# Patient Record
Sex: FEMALE | Race: WHITE | NOT HISPANIC OR LATINO | Employment: OTHER | RURAL
[De-identification: names, ages, dates, MRNs, and addresses within clinical notes are randomized per-mention and may not be internally consistent; named-entity substitution may affect disease eponyms.]

---

## 2020-12-03 ENCOUNTER — HISTORICAL (OUTPATIENT)
Dept: ADMINISTRATIVE | Facility: HOSPITAL | Age: 50
End: 2020-12-03

## 2020-12-03 LAB
CHOLEST SERPL-MCNC: 143 MG/DL (ref 140–200)
CHOLEST/HDLC SERPL: 3 {RATIO}
HDLC SERPL-MCNC: 55 MG/DL
LDLC SERPL CALC-MCNC: 55 MG/DL
TRIGL SERPL-MCNC: 167 MG/DL (ref 35–150)

## 2021-06-02 ENCOUNTER — OFFICE VISIT (OUTPATIENT)
Dept: FAMILY MEDICINE | Facility: CLINIC | Age: 51
End: 2021-06-02
Payer: COMMERCIAL

## 2021-06-02 VITALS — SYSTOLIC BLOOD PRESSURE: 100 MMHG | DIASTOLIC BLOOD PRESSURE: 68 MMHG | TEMPERATURE: 98 F | HEART RATE: 75 BPM

## 2021-06-02 DIAGNOSIS — G47.30 SLEEP APNEA, UNSPECIFIED TYPE: ICD-10-CM

## 2021-06-02 DIAGNOSIS — Z12.31 ENCOUNTER FOR SCREENING MAMMOGRAM FOR BREAST CANCER: ICD-10-CM

## 2021-06-02 DIAGNOSIS — I10 ESSENTIAL HYPERTENSION: Primary | ICD-10-CM

## 2021-06-02 LAB
ALBUMIN SERPL BCP-MCNC: 3.7 G/DL (ref 3.5–5)
ALBUMIN/GLOB SERPL: 1.1 {RATIO}
ALP SERPL-CCNC: 94 U/L (ref 41–108)
ALT SERPL W P-5'-P-CCNC: 25 U/L (ref 13–56)
ANION GAP SERPL CALCULATED.3IONS-SCNC: 12 MMOL/L (ref 7–16)
AST SERPL W P-5'-P-CCNC: 17 U/L (ref 15–37)
BASOPHILS # BLD AUTO: 0.06 K/UL (ref 0–0.2)
BASOPHILS NFR BLD AUTO: 0.8 % (ref 0–1)
BILIRUB SERPL-MCNC: 0.3 MG/DL (ref 0–1.2)
BUN SERPL-MCNC: 17 MG/DL (ref 7–18)
BUN/CREAT SERPL: 9 (ref 6–20)
CALCIUM SERPL-MCNC: 9.2 MG/DL (ref 8.5–10.1)
CHLORIDE SERPL-SCNC: 109 MMOL/L (ref 98–107)
CHOLEST SERPL-MCNC: 184 MG/DL (ref 0–200)
CHOLEST/HDLC SERPL: 3.1 {RATIO}
CO2 SERPL-SCNC: 25 MMOL/L (ref 21–32)
CREAT SERPL-MCNC: 1.97 MG/DL (ref 0.55–1.02)
DIFFERENTIAL METHOD BLD: ABNORMAL
EOSINOPHIL # BLD AUTO: 0.13 K/UL (ref 0–0.5)
EOSINOPHIL NFR BLD AUTO: 1.7 % (ref 1–4)
ERYTHROCYTE [DISTWIDTH] IN BLOOD BY AUTOMATED COUNT: 14.2 % (ref 11.5–14.5)
GLOBULIN SER-MCNC: 3.5 G/DL (ref 2–4)
GLUCOSE SERPL-MCNC: 100 MG/DL (ref 74–106)
HCT VFR BLD AUTO: 44 % (ref 38–47)
HDLC SERPL-MCNC: 60 MG/DL (ref 40–60)
HGB BLD-MCNC: 14.2 G/DL (ref 12–16)
IMM GRANULOCYTES # BLD AUTO: 0.02 K/UL (ref 0–0.04)
IMM GRANULOCYTES NFR BLD: 0.3 % (ref 0–0.4)
LDLC SERPL CALC-MCNC: 105 MG/DL
LDLC/HDLC SERPL: 1.8 {RATIO}
LYMPHOCYTES # BLD AUTO: 2.3 K/UL (ref 1–4.8)
LYMPHOCYTES NFR BLD AUTO: 30.5 % (ref 27–41)
MCH RBC QN AUTO: 31.1 PG (ref 27–31)
MCHC RBC AUTO-ENTMCNC: 32.3 G/DL (ref 32–36)
MCV RBC AUTO: 96.5 FL (ref 80–96)
MONOCYTES # BLD AUTO: 0.42 K/UL (ref 0–0.8)
MONOCYTES NFR BLD AUTO: 5.6 % (ref 2–6)
MPC BLD CALC-MCNC: 11.2 FL (ref 9.4–12.4)
NEUTROPHILS # BLD AUTO: 4.61 K/UL (ref 1.8–7.7)
NEUTROPHILS NFR BLD AUTO: 61.1 % (ref 53–65)
NONHDLC SERPL-MCNC: 124 MG/DL
NRBC # BLD AUTO: 0 X10E3/UL
NRBC, AUTO (.00): 0 %
PLATELET # BLD AUTO: 338 K/UL (ref 150–400)
POTASSIUM SERPL-SCNC: 5.1 MMOL/L (ref 3.5–5.1)
PROT SERPL-MCNC: 7.2 G/DL (ref 6.4–8.2)
RBC # BLD AUTO: 4.56 M/UL (ref 4.2–5.4)
SODIUM SERPL-SCNC: 141 MMOL/L (ref 136–145)
TRIGL SERPL-MCNC: 97 MG/DL (ref 35–150)
VLDLC SERPL-MCNC: 19 MG/DL
WBC # BLD AUTO: 7.54 K/UL (ref 4.5–11)

## 2021-06-02 PROCEDURE — 99214 OFFICE O/P EST MOD 30 MIN: CPT | Mod: ,,, | Performed by: NURSE PRACTITIONER

## 2021-06-02 PROCEDURE — 1126F PR PAIN SEVERITY QUANTIFIED, NO PAIN PRESENT: ICD-10-PCS | Mod: ,,, | Performed by: NURSE PRACTITIONER

## 2021-06-02 PROCEDURE — 1126F AMNT PAIN NOTED NONE PRSNT: CPT | Mod: ,,, | Performed by: NURSE PRACTITIONER

## 2021-06-02 PROCEDURE — 85025 COMPLETE CBC W/AUTO DIFF WBC: CPT | Mod: ,,, | Performed by: CLINICAL MEDICAL LABORATORY

## 2021-06-02 PROCEDURE — 99214 PR OFFICE/OUTPT VISIT, EST, LEVL IV, 30-39 MIN: ICD-10-PCS | Mod: ,,, | Performed by: NURSE PRACTITIONER

## 2021-06-02 PROCEDURE — 80053 COMPREHEN METABOLIC PANEL: CPT | Mod: ,,, | Performed by: CLINICAL MEDICAL LABORATORY

## 2021-06-02 PROCEDURE — 80061 LIPID PANEL: CPT | Mod: ,,, | Performed by: CLINICAL MEDICAL LABORATORY

## 2021-06-02 PROCEDURE — 85025 CBC WITH DIFFERENTIAL: ICD-10-PCS | Mod: ,,, | Performed by: CLINICAL MEDICAL LABORATORY

## 2021-06-02 PROCEDURE — 80061 LIPID PANEL: ICD-10-PCS | Mod: ,,, | Performed by: CLINICAL MEDICAL LABORATORY

## 2021-06-02 PROCEDURE — 80053 COMPREHENSIVE METABOLIC PANEL: ICD-10-PCS | Mod: ,,, | Performed by: CLINICAL MEDICAL LABORATORY

## 2021-06-02 RX ORDER — CETIRIZINE HYDROCHLORIDE 10 MG/1
10 TABLET ORAL DAILY
COMMUNITY
End: 2021-09-08 | Stop reason: SDUPTHER

## 2021-06-02 RX ORDER — VIT C/E/ZN/COPPR/LUTEIN/ZEAXAN 250MG-90MG
1000 CAPSULE ORAL DAILY
COMMUNITY
End: 2021-11-04

## 2021-06-02 RX ORDER — ASPIRIN 81 MG/1
81 TABLET ORAL DAILY
COMMUNITY

## 2021-06-02 RX ORDER — BUDESONIDE AND FORMOTEROL FUMARATE DIHYDRATE 160; 4.5 UG/1; UG/1
2 AEROSOL RESPIRATORY (INHALATION) EVERY 12 HOURS
COMMUNITY
End: 2021-09-08 | Stop reason: SDUPTHER

## 2021-06-02 RX ORDER — AMLODIPINE BESYLATE 10 MG/1
10 TABLET ORAL DAILY
COMMUNITY
End: 2021-09-08 | Stop reason: SDUPTHER

## 2021-06-02 RX ORDER — EZETIMIBE 10 MG/1
10 TABLET ORAL DAILY
COMMUNITY
End: 2021-09-08 | Stop reason: SDUPTHER

## 2021-06-02 RX ORDER — EVOLOCUMAB 140 MG/ML
140 INJECTION, SOLUTION SUBCUTANEOUS
COMMUNITY
End: 2021-12-08 | Stop reason: ALTCHOICE

## 2021-06-02 RX ORDER — CYCLOBENZAPRINE HCL 10 MG
10 TABLET ORAL 3 TIMES DAILY PRN
COMMUNITY
End: 2021-09-08 | Stop reason: SDUPTHER

## 2021-06-02 RX ORDER — LOSARTAN POTASSIUM 50 MG/1
50 TABLET ORAL DAILY
COMMUNITY
End: 2021-09-08 | Stop reason: SDUPTHER

## 2021-06-02 RX ORDER — LABETALOL 200 MG/1
200 TABLET, FILM COATED ORAL 2 TIMES DAILY
COMMUNITY
End: 2021-07-08 | Stop reason: SDUPTHER

## 2021-06-02 RX ORDER — GABAPENTIN 600 MG/1
600 TABLET ORAL 3 TIMES DAILY
COMMUNITY
End: 2021-09-08 | Stop reason: SDUPTHER

## 2021-06-02 RX ORDER — CLOPIDOGREL BISULFATE 75 MG/1
75 TABLET ORAL DAILY
COMMUNITY
End: 2021-09-08 | Stop reason: SDUPTHER

## 2021-06-02 RX ORDER — PANTOPRAZOLE SODIUM 40 MG/1
40 TABLET, DELAYED RELEASE ORAL 2 TIMES DAILY
COMMUNITY
End: 2021-09-08 | Stop reason: SDUPTHER

## 2021-06-17 PROBLEM — G47.33 OSA ON CPAP: Status: ACTIVE | Noted: 2021-06-17

## 2021-07-08 DIAGNOSIS — I10 ESSENTIAL HYPERTENSION: Primary | ICD-10-CM

## 2021-07-08 RX ORDER — LABETALOL 200 MG/1
200 TABLET, FILM COATED ORAL 2 TIMES DAILY
Qty: 180 TABLET | Refills: 1 | Status: SHIPPED | OUTPATIENT
Start: 2021-07-08 | End: 2021-09-08 | Stop reason: SDUPTHER

## 2021-07-23 ENCOUNTER — HOSPITAL ENCOUNTER (OUTPATIENT)
Dept: RADIOLOGY | Facility: HOSPITAL | Age: 51
Discharge: HOME | End: 2021-07-23
Attending: NURSE PRACTITIONER
Payer: COMMERCIAL

## 2021-07-23 VITALS — WEIGHT: 223 LBS | BODY MASS INDEX: 35.84 KG/M2 | HEIGHT: 66 IN

## 2021-07-23 DIAGNOSIS — Z12.31 ENCOUNTER FOR SCREENING MAMMOGRAM FOR BREAST CANCER: ICD-10-CM

## 2021-07-23 PROCEDURE — 77067 SCR MAMMO BI INCL CAD: CPT | Mod: 26,,, | Performed by: RADIOLOGY

## 2021-07-23 PROCEDURE — 77067 SCR MAMMO BI INCL CAD: CPT | Mod: TC

## 2021-07-23 PROCEDURE — 77067 MAMMO DIGITAL SCREENING BILAT: ICD-10-PCS | Mod: 26,,, | Performed by: RADIOLOGY

## 2021-09-08 ENCOUNTER — OFFICE VISIT (OUTPATIENT)
Dept: FAMILY MEDICINE | Facility: CLINIC | Age: 51
End: 2021-09-08
Payer: COMMERCIAL

## 2021-09-08 VITALS
RESPIRATION RATE: 18 BRPM | OXYGEN SATURATION: 99 % | HEART RATE: 86 BPM | DIASTOLIC BLOOD PRESSURE: 68 MMHG | TEMPERATURE: 98 F | SYSTOLIC BLOOD PRESSURE: 104 MMHG

## 2021-09-08 DIAGNOSIS — I10 ESSENTIAL HYPERTENSION: ICD-10-CM

## 2021-09-08 DIAGNOSIS — J44.9 CHRONIC OBSTRUCTIVE PULMONARY DISEASE, UNSPECIFIED COPD TYPE: Primary | ICD-10-CM

## 2021-09-08 PROCEDURE — 1159F PR MEDICATION LIST DOCUMENTED IN MEDICAL RECORD: ICD-10-PCS | Mod: ,,, | Performed by: NURSE PRACTITIONER

## 2021-09-08 PROCEDURE — 3078F DIAST BP <80 MM HG: CPT | Mod: ,,, | Performed by: NURSE PRACTITIONER

## 2021-09-08 PROCEDURE — 4010F ACE/ARB THERAPY RXD/TAKEN: CPT | Mod: ,,, | Performed by: NURSE PRACTITIONER

## 2021-09-08 PROCEDURE — 99214 OFFICE O/P EST MOD 30 MIN: CPT | Mod: ,,, | Performed by: NURSE PRACTITIONER

## 2021-09-08 PROCEDURE — 3078F PR MOST RECENT DIASTOLIC BLOOD PRESSURE < 80 MM HG: ICD-10-PCS | Mod: ,,, | Performed by: NURSE PRACTITIONER

## 2021-09-08 PROCEDURE — 3074F SYST BP LT 130 MM HG: CPT | Mod: ,,, | Performed by: NURSE PRACTITIONER

## 2021-09-08 PROCEDURE — 1159F MED LIST DOCD IN RCRD: CPT | Mod: ,,, | Performed by: NURSE PRACTITIONER

## 2021-09-08 PROCEDURE — 4010F PR ACE/ARB THEARPY RXD/TAKEN: ICD-10-PCS | Mod: ,,, | Performed by: NURSE PRACTITIONER

## 2021-09-08 PROCEDURE — 3074F PR MOST RECENT SYSTOLIC BLOOD PRESSURE < 130 MM HG: ICD-10-PCS | Mod: ,,, | Performed by: NURSE PRACTITIONER

## 2021-09-08 PROCEDURE — 99214 PR OFFICE/OUTPT VISIT, EST, LEVL IV, 30-39 MIN: ICD-10-PCS | Mod: ,,, | Performed by: NURSE PRACTITIONER

## 2021-09-08 RX ORDER — ALBUTEROL SULFATE 90 UG/1
2 AEROSOL, METERED RESPIRATORY (INHALATION) 2 TIMES DAILY PRN
Qty: 18 G | Refills: 0 | Status: SHIPPED | OUTPATIENT
Start: 2021-09-08 | End: 2022-04-28 | Stop reason: SDUPTHER

## 2021-09-08 RX ORDER — CYCLOBENZAPRINE HCL 10 MG
10 TABLET ORAL 3 TIMES DAILY PRN
Qty: 60 TABLET | Refills: 0 | Status: SHIPPED | OUTPATIENT
Start: 2021-09-08 | End: 2022-08-04

## 2021-09-08 RX ORDER — AMLODIPINE BESYLATE 10 MG/1
10 TABLET ORAL DAILY
Qty: 30 TABLET | Refills: 3 | Status: SHIPPED | OUTPATIENT
Start: 2021-09-08 | End: 2023-12-12 | Stop reason: SDUPTHER

## 2021-09-08 RX ORDER — BUDESONIDE AND FORMOTEROL FUMARATE DIHYDRATE 160; 4.5 UG/1; UG/1
2 AEROSOL RESPIRATORY (INHALATION) EVERY 12 HOURS
Qty: 10.2 G | Refills: 3 | Status: SHIPPED | OUTPATIENT
Start: 2021-09-08 | End: 2022-11-28

## 2021-09-08 RX ORDER — LOSARTAN POTASSIUM 50 MG/1
50 TABLET ORAL DAILY
Qty: 30 TABLET | Refills: 3 | Status: SHIPPED | OUTPATIENT
Start: 2021-09-08 | End: 2023-12-12 | Stop reason: SDUPTHER

## 2021-09-08 RX ORDER — CLOPIDOGREL BISULFATE 75 MG/1
75 TABLET ORAL DAILY
Qty: 30 TABLET | Refills: 3 | Status: SHIPPED | OUTPATIENT
Start: 2021-09-08 | End: 2023-12-12 | Stop reason: SDUPTHER

## 2021-09-08 RX ORDER — PANTOPRAZOLE SODIUM 40 MG/1
40 TABLET, DELAYED RELEASE ORAL 2 TIMES DAILY
Qty: 60 TABLET | Refills: 3 | Status: SHIPPED | OUTPATIENT
Start: 2021-09-08 | End: 2022-01-21

## 2021-09-08 RX ORDER — LABETALOL 200 MG/1
200 TABLET, FILM COATED ORAL 2 TIMES DAILY
Qty: 60 TABLET | Refills: 3 | Status: SHIPPED | OUTPATIENT
Start: 2021-09-08 | End: 2023-12-12 | Stop reason: SDUPTHER

## 2021-09-08 RX ORDER — EZETIMIBE 10 MG/1
10 TABLET ORAL DAILY
Qty: 30 TABLET | Refills: 3 | Status: SHIPPED | OUTPATIENT
Start: 2021-09-08 | End: 2022-05-05

## 2021-09-08 RX ORDER — GABAPENTIN 600 MG/1
600 TABLET ORAL 3 TIMES DAILY
Qty: 90 TABLET | Refills: 3 | Status: SHIPPED | OUTPATIENT
Start: 2021-09-08 | End: 2022-06-13

## 2021-09-08 RX ORDER — CETIRIZINE HYDROCHLORIDE 10 MG/1
10 TABLET ORAL DAILY
Qty: 30 TABLET | Refills: 3 | Status: SHIPPED | OUTPATIENT
Start: 2021-09-08 | End: 2022-11-28 | Stop reason: SDUPTHER

## 2021-10-11 DIAGNOSIS — Z11.59 SCREENING FOR VIRAL DISEASE: Primary | ICD-10-CM

## 2021-10-13 ENCOUNTER — ANESTHESIA (OUTPATIENT)
Dept: PAIN MEDICINE | Facility: HOSPITAL | Age: 51
End: 2021-10-13
Payer: COMMERCIAL

## 2021-10-13 ENCOUNTER — ANESTHESIA EVENT (OUTPATIENT)
Dept: PAIN MEDICINE | Facility: HOSPITAL | Age: 51
End: 2021-10-13
Payer: COMMERCIAL

## 2021-10-13 ENCOUNTER — HOSPITAL ENCOUNTER (OUTPATIENT)
Facility: HOSPITAL | Age: 51
Discharge: HOME | End: 2021-10-13
Attending: ANESTHESIOLOGY | Admitting: ANESTHESIOLOGY
Payer: COMMERCIAL

## 2021-10-13 VITALS
DIASTOLIC BLOOD PRESSURE: 111 MMHG | WEIGHT: 242 LBS | SYSTOLIC BLOOD PRESSURE: 159 MMHG | RESPIRATION RATE: 17 BRPM | HEART RATE: 68 BPM | TEMPERATURE: 97 F | HEIGHT: 65 IN | BODY MASS INDEX: 40.32 KG/M2 | OXYGEN SATURATION: 100 %

## 2021-10-13 DIAGNOSIS — M47.817 LUMBOSACRAL SPONDYLOSIS WITHOUT MYELOPATHY: ICD-10-CM

## 2021-10-13 PROCEDURE — 64625 RF ABLTJ NRV NRVTG SI JT: CPT | Performed by: ANESTHESIOLOGY

## 2021-10-13 PROCEDURE — 63600175 PHARM REV CODE 636 W HCPCS: Performed by: ANESTHESIOLOGY

## 2021-10-13 PROCEDURE — 37000008 HC ANESTHESIA 1ST 15 MINUTES: Performed by: ANESTHESIOLOGY

## 2021-10-13 PROCEDURE — 25000003 PHARM REV CODE 250: Performed by: ANESTHESIOLOGY

## 2021-10-13 PROCEDURE — 25000003 PHARM REV CODE 250: Performed by: NURSE ANESTHETIST, CERTIFIED REGISTERED

## 2021-10-13 PROCEDURE — 37000009 HC ANESTHESIA EA ADD 15 MINS: Performed by: ANESTHESIOLOGY

## 2021-10-13 PROCEDURE — 63600175 PHARM REV CODE 636 W HCPCS: Performed by: NURSE ANESTHETIST, CERTIFIED REGISTERED

## 2021-10-13 PROCEDURE — D9220A PRA ANESTHESIA: Mod: ,,, | Performed by: NURSE ANESTHETIST, CERTIFIED REGISTERED

## 2021-10-13 PROCEDURE — D9220A PRA ANESTHESIA: ICD-10-PCS | Mod: ,,, | Performed by: NURSE ANESTHETIST, CERTIFIED REGISTERED

## 2021-10-13 PROCEDURE — 27201423 OPTIME MED/SURG SUP & DEVICES STERILE SUPPLY: Performed by: ANESTHESIOLOGY

## 2021-10-13 RX ORDER — LIDOCAINE HYDROCHLORIDE 20 MG/ML
INJECTION, SOLUTION EPIDURAL; INFILTRATION; INTRACAUDAL; PERINEURAL
Status: DISCONTINUED | OUTPATIENT
Start: 2021-10-13 | End: 2021-10-13

## 2021-10-13 RX ORDER — FENTANYL CITRATE 50 UG/ML
INJECTION, SOLUTION INTRAMUSCULAR; INTRAVENOUS
Status: DISCONTINUED | OUTPATIENT
Start: 2021-10-13 | End: 2021-10-13

## 2021-10-13 RX ORDER — BUPIVACAINE HYDROCHLORIDE 2.5 MG/ML
INJECTION, SOLUTION INFILTRATION; PERINEURAL
Status: DISCONTINUED | OUTPATIENT
Start: 2021-10-13 | End: 2021-10-13 | Stop reason: HOSPADM

## 2021-10-13 RX ORDER — PROPOFOL 10 MG/ML
VIAL (ML) INTRAVENOUS
Status: DISCONTINUED | OUTPATIENT
Start: 2021-10-13 | End: 2021-10-13

## 2021-10-13 RX ORDER — TRIAMCINOLONE ACETONIDE 40 MG/ML
INJECTION, SUSPENSION INTRA-ARTICULAR; INTRAMUSCULAR
Status: DISCONTINUED | OUTPATIENT
Start: 2021-10-13 | End: 2021-10-13 | Stop reason: HOSPADM

## 2021-10-13 RX ORDER — SODIUM CHLORIDE 9 MG/ML
INJECTION, SOLUTION INTRAVENOUS CONTINUOUS
Status: DISCONTINUED | OUTPATIENT
Start: 2021-10-13 | End: 2021-10-13 | Stop reason: HOSPADM

## 2021-10-13 RX ORDER — ORPHENADRINE CITRATE 30 MG/ML
INJECTION INTRAMUSCULAR; INTRAVENOUS
Status: DISCONTINUED | OUTPATIENT
Start: 2021-10-13 | End: 2021-10-13

## 2021-10-13 RX ADMIN — LIDOCAINE HYDROCHLORIDE 80 MG: 20 INJECTION, SOLUTION INTRAVENOUS at 12:10

## 2021-10-13 RX ADMIN — ORPHENADRINE CITRATE 60 MG: 30 INJECTION INTRAMUSCULAR; INTRAVENOUS at 12:10

## 2021-10-13 RX ADMIN — FENTANYL CITRATE 100 MCG: 50 INJECTION INTRAMUSCULAR; INTRAVENOUS at 12:10

## 2021-10-13 RX ADMIN — PROPOFOL 100 MG: 10 INJECTION, EMULSION INTRAVENOUS at 12:10

## 2021-10-13 RX ADMIN — SODIUM CHLORIDE: 9 INJECTION, SOLUTION INTRAVENOUS at 12:10

## 2021-11-04 ENCOUNTER — OFFICE VISIT (OUTPATIENT)
Dept: FAMILY MEDICINE | Facility: CLINIC | Age: 51
End: 2021-11-04
Payer: COMMERCIAL

## 2021-11-04 VITALS
BODY MASS INDEX: 39.61 KG/M2 | SYSTOLIC BLOOD PRESSURE: 108 MMHG | TEMPERATURE: 98 F | RESPIRATION RATE: 20 BRPM | HEART RATE: 98 BPM | DIASTOLIC BLOOD PRESSURE: 74 MMHG | WEIGHT: 238 LBS | OXYGEN SATURATION: 98 %

## 2021-11-04 DIAGNOSIS — M25.562 ACUTE PAIN OF LEFT KNEE: Primary | ICD-10-CM

## 2021-11-04 DIAGNOSIS — M25.569 KNEE PAIN, UNSPECIFIED CHRONICITY, UNSPECIFIED LATERALITY: ICD-10-CM

## 2021-11-04 PROCEDURE — 4010F ACE/ARB THERAPY RXD/TAKEN: CPT | Mod: ,,, | Performed by: NURSE PRACTITIONER

## 2021-11-04 PROCEDURE — 96372 PR INJECTION,THERAP/PROPH/DIAG2ST, IM OR SUBCUT: ICD-10-PCS | Mod: ,,, | Performed by: NURSE PRACTITIONER

## 2021-11-04 PROCEDURE — 99214 PR OFFICE/OUTPT VISIT, EST, LEVL IV, 30-39 MIN: ICD-10-PCS | Mod: 25,,, | Performed by: NURSE PRACTITIONER

## 2021-11-04 PROCEDURE — 96372 THER/PROPH/DIAG INJ SC/IM: CPT | Mod: ,,, | Performed by: NURSE PRACTITIONER

## 2021-11-04 PROCEDURE — 1159F MED LIST DOCD IN RCRD: CPT | Mod: ,,, | Performed by: NURSE PRACTITIONER

## 2021-11-04 PROCEDURE — 99214 OFFICE O/P EST MOD 30 MIN: CPT | Mod: 25,,, | Performed by: NURSE PRACTITIONER

## 2021-11-04 PROCEDURE — 3074F SYST BP LT 130 MM HG: CPT | Mod: ,,, | Performed by: NURSE PRACTITIONER

## 2021-11-04 PROCEDURE — 3078F PR MOST RECENT DIASTOLIC BLOOD PRESSURE < 80 MM HG: ICD-10-PCS | Mod: ,,, | Performed by: NURSE PRACTITIONER

## 2021-11-04 PROCEDURE — 3078F DIAST BP <80 MM HG: CPT | Mod: ,,, | Performed by: NURSE PRACTITIONER

## 2021-11-04 PROCEDURE — 4010F PR ACE/ARB THEARPY RXD/TAKEN: ICD-10-PCS | Mod: ,,, | Performed by: NURSE PRACTITIONER

## 2021-11-04 PROCEDURE — 3008F PR BODY MASS INDEX (BMI) DOCUMENTED: ICD-10-PCS | Mod: ,,, | Performed by: NURSE PRACTITIONER

## 2021-11-04 PROCEDURE — 3074F PR MOST RECENT SYSTOLIC BLOOD PRESSURE < 130 MM HG: ICD-10-PCS | Mod: ,,, | Performed by: NURSE PRACTITIONER

## 2021-11-04 PROCEDURE — 3008F BODY MASS INDEX DOCD: CPT | Mod: ,,, | Performed by: NURSE PRACTITIONER

## 2021-11-04 PROCEDURE — 1159F PR MEDICATION LIST DOCUMENTED IN MEDICAL RECORD: ICD-10-PCS | Mod: ,,, | Performed by: NURSE PRACTITIONER

## 2021-11-04 RX ORDER — HYDROCODONE BITARTRATE AND ACETAMINOPHEN 7.5; 325 MG/1; MG/1
1 TABLET ORAL 2 TIMES DAILY PRN
COMMUNITY
Start: 2021-10-19

## 2021-11-04 RX ORDER — DICLOFENAC SODIUM 20 MG/G
SOLUTION TOPICAL
Qty: 112 G | Refills: 1 | Status: SHIPPED | OUTPATIENT
Start: 2021-11-04 | End: 2021-11-18 | Stop reason: SDUPTHER

## 2021-11-04 RX ORDER — KETOROLAC TROMETHAMINE 30 MG/ML
60 INJECTION, SOLUTION INTRAMUSCULAR; INTRAVENOUS
Status: COMPLETED | OUTPATIENT
Start: 2021-11-04 | End: 2021-11-04

## 2021-11-04 RX ORDER — EVOLOCUMAB 140 MG/ML
140 INJECTION, SOLUTION SUBCUTANEOUS
COMMUNITY
Start: 2021-10-11

## 2021-11-04 RX ADMIN — KETOROLAC TROMETHAMINE 60 MG: 30 INJECTION, SOLUTION INTRAMUSCULAR; INTRAVENOUS at 03:11

## 2021-11-10 ENCOUNTER — HOSPITAL ENCOUNTER (OUTPATIENT)
Dept: RADIOLOGY | Facility: HOSPITAL | Age: 51
Discharge: HOME | End: 2021-11-10
Attending: NURSE PRACTITIONER
Payer: COMMERCIAL

## 2021-11-10 DIAGNOSIS — S83.249A TEAR OF MEDIAL MENISCUS OF KNEE, CURRENT, UNSPECIFIED LATERALITY, UNSPECIFIED TEAR TYPE, INITIAL ENCOUNTER: Primary | ICD-10-CM

## 2021-11-10 DIAGNOSIS — M25.562 ACUTE PAIN OF LEFT KNEE: ICD-10-CM

## 2021-11-10 PROCEDURE — 73721 MRI JNT OF LWR EXTRE W/O DYE: CPT | Mod: TC,LT

## 2021-11-11 ENCOUNTER — CLINICAL SUPPORT (OUTPATIENT)
Dept: REHABILITATION | Facility: HOSPITAL | Age: 51
End: 2021-11-11
Payer: COMMERCIAL

## 2021-11-11 DIAGNOSIS — S83.242S TEAR OF MEDIAL MENISCUS OF LEFT KNEE, CURRENT, UNSPECIFIED TEAR TYPE, SEQUELA: ICD-10-CM

## 2021-11-11 PROCEDURE — 97162 PT EVAL MOD COMPLEX 30 MIN: CPT

## 2021-11-13 PROBLEM — S83.242A TEAR OF MEDIAL MENISCUS OF LEFT KNEE, CURRENT: Status: ACTIVE | Noted: 2021-11-13

## 2021-11-17 ENCOUNTER — CLINICAL SUPPORT (OUTPATIENT)
Dept: REHABILITATION | Facility: HOSPITAL | Age: 51
End: 2021-11-17
Payer: COMMERCIAL

## 2021-11-17 DIAGNOSIS — M25.562 ACUTE PAIN OF LEFT KNEE: Primary | ICD-10-CM

## 2021-11-17 PROCEDURE — 97110 THERAPEUTIC EXERCISES: CPT

## 2021-11-24 ENCOUNTER — CLINICAL SUPPORT (OUTPATIENT)
Dept: REHABILITATION | Facility: HOSPITAL | Age: 51
End: 2021-11-24
Payer: COMMERCIAL

## 2021-11-24 DIAGNOSIS — S83.242S OTHER TEAR OF MEDIAL MENISCUS OF LEFT KNEE AS CURRENT INJURY, SEQUELA: Primary | ICD-10-CM

## 2021-11-24 PROCEDURE — 97110 THERAPEUTIC EXERCISES: CPT

## 2021-12-01 ENCOUNTER — CLINICAL SUPPORT (OUTPATIENT)
Dept: REHABILITATION | Facility: HOSPITAL | Age: 51
End: 2021-12-01
Payer: COMMERCIAL

## 2021-12-01 ENCOUNTER — TELEPHONE (OUTPATIENT)
Dept: FAMILY MEDICINE | Facility: CLINIC | Age: 51
End: 2021-12-01
Payer: COMMERCIAL

## 2021-12-01 DIAGNOSIS — M25.562 ACUTE PAIN OF LEFT KNEE: Primary | ICD-10-CM

## 2021-12-01 PROCEDURE — 97110 THERAPEUTIC EXERCISES: CPT

## 2021-12-08 ENCOUNTER — OFFICE VISIT (OUTPATIENT)
Dept: FAMILY MEDICINE | Facility: CLINIC | Age: 51
End: 2021-12-08
Payer: COMMERCIAL

## 2021-12-08 VITALS
DIASTOLIC BLOOD PRESSURE: 84 MMHG | BODY MASS INDEX: 40.1 KG/M2 | OXYGEN SATURATION: 99 % | SYSTOLIC BLOOD PRESSURE: 128 MMHG | WEIGHT: 241 LBS | RESPIRATION RATE: 20 BRPM | HEART RATE: 80 BPM | TEMPERATURE: 98 F

## 2021-12-08 DIAGNOSIS — Z23 IMMUNIZATION DUE: ICD-10-CM

## 2021-12-08 DIAGNOSIS — I10 ESSENTIAL HYPERTENSION: Primary | ICD-10-CM

## 2021-12-08 DIAGNOSIS — E78.5 HYPERLIPIDEMIA, UNSPECIFIED HYPERLIPIDEMIA TYPE: ICD-10-CM

## 2021-12-08 DIAGNOSIS — N62 LARGE BREASTS: ICD-10-CM

## 2021-12-08 LAB
ALBUMIN SERPL BCP-MCNC: 3.5 G/DL (ref 3.5–5)
ALBUMIN/GLOB SERPL: 1 {RATIO}
ALP SERPL-CCNC: 82 U/L (ref 41–108)
ALT SERPL W P-5'-P-CCNC: 33 U/L (ref 13–56)
ANION GAP SERPL CALCULATED.3IONS-SCNC: 9 MMOL/L (ref 7–16)
AST SERPL W P-5'-P-CCNC: 25 U/L (ref 15–37)
BILIRUB SERPL-MCNC: 0.3 MG/DL (ref 0–1.2)
BUN SERPL-MCNC: 13 MG/DL (ref 7–18)
BUN/CREAT SERPL: 7 (ref 6–20)
CALCIUM SERPL-MCNC: 8.8 MG/DL (ref 8.5–10.1)
CHLORIDE SERPL-SCNC: 109 MMOL/L (ref 98–107)
CHOLEST SERPL-MCNC: 118 MG/DL (ref 0–200)
CHOLEST/HDLC SERPL: 2.5 {RATIO}
CK SERPL-CCNC: 173 U/L (ref 26–192)
CO2 SERPL-SCNC: 27 MMOL/L (ref 21–32)
CREAT SERPL-MCNC: 1.79 MG/DL (ref 0.55–1.02)
GLOBULIN SER-MCNC: 3.6 G/DL (ref 2–4)
GLUCOSE SERPL-MCNC: 88 MG/DL (ref 74–106)
HDLC SERPL-MCNC: 47 MG/DL (ref 40–60)
LDLC SERPL CALC-MCNC: 29 MG/DL
LDLC/HDLC SERPL: 0.6 {RATIO}
NONHDLC SERPL-MCNC: 71 MG/DL
POTASSIUM SERPL-SCNC: 4.3 MMOL/L (ref 3.5–5.1)
PROT SERPL-MCNC: 7.1 G/DL (ref 6.4–8.2)
SODIUM SERPL-SCNC: 141 MMOL/L (ref 136–145)
TRIGL SERPL-MCNC: 210 MG/DL (ref 35–150)
VLDLC SERPL-MCNC: 42 MG/DL

## 2021-12-08 PROCEDURE — 80061 LIPID PANEL: CPT | Mod: ,,, | Performed by: CLINICAL MEDICAL LABORATORY

## 2021-12-08 PROCEDURE — 4010F ACE/ARB THERAPY RXD/TAKEN: CPT | Mod: ,,, | Performed by: NURSE PRACTITIONER

## 2021-12-08 PROCEDURE — 82550 CK: ICD-10-PCS | Mod: ,,, | Performed by: CLINICAL MEDICAL LABORATORY

## 2021-12-08 PROCEDURE — 90686 IIV4 VACC NO PRSV 0.5 ML IM: CPT | Mod: ,,, | Performed by: NURSE PRACTITIONER

## 2021-12-08 PROCEDURE — 80053 COMPREHEN METABOLIC PANEL: CPT | Mod: ,,, | Performed by: CLINICAL MEDICAL LABORATORY

## 2021-12-08 PROCEDURE — 80053 COMPREHENSIVE METABOLIC PANEL: ICD-10-PCS | Mod: ,,, | Performed by: CLINICAL MEDICAL LABORATORY

## 2021-12-08 PROCEDURE — 80061 LIPID PANEL: ICD-10-PCS | Mod: ,,, | Performed by: CLINICAL MEDICAL LABORATORY

## 2021-12-08 PROCEDURE — 99214 OFFICE O/P EST MOD 30 MIN: CPT | Mod: 25,,, | Performed by: NURSE PRACTITIONER

## 2021-12-08 PROCEDURE — 99214 PR OFFICE/OUTPT VISIT, EST, LEVL IV, 30-39 MIN: ICD-10-PCS | Mod: 25,,, | Performed by: NURSE PRACTITIONER

## 2021-12-08 PROCEDURE — 82550 ASSAY OF CK (CPK): CPT | Mod: ,,, | Performed by: CLINICAL MEDICAL LABORATORY

## 2021-12-08 PROCEDURE — 4010F PR ACE/ARB THEARPY RXD/TAKEN: ICD-10-PCS | Mod: ,,, | Performed by: NURSE PRACTITIONER

## 2021-12-08 PROCEDURE — 90471 FLU VACCINE (QUAD) GREATER THAN OR EQUAL TO 3YO PRESERVATIVE FREE IM: ICD-10-PCS | Mod: ,,, | Performed by: NURSE PRACTITIONER

## 2021-12-08 PROCEDURE — 90471 IMMUNIZATION ADMIN: CPT | Mod: ,,, | Performed by: NURSE PRACTITIONER

## 2021-12-08 PROCEDURE — 90686 FLU VACCINE (QUAD) GREATER THAN OR EQUAL TO 3YO PRESERVATIVE FREE IM: ICD-10-PCS | Mod: ,,, | Performed by: NURSE PRACTITIONER

## 2021-12-08 RX ORDER — IBUPROFEN 600 MG/1
600 TABLET ORAL EVERY 8 HOURS PRN
COMMUNITY
End: 2023-12-12

## 2021-12-08 RX ORDER — ASCORBIC ACID 500 MG
500 TABLET ORAL
COMMUNITY

## 2021-12-08 RX ORDER — ZINC GLUCONATE 50 MG
50 TABLET ORAL DAILY
COMMUNITY

## 2021-12-08 RX ORDER — NITROGLYCERIN 0.4 MG/1
0.4 TABLET SUBLINGUAL EVERY 5 MIN PRN
COMMUNITY
Start: 2021-11-19 | End: 2023-10-02 | Stop reason: SDUPTHER

## 2021-12-08 RX ORDER — FAMOTIDINE 20 MG/1
20 TABLET, FILM COATED ORAL DAILY
COMMUNITY
End: 2022-09-01

## 2021-12-15 ENCOUNTER — CLINICAL SUPPORT (OUTPATIENT)
Dept: REHABILITATION | Facility: HOSPITAL | Age: 51
End: 2021-12-15
Payer: COMMERCIAL

## 2021-12-15 DIAGNOSIS — M25.562 ACUTE PAIN OF LEFT KNEE: Primary | ICD-10-CM

## 2021-12-15 PROCEDURE — 97110 THERAPEUTIC EXERCISES: CPT

## 2021-12-20 ENCOUNTER — CLINICAL SUPPORT (OUTPATIENT)
Dept: REHABILITATION | Facility: HOSPITAL | Age: 51
End: 2021-12-20
Payer: COMMERCIAL

## 2021-12-20 ENCOUNTER — OFFICE VISIT (OUTPATIENT)
Dept: SLEEP MEDICINE | Facility: CLINIC | Age: 51
End: 2021-12-20
Attending: FAMILY MEDICINE
Payer: COMMERCIAL

## 2021-12-20 VITALS
OXYGEN SATURATION: 99 % | HEIGHT: 65 IN | WEIGHT: 236.19 LBS | SYSTOLIC BLOOD PRESSURE: 97 MMHG | DIASTOLIC BLOOD PRESSURE: 70 MMHG | BODY MASS INDEX: 39.35 KG/M2 | HEART RATE: 83 BPM

## 2021-12-20 DIAGNOSIS — G47.33 OBSTRUCTIVE SLEEP APNEA: Primary | ICD-10-CM

## 2021-12-20 DIAGNOSIS — G47.19 OTHER HYPERSOMNIA: ICD-10-CM

## 2021-12-20 DIAGNOSIS — M25.562 ACUTE PAIN OF LEFT KNEE: Primary | ICD-10-CM

## 2021-12-20 PROCEDURE — 97110 THERAPEUTIC EXERCISES: CPT

## 2021-12-20 PROCEDURE — 4010F PR ACE/ARB THEARPY RXD/TAKEN: ICD-10-PCS | Mod: ,,, | Performed by: FAMILY MEDICINE

## 2021-12-20 PROCEDURE — 4010F ACE/ARB THERAPY RXD/TAKEN: CPT | Mod: ,,, | Performed by: FAMILY MEDICINE

## 2021-12-20 PROCEDURE — 99203 OFFICE O/P NEW LOW 30 MIN: CPT | Mod: S$PBB,,, | Performed by: FAMILY MEDICINE

## 2021-12-20 PROCEDURE — 97014 ELECTRIC STIMULATION THERAPY: CPT

## 2021-12-20 PROCEDURE — 99215 OFFICE O/P EST HI 40 MIN: CPT | Mod: PBBFAC | Performed by: FAMILY MEDICINE

## 2021-12-20 PROCEDURE — 99203 PR OFFICE/OUTPT VISIT, NEW, LEVL III, 30-44 MIN: ICD-10-PCS | Mod: S$PBB,,, | Performed by: FAMILY MEDICINE

## 2022-01-25 ENCOUNTER — OFFICE VISIT (OUTPATIENT)
Dept: ORTHOPEDICS | Facility: CLINIC | Age: 52
End: 2022-01-25
Payer: COMMERCIAL

## 2022-01-25 VITALS — WEIGHT: 236 LBS | HEIGHT: 65 IN | BODY MASS INDEX: 39.32 KG/M2

## 2022-01-25 DIAGNOSIS — S83.249A TEAR OF MEDIAL MENISCUS OF KNEE, CURRENT, UNSPECIFIED LATERALITY, UNSPECIFIED TEAR TYPE, INITIAL ENCOUNTER: ICD-10-CM

## 2022-01-25 PROCEDURE — 96361 HYDRATE IV INFUSION ADD-ON: CPT

## 2022-01-25 PROCEDURE — 96375 TX/PRO/DX INJ NEW DRUG ADDON: CPT

## 2022-01-25 PROCEDURE — 99203 PR OFFICE/OUTPT VISIT, NEW, LEVL III, 30-44 MIN: ICD-10-PCS | Mod: ,,, | Performed by: ORTHOPAEDIC SURGERY

## 2022-01-25 PROCEDURE — 3008F BODY MASS INDEX DOCD: CPT | Mod: ,,, | Performed by: ORTHOPAEDIC SURGERY

## 2022-01-25 PROCEDURE — 1159F MED LIST DOCD IN RCRD: CPT | Mod: ,,, | Performed by: ORTHOPAEDIC SURGERY

## 2022-01-25 PROCEDURE — 99284 EMERGENCY DEPT VISIT MOD MDM: CPT | Mod: 25

## 2022-01-25 PROCEDURE — 96367 TX/PROPH/DG ADDL SEQ IV INF: CPT

## 2022-01-25 PROCEDURE — 3008F PR BODY MASS INDEX (BMI) DOCUMENTED: ICD-10-PCS | Mod: ,,, | Performed by: ORTHOPAEDIC SURGERY

## 2022-01-25 PROCEDURE — 99203 OFFICE O/P NEW LOW 30 MIN: CPT | Mod: ,,, | Performed by: ORTHOPAEDIC SURGERY

## 2022-01-25 PROCEDURE — 1159F PR MEDICATION LIST DOCUMENTED IN MEDICAL RECORD: ICD-10-PCS | Mod: ,,, | Performed by: ORTHOPAEDIC SURGERY

## 2022-01-25 PROCEDURE — 99284 EMERGENCY DEPT VISIT MOD MDM: CPT | Mod: ,,, | Performed by: PEDIATRICS

## 2022-01-25 PROCEDURE — 96365 THER/PROPH/DIAG IV INF INIT: CPT

## 2022-01-25 PROCEDURE — 99284 PR EMERGENCY DEPT VISIT,LEVEL IV: ICD-10-PCS | Mod: ,,, | Performed by: PEDIATRICS

## 2022-01-25 NOTE — PROGRESS NOTES
ASSESSMENT:      ICD-10-CM ICD-9-CM   1. Tear of medial meniscus of knee, current, unspecified laterality, unspecified tear type, initial encounter  S83.249A 836.0       PLAN:     Findings and treatment options were discussed with the patient regarding the diagnosis.   All questions were answered regarding Kirti Morris 's painful knee.      Natural history and expected course discussed. Questions answered. Educational materials distributed. Rest, ice, compression, and elevation (RICE) therapy. Patellar compression sleeve.  She is doing fairly well at this point.  Does not wish to pursue any surgical intervention.  If anything I would recommend an injection but her pain is not significant at this point.  Will continue to monitor this.  There are no Patient Instructions on file for this visit.    IMAGING:   MRI on pacs- MRI demonstrates the presence of a small medial meniscus tear near the junction of the posterior horn and anterior horns of the medial meniscus.  The remainder of the knee appears to be fairly normal.  X-rays on pacs  No significant osteoarthritic changes.  No evidence of fracture dislocation or pathologic bone.  CC: Knee pain    51 y.o. Female who presents as a new patient to me for evaluation of  left knee pain.  Pt states she has had therapy which did help a lot. States her pain comes and goes, she is pain free  today.  Occupation: N/A  Pain has been present for several months  Injury description none.   Mechanical symptoms, such as clicking, locking, and popping are present.    Swelling and effusions  are present.   The patient does not  describe symptoms of knee instability, such as the knee buckling and the knee giving way.   Symptoms are worsened with activity.  Better with rest. Treatment thus far has included rest, activity modifications, and oral medications.    she  has had formal physical therapy.  she has not had prior injections injections into the knee.   she has had previous  advanced imaging such as MRI.     Here today to discuss diagnosis and treatment options.           REVIEW OF SYSTEMS:   Constitution: Negative. Negative for chills, fever and night sweats.    Hematologic/Lymphatic: Negative for bleeding problem. Does not bruise/bleed easily.   Skin: Negative for dry skin, itching and rash.   Musculoskeletal: Negative for falls. Positive for knee pain and muscle weakness.     All other review of symptoms were reviewed and found to be noncontributory.     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    COPD (chronic obstructive pulmonary disease)     Coronary artery disease     acute MI 02/2014    CRF (chronic renal failure)     GERD (gastroesophageal reflux disease)     Hyperlipidemia     Hypertension     Myocardial infarction     Nonrheumatic mitral (valve) insufficiency 05/21/2018    Sleep apnea 01/06/2020    Stroke     Vitamin D deficiency 07/31/2020       PAST SURGICAL HISTORY:   Past Surgical History:   Procedure Laterality Date    CORONARY ANGIOPLASTY WITH STENT PLACEMENT  02/07/2014    HYSTERECTOMY      RADIOFREQUENCY ABLATION Right 10/13/2021    Procedure: RADIOFREQUENCY ABLATION;  Surgeon: David Combs MD;  Location: [facility];  Service: Pain Management;  Laterality: Right;  Right SI RFTC       FAMILY HISTORY:   Family History   Problem Relation Age of Onset    Ovarian cancer Maternal Aunt     Arthritis Mother        SOCIAL HISTORY:   Social History     Socioeconomic History    Marital status:    Tobacco Use    Smoking status: Current Every Day Smoker     Types: Cigarettes    Smokeless tobacco: Never Used   Substance and Sexual Activity    Alcohol use: Yes    Drug use: Never       MEDICATIONS:     Current Outpatient Medications:     albuterol (VENTOLIN  HFA) 90 mcg/actuation inhaler, Inhale 2 puffs into the lungs 2 (two) times daily as needed (SOB). 2 puffs BID AS NEEDED. Not to exceed 4 times per day., Disp: 18 g, Rfl: 0    amLODIPine (NORVASC) 10 MG tablet, Take 1 tablet (10 mg total) by mouth once daily., Disp: 30 tablet, Rfl: 3    ascorbic acid, vitamin C, (VITAMIN C) 500 MG tablet, 500 mg., Disp: , Rfl:     aspirin (ECOTRIN) 81 MG EC tablet, Take 81 mg by mouth once daily., Disp: , Rfl:     budesonide-formoterol 160-4.5 mcg (SYMBICORT) 160-4.5 mcg/actuation HFAA, Inhale 2 puffs into the lungs every 12 (twelve) hours. Controller - USES PRN., Disp: 10.2 g, Rfl: 3    cetirizine (ZYRTEC) 10 MG tablet, Take 1 tablet (10 mg total) by mouth once daily., Disp: 30 tablet, Rfl: 3    cholecalciferol, vitamin D3, (VITAMIN D3) 250 mcg (10,000 unit) Cap, TAKE ONE CAPSULE BY MOUTH ONE TIME DAILY, Disp: 90 capsule, Rfl: 0    clopidogreL (PLAVIX) 75 mg tablet, Take 1 tablet (75 mg total) by mouth once daily., Disp: 30 tablet, Rfl: 3    cyclobenzaprine (FLEXERIL) 10 MG tablet, Take 1 tablet (10 mg total) by mouth 3 (three) times daily as needed for Muscle spasms. ONLY TAKES PRN, Disp: 60 tablet, Rfl: 0    diclofenac sodium (PENNSAID) 20 mg/gram /actuation(2 %) sopm, Apply 2 pumps to knees bid, Disp: 112 g, Rfl: 1    ezetimibe (ZETIA) 10 mg tablet, Take 1 tablet (10 mg total) by mouth once daily., Disp: 30 tablet, Rfl: 3    famotidine (PEPCID) 20 MG tablet, Take 20 mg by mouth Daily., Disp: , Rfl:     gabapentin (NEURONTIN) 600 MG tablet, Take 1 tablet (600 mg total) by mouth 3 (three) times daily., Disp: 90 tablet, Rfl: 3    HYDROcodone-acetaminophen (NORCO) 7.5-325 mg per tablet, Take 1 tablet by mouth 2 (two) times daily as needed., Disp: , Rfl:     ibuprofen (ADVIL,MOTRIN) 600 MG tablet, Take 600 mg by mouth every 8 (eight) hours as needed for Pain., Disp: , Rfl:     labetaloL (NORMODYNE) 200 MG tablet, Take 1 tablet (200 mg total) by mouth 2 (two) times  "daily., Disp: 60 tablet, Rfl: 3    losartan (COZAAR) 50 MG tablet, Take 1 tablet (50 mg total) by mouth once daily., Disp: 30 tablet, Rfl: 3    multivitamin/iron/folic acid (CENTRUM COMPLETE ORAL), Take by mouth Daily., Disp: , Rfl:     mv-mn/FA/bl coh/isoflav/jujube (ESTROVEN MENOPAUSE ORAL), Take by mouth once daily., Disp: , Rfl:     nitroGLYCERIN (NITROSTAT) 0.4 MG SL tablet, DISSOLVE 1 TABLET UNDER THE TONGUE EVERY 5 MINUTES AS NEEDED FOR CHEST PAIN. DO NOT EXCEED A TOTAL OF 3 DOSES IN 15 MINUTES., Disp: , Rfl:     pantoprazole (PROTONIX) 40 MG tablet, TAKE ONE TABLET BY MOUTH TWICE DAILY, Disp: 60 tablet, Rfl: 3    REPATHA SURECLICK 140 mg/mL PnIj, inject ONE pen into SKIN ONCE every 14 DAYS, Disp: , Rfl:     zinc gluconate 50 mg tablet, Take 50 mg by mouth once daily. Takes as needed, Disp: , Rfl:     ALLERGIES:   Review of patient's allergies indicates:   Allergen Reactions    Atorvastatin      LIPITOR    Levsin [hyoscyamine sulfate]     Nuts [tree nut]      BRAZIL NUTS    Pravastatin     Sulfa (sulfonamide antibiotics)         PHYSICAL EXAMINATION:  Ht 5' 5" (1.651 m)   Wt 107 kg (236 lb)   BMI 39.27 kg/m²   General    Constitutional: She is oriented to person, place, and time. She appears well-developed and well-nourished.   HENT:   Head: Normocephalic and atraumatic.   Nose: Nose normal.   Eyes: EOM are normal. Pupils are equal, round, and reactive to light.   Cardiovascular: Normal rate and intact distal pulses.    Pulmonary/Chest: Effort normal. No respiratory distress. She exhibits no tenderness.   Abdominal: Soft. She exhibits no distension. There is no abdominal tenderness.   Neurological: She is alert and oriented to person, place, and time. She has normal reflexes.   Psychiatric: She has a normal mood and affect. Her behavior is normal. Judgment and thought content normal.             Left Knee Exam     Inspection   Swelling: present  Effusion: present  Deformity: absent    Tenderness "   The patient tender to palpation of the medial joint line.    Crepitus   The patient has crepitus of the medial joint line.    Range of Motion   Extension: abnormal   Flexion: abnormal     Tests   Meniscus   Cesario:  Medial - positive     Other   Meniscal Cyst: present  Popliteal (Baker's) Cyst: absent    Comments:  Pain with Thessaly Maneuver.         No orders of the defined types were placed in this encounter.      Procedures

## 2022-01-26 ENCOUNTER — HOSPITAL ENCOUNTER (EMERGENCY)
Facility: HOSPITAL | Age: 52
Discharge: HOME | End: 2022-01-26
Attending: PEDIATRICS
Payer: COMMERCIAL

## 2022-01-26 ENCOUNTER — TELEPHONE (OUTPATIENT)
Dept: EMERGENCY MEDICINE | Facility: HOSPITAL | Age: 52
End: 2022-01-26
Payer: COMMERCIAL

## 2022-01-26 VITALS
WEIGHT: 230 LBS | SYSTOLIC BLOOD PRESSURE: 122 MMHG | RESPIRATION RATE: 17 BRPM | HEART RATE: 81 BPM | HEIGHT: 65 IN | BODY MASS INDEX: 38.32 KG/M2 | DIASTOLIC BLOOD PRESSURE: 80 MMHG | TEMPERATURE: 100 F | OXYGEN SATURATION: 99 %

## 2022-01-26 DIAGNOSIS — K57.92 DIVERTICULITIS: Primary | ICD-10-CM

## 2022-01-26 LAB
ALBUMIN SERPL BCP-MCNC: 3.4 G/DL (ref 3.5–5)
ALBUMIN/GLOB SERPL: 1 {RATIO}
ALP SERPL-CCNC: 87 U/L (ref 41–108)
ALT SERPL W P-5'-P-CCNC: 21 U/L (ref 13–56)
AMYLASE SERPL-CCNC: 56 U/L (ref 25–115)
ANION GAP SERPL CALCULATED.3IONS-SCNC: 17 MMOL/L (ref 7–16)
AST SERPL W P-5'-P-CCNC: 17 U/L (ref 15–37)
BASOPHILS # BLD AUTO: 0.04 K/UL (ref 0–0.2)
BASOPHILS NFR BLD AUTO: 0.3 % (ref 0–1)
BILIRUB SERPL-MCNC: 0.4 MG/DL (ref 0–1.2)
BUN SERPL-MCNC: 11 MG/DL (ref 7–18)
BUN/CREAT SERPL: 6 (ref 6–20)
CALCIUM SERPL-MCNC: 8.5 MG/DL (ref 8.5–10.1)
CHLORIDE SERPL-SCNC: 101 MMOL/L (ref 98–107)
CO2 SERPL-SCNC: 24 MMOL/L (ref 21–32)
CREAT SERPL-MCNC: 1.84 MG/DL (ref 0.55–1.02)
CRP SERPL-MCNC: 6.49 MG/DL (ref 0–0.8)
DIFFERENTIAL METHOD BLD: ABNORMAL
EOSINOPHIL # BLD AUTO: 0.2 K/UL (ref 0–0.5)
EOSINOPHIL NFR BLD AUTO: 1.6 % (ref 1–4)
ERYTHROCYTE [DISTWIDTH] IN BLOOD BY AUTOMATED COUNT: 13.2 % (ref 11.5–14.5)
ERYTHROCYTE [SEDIMENTATION RATE] IN BLOOD BY WESTERGREN METHOD: 21 MM/HR (ref 0–30)
FLUAV AG UPPER RESP QL IA.RAPID: NEGATIVE
FLUBV AG UPPER RESP QL IA.RAPID: NEGATIVE
GLOBULIN SER-MCNC: 3.5 G/DL (ref 2–4)
GLUCOSE SERPL-MCNC: 117 MG/DL (ref 74–106)
HCT VFR BLD AUTO: 37.7 % (ref 38–47)
HGB BLD-MCNC: 12.8 G/DL (ref 12–16)
LIPASE SERPL-CCNC: 152 U/L (ref 73–393)
LYMPHOCYTES # BLD AUTO: 2.83 K/UL (ref 1–4.8)
LYMPHOCYTES NFR BLD AUTO: 22.5 % (ref 27–41)
MCH RBC QN AUTO: 31.7 PG (ref 27–31)
MCHC RBC AUTO-ENTMCNC: 34 G/DL (ref 32–36)
MCV RBC AUTO: 93.3 FL (ref 80–96)
MONOCYTES # BLD AUTO: 0.84 K/UL (ref 0–0.8)
MONOCYTES NFR BLD AUTO: 6.7 % (ref 2–6)
MPC BLD CALC-MCNC: 10.9 FL (ref 9.4–12.4)
NEUTROPHILS # BLD AUTO: 8.66 K/UL (ref 1.8–7.7)
NEUTROPHILS NFR BLD AUTO: 68.9 % (ref 53–65)
PLATELET # BLD AUTO: 247 K/UL (ref 150–400)
POTASSIUM SERPL-SCNC: 4.3 MMOL/L (ref 3.5–5.1)
PROT SERPL-MCNC: 6.9 G/DL (ref 6.4–8.2)
RBC # BLD AUTO: 4.04 M/UL (ref 4.2–5.4)
SARS-COV+SARS-COV-2 AG RESP QL IA.RAPID: NEGATIVE
SODIUM SERPL-SCNC: 138 MMOL/L (ref 136–145)
WBC # BLD AUTO: 12.57 K/UL (ref 4.5–11)

## 2022-01-26 PROCEDURE — 82150 ASSAY OF AMYLASE: CPT | Performed by: PEDIATRICS

## 2022-01-26 PROCEDURE — 25000003 PHARM REV CODE 250: Performed by: PEDIATRICS

## 2022-01-26 PROCEDURE — 83690 ASSAY OF LIPASE: CPT | Performed by: PEDIATRICS

## 2022-01-26 PROCEDURE — S0030 INJECTION, METRONIDAZOLE: HCPCS | Performed by: PEDIATRICS

## 2022-01-26 PROCEDURE — 86140 C-REACTIVE PROTEIN: CPT | Performed by: PEDIATRICS

## 2022-01-26 PROCEDURE — 80053 COMPREHEN METABOLIC PANEL: CPT | Performed by: PEDIATRICS

## 2022-01-26 PROCEDURE — 85651 RBC SED RATE NONAUTOMATED: CPT | Performed by: PEDIATRICS

## 2022-01-26 PROCEDURE — 36592 COLLECT BLOOD FROM PICC: CPT | Performed by: PEDIATRICS

## 2022-01-26 PROCEDURE — 87428 SARSCOV & INF VIR A&B AG IA: CPT | Performed by: PEDIATRICS

## 2022-01-26 PROCEDURE — 85025 COMPLETE CBC W/AUTO DIFF WBC: CPT | Performed by: PEDIATRICS

## 2022-01-26 PROCEDURE — 63600175 PHARM REV CODE 636 W HCPCS: Performed by: PEDIATRICS

## 2022-01-26 RX ORDER — CIPROFLOXACIN 500 MG/1
500 TABLET ORAL EVERY 12 HOURS
Qty: 20 TABLET | Refills: 0 | Status: SHIPPED | OUTPATIENT
Start: 2022-01-26 | End: 2022-02-05

## 2022-01-26 RX ORDER — METRONIDAZOLE 500 MG/1
500 TABLET ORAL EVERY 8 HOURS
Qty: 30 TABLET | Refills: 0 | Status: SHIPPED | OUTPATIENT
Start: 2022-01-26 | End: 2022-02-05

## 2022-01-26 RX ORDER — METRONIDAZOLE 500 MG/100ML
500 INJECTION, SOLUTION INTRAVENOUS
Status: COMPLETED | OUTPATIENT
Start: 2022-01-26 | End: 2022-01-26

## 2022-01-26 RX ORDER — MORPHINE SULFATE 8 MG/ML
4 INJECTION INTRAMUSCULAR; INTRAVENOUS; SUBCUTANEOUS
Status: COMPLETED | OUTPATIENT
Start: 2022-01-26 | End: 2022-01-26

## 2022-01-26 RX ORDER — SODIUM CHLORIDE 9 MG/ML
INJECTION, SOLUTION INTRAVENOUS
Status: COMPLETED | OUTPATIENT
Start: 2022-01-26 | End: 2022-01-26

## 2022-01-26 RX ORDER — ONDANSETRON 2 MG/ML
4 INJECTION INTRAMUSCULAR; INTRAVENOUS
Status: COMPLETED | OUTPATIENT
Start: 2022-01-26 | End: 2022-01-26

## 2022-01-26 RX ADMIN — ONDANSETRON 4 MG: 2 INJECTION INTRAMUSCULAR; INTRAVENOUS at 01:01

## 2022-01-26 RX ADMIN — MORPHINE SULFATE 4 MG: 8 INJECTION INTRAVENOUS at 01:01

## 2022-01-26 RX ADMIN — METRONIDAZOLE 500 MG: 500 INJECTION, SOLUTION INTRAVENOUS at 02:01

## 2022-01-26 RX ADMIN — SODIUM CHLORIDE: 9 INJECTION, SOLUTION INTRAVENOUS at 12:01

## 2022-01-26 RX ADMIN — CEFTRIAXONE SODIUM 2 G: 2 INJECTION, POWDER, FOR SOLUTION INTRAMUSCULAR; INTRAVENOUS at 01:01

## 2022-01-26 NOTE — ED PROVIDER NOTES
Encounter Date: 1/25/2022       History     Chief Complaint   Patient presents with    Abdominal Pain     Pt c/o generalized abd pain.  States she has had diverticulitis in the past and this feels the same as with those episodes.  Last BM on 1/24 and no blood noticed     51 year old female with extensive past medical history including history of diverticulitis who presents with abdominal pain that began the night prior.  Currently rates her pain as a 8/10.  Nothing improves the pain but walking or movement worsen the pain.  Patient denies any nausea or vomiting.  States that she has no dysuria or frequency.          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    COPD (chronic obstructive pulmonary disease)     Coronary artery disease     acute MI 02/2014    CRF (chronic renal failure)     GERD (gastroesophageal reflux disease)     Hyperlipidemia     Hypertension     Myocardial infarction     Nonrheumatic mitral (valve) insufficiency 05/21/2018    Sleep apnea 01/06/2020    Stroke     Vitamin D deficiency 07/31/2020     Past Surgical History:   Procedure Laterality Date    CORONARY ANGIOPLASTY WITH STENT PLACEMENT  02/07/2014    HYSTERECTOMY      RADIOFREQUENCY ABLATION Right 10/13/2021    Procedure: RADIOFREQUENCY ABLATION;  Surgeon: David Combs MD;  Location: [facility];  Service: Pain Management;  Laterality: Right;  Right SI RFTC     Family History   Problem Relation Age of Onset    Ovarian cancer Maternal Aunt     Arthritis Mother      Social History     Tobacco Use    Smoking status: Current Every Day  Smoker     Types: Cigarettes    Smokeless tobacco: Never Used   Substance Use Topics    Alcohol use: Yes    Drug use: Never     Review of Systems   Constitutional: Positive for appetite change. Negative for fever.   HENT: Negative.    Respiratory: Negative.    Cardiovascular: Negative.    Gastrointestinal: Positive for abdominal pain and nausea. Negative for diarrhea and vomiting.   Genitourinary: Negative.    Skin: Negative.    Neurological: Negative.    Psychiatric/Behavioral: Negative.        Physical Exam     Initial Vitals [01/25/22 2351]   BP Pulse Resp Temp SpO2   (!) 132/96 83 16 99.6 °F (37.6 °C) 99 %      MAP       --         Physical Exam    Constitutional: She appears well-developed and well-nourished.   HENT:   Head: Normocephalic and atraumatic.   Eyes: EOM are normal. Pupils are equal, round, and reactive to light.   Neck: Neck supple.   Normal range of motion.  Cardiovascular: Normal rate and regular rhythm.   Pulmonary/Chest: Breath sounds normal.   Abdominal: There is abdominal tenderness.   Diffuse abdominal tendernss worst on the right mid abdomen and periumbilical region There is guarding.   Musculoskeletal:      Cervical back: Normal range of motion and neck supple.      Comments: Has a lot of difficulty with laying back due to chronic back pain issues      Neurological: She is alert and oriented to person, place, and time. She has normal reflexes.   Skin: Skin is warm.   Psychiatric: She has a normal mood and affect.         Medical Screening Exam   See Full Note    ED Course   Procedures  Labs Reviewed   COMPREHENSIVE METABOLIC PANEL - Abnormal; Notable for the following components:       Result Value    Anion Gap 17 (*)     Glucose 117 (*)     Creatinine 1.84 (*)     Albumin 3.4 (*)     eGFR 31 (*)     All other components within normal limits   CBC WITH DIFFERENTIAL - Abnormal; Notable for the following components:    WBC 12.57 (*)     RBC 4.04 (*)     Hematocrit 37.7 (*)     MCH 31.7  (*)     Neutrophils % 68.9 (*)     Lymphocytes % 22.5 (*)     Neutrophils, Abs 8.66 (*)     Monocytes % 6.7 (*)     Monocytes, Absolute 0.84 (*)     All other components within normal limits   LIPASE - Normal   SARS-COV2 (COVID) W/ FLU ANTIGEN - Normal    Narrative:     Negative SARS-CoV results should not be used as the sole basis for treatment or patient management decisions; negative results should be considered in the context of a patient's recent exposures, history and the presene of clinical signs and symptoms consistent with COVID-19.  Negative results should be treated as presumptive and confirmed by molecular assay, if necessary for patient management.   SEDIMENTATION RATE, AUTOMATED - Normal   AMYLASE - Normal   CBC W/ AUTO DIFFERENTIAL    Narrative:     The following orders were created for panel order CBC auto differential.  Procedure                               Abnormality         Status                     ---------                               -----------         ------                     CBC with Differential[[id number]]        Abnormal            Final result                 Please view results for these tests on the individual orders.   C-REACTIVE PROTEIN   URINALYSIS, REFLEX TO URINE CULTURE          Imaging Results          CT Abdomen Pelvis  Without Contrast (In process)                  Medications   cefTRIAXone (ROCEPHIN) 2 g in dextrose 5 % in water (D5W) 5 % 50 mL IVPB (MB+) (2 g Intravenous New Bag 1/26/22 0126)   metronidazole IVPB 500 mg (has no administration in time range)   0.9%  NaCl infusion ( Intravenous New Bag 1/26/22 0045)   morphine injection 4 mg (4 mg Intravenous Given 1/26/22 0103)   ondansetron injection 4 mg (4 mg Intravenous Given 1/26/22 0111)     Medical Decision Making:   Initial Assessment:   Patient with a history of diverticulitis who presents with abdominal pain and nausea.    Differential Diagnosis:   Differential is wide and includes nephrolithiasis,  diverticulitis, urinary tract infection, constipation, pancreatitis versus other etiology of abdominal pain.    ED Management:  Patient had a lab workup including a CBC, CMP, lipase, amylase with no evidence of leukocytosis.  CT abdomen consistent with recurrence of diverticultiis.  She was given normal saline at 50 cc/hr during her ER stay, given ceftriaxone and flagyl along with morphine 4 mg with control of her pain.  She was able to tolerate oral fluids.  As such, she is being discharged home on a clear liquid diet, oral antibiotics and is to follow-up with her nurse practitioner in 2-3 days.                     Clinical Impression:   Final diagnoses:  [K57.92] Diverticulitis (Primary)          ED Disposition Condition    Discharge Stable        ED Prescriptions     Medication Sig Dispense Start Date End Date Auth. Provider    ciprofloxacin HCl (CIPRO) 500 MG tablet Take 1 tablet (500 mg total) by mouth every 12 (twelve) hours. for 10 days 20 tablet 1/26/2022 2/5/2022 Yanira Doss MD    metroNIDAZOLE (FLAGYL) 500 MG tablet Take 1 tablet (500 mg total) by mouth every 8 (eight) hours. for 10 days 30 tablet 1/26/2022 2/5/2022 Yanira Doss MD        Follow-up Information     Follow up With Specialties Details Why Contact Info    Katia Zepeda NP Family Medicine In 3 days  [address]  [facility] [place] [address] [phone]             Yanira Doss MD  01/26/22 0152

## 2022-01-26 NOTE — ED TRIAGE NOTES
Pt c/o generalized abd pain.  States she has had diverticulitis in the past and this feels the same as with those episodes.  Last BM on 1/24 and no blood noticed

## 2022-01-27 ENCOUNTER — OFFICE VISIT (OUTPATIENT)
Dept: FAMILY MEDICINE | Facility: CLINIC | Age: 52
End: 2022-01-27
Payer: COMMERCIAL

## 2022-01-27 VITALS
OXYGEN SATURATION: 94 % | BODY MASS INDEX: 38.27 KG/M2 | SYSTOLIC BLOOD PRESSURE: 128 MMHG | TEMPERATURE: 98 F | HEART RATE: 76 BPM | WEIGHT: 230 LBS | RESPIRATION RATE: 20 BRPM | DIASTOLIC BLOOD PRESSURE: 78 MMHG

## 2022-01-27 DIAGNOSIS — K57.90 DIVERTICULOSIS: Primary | ICD-10-CM

## 2022-01-27 DIAGNOSIS — R10.84 GENERALIZED ABDOMINAL PAIN: ICD-10-CM

## 2022-01-27 PROCEDURE — 96372 PR INJECTION,THERAP/PROPH/DIAG2ST, IM OR SUBCUT: ICD-10-PCS | Mod: ,,, | Performed by: NURSE PRACTITIONER

## 2022-01-27 PROCEDURE — 96372 THER/PROPH/DIAG INJ SC/IM: CPT | Mod: ,,, | Performed by: NURSE PRACTITIONER

## 2022-01-27 PROCEDURE — 99214 PR OFFICE/OUTPT VISIT, EST, LEVL IV, 30-39 MIN: ICD-10-PCS | Mod: 25,,, | Performed by: NURSE PRACTITIONER

## 2022-01-27 PROCEDURE — 99214 OFFICE O/P EST MOD 30 MIN: CPT | Mod: 25,,, | Performed by: NURSE PRACTITIONER

## 2022-01-27 PROCEDURE — 3008F PR BODY MASS INDEX (BMI) DOCUMENTED: ICD-10-PCS | Mod: ,,, | Performed by: NURSE PRACTITIONER

## 2022-01-27 PROCEDURE — 1159F PR MEDICATION LIST DOCUMENTED IN MEDICAL RECORD: ICD-10-PCS | Mod: ,,, | Performed by: NURSE PRACTITIONER

## 2022-01-27 PROCEDURE — 3008F BODY MASS INDEX DOCD: CPT | Mod: ,,, | Performed by: NURSE PRACTITIONER

## 2022-01-27 PROCEDURE — 3074F SYST BP LT 130 MM HG: CPT | Mod: ,,, | Performed by: NURSE PRACTITIONER

## 2022-01-27 PROCEDURE — 1159F MED LIST DOCD IN RCRD: CPT | Mod: ,,, | Performed by: NURSE PRACTITIONER

## 2022-01-27 PROCEDURE — 3078F DIAST BP <80 MM HG: CPT | Mod: ,,, | Performed by: NURSE PRACTITIONER

## 2022-01-27 PROCEDURE — 3078F PR MOST RECENT DIASTOLIC BLOOD PRESSURE < 80 MM HG: ICD-10-PCS | Mod: ,,, | Performed by: NURSE PRACTITIONER

## 2022-01-27 PROCEDURE — 3074F PR MOST RECENT SYSTOLIC BLOOD PRESSURE < 130 MM HG: ICD-10-PCS | Mod: ,,, | Performed by: NURSE PRACTITIONER

## 2022-01-27 RX ORDER — KETOROLAC TROMETHAMINE 30 MG/ML
60 INJECTION, SOLUTION INTRAMUSCULAR; INTRAVENOUS
Status: COMPLETED | OUTPATIENT
Start: 2022-01-27 | End: 2022-01-27

## 2022-01-27 RX ORDER — CEFTRIAXONE 1 G/1
1 INJECTION, POWDER, FOR SOLUTION INTRAMUSCULAR; INTRAVENOUS
Status: COMPLETED | OUTPATIENT
Start: 2022-01-27 | End: 2022-01-27

## 2022-01-27 RX ADMIN — CEFTRIAXONE 1 G: 1 INJECTION, POWDER, FOR SOLUTION INTRAMUSCULAR; INTRAVENOUS at 03:01

## 2022-01-27 RX ADMIN — KETOROLAC TROMETHAMINE 60 MG: 30 INJECTION, SOLUTION INTRAMUSCULAR; INTRAVENOUS at 03:01

## 2022-01-27 NOTE — PATIENT INSTRUCTIONS
"Go to [facility] if pain worsens. Since seeing dr price    Patient Education       Diverticulosis Discharge Instructions   About this topic   Diverticulosis is a problem of the large bowel or colon. The wall of the bowel becomes weak and pushes outward. They form balloon-like pouches called diverticula or "tics." When you have hard stool, you strain to have a bowel movement. This raises the pressure in the bowel and causes pouches or bulges to form. Most often, they do not cause a problem. If they become infected, you have diverticulitis. If you have both bleeding and infection it is diverticular disease.     What care is needed at home?   · Ask your doctor what you need to do when you go home. Make sure you ask questions if you do not understand what the doctor says.  · Eat more whole grains, vegetables, and fruits.  · Do not wait to have a bowel movement. Go as soon as you have the urge.  · Drink 8 to 10 glasses of water each day. Talk to your doctor if you are drinking less fluids due to a health problem.  · Be active. Walk, garden, or do something active for 30 minutes or more on most days of the week.  What follow-up care is needed?   Your doctor may ask you to make visits to the office to check on your progress. Be sure to keep these visits.  What drugs may be needed?   Most often with diverticulosis you will not need to take any drugs.  Will physical activity be limited?   When you are in pain, you may need to rest in bed. To ease the pain, use a heat compress on your belly. This should last only for a few days.  What changes to diet are needed?   Talk to your doctor about any changes you need to make to your diet.  · You do not need to avoid seeds, nuts, corn, or other similar foods.   · You will need to eat food rich in fiber and drink more water.  ? Eat 5 or more servings of fresh fruits and vegetables every day.  ? Eat 6 or more servings of whole-wheat grain breads and cereals.  ? Try to get 25 to 30 " grams of fiber every day. Read the labels to learn how much fiber is in foods.   · Do not drink coffee, tea, or beer, wine, and mixed drinks (alcohol).  What problems could happen?   You may develop diverticulitis, which may cause:  · Pockets or pouches in your bowel may be infected or filled with pus.  · Hole or tear in your bowel  · Part of your bowel to become narrow  · You to need surgery  What can be done to prevent this health problem?   The best way to keep from having diverticulosis is to keep your bowel movements soft and normal. To keep more pouches from forming:  · Talk with your doctor about adding an over-the-counter (OTC) fiber product to keep your stools soft.  · Limit how much pain drugs you take. Overuse of some pain drugs can cause hard stools; talk with your doctor.  When do I need to call the doctor?   · Signs of infection. These include a fever of 100.4°F (38°C) or higher, chills.  · Mild pain or cramping in the lower part of the belly  · A feeling of bloating in the belly  · Belly pain that gets worse  · Blood in your stool  · Upset stomach or throwing up  · Stools get too loose or too hard  · Long-term hard stools  Teach Back: Helping You Understand   The Teach Back Method helps you understand the information we are giving you. After you talk with the staff, tell them in your own words what you learned. This helps to make sure the staff has described each thing clearly. It also helps to explain things that may have been confusing. Before going home, make sure you are able to do these:  · I can tell you about my condition.  · I can tell you what changes I need to make with my diet or drugs.  · I can tell you what I will do if I have pain or cramping in my lower belly or I have more belly pain.  Where can I learn more?   [employer]org  http://familydoctor.org/familydoctor/en/diseases-conditions/diverticular-disease.html    [facility]  https://www.nhs.uk/conditions/diverticular-disease-and-diverticulitis/   Last Reviewed Date   2021-04-13  Consumer Information Use and Disclaimer   This information is not specific medical advice and does not replace information you receive from your health care provider. This is only a brief summary of general information. It does NOT include all information about conditions, illnesses, injuries, tests, procedures, treatments, therapies, discharge instructions or life-style choices that may apply to you. You must talk with your health care provider for complete information about your health and treatment options. This information should not be used to decide whether or not to accept your health care providers advice, instructions or recommendations. Only your health care provider has the knowledge and training to provide advice that is right for you.  Copyright   Copyright © 2021 [employer] and its affiliates and/or licensors. All rights reserved.

## 2022-01-27 NOTE — PROGRESS NOTES
Katia Zepeda NP   [facility]  [address]  [facility]     PATIENT NAME: Kirti Morris  : 1970  DATE: 22  MRN: [id number]      Billing Provider: Ktaia Zepeda NP  Level of Service:   Patient PCP Information     Provider PCP Type    Katia Zepeda NP General          Reason for Visit / Chief Complaint: Follow-up (ER for c/o abd pain)       Update PCP  Update Chief Complaint         History of Present Illness / Problem Focused Workflow     Kirti Morris presents to the clinic here for eval of diverticulosis, stated the meds the dr gave her in [facility] er is ot working. Rates pain 9-10      Review of Systems     Review of Systems   Constitutional: Negative for chills, fatigue and fever.   HENT: Negative for nasal congestion, ear pain, facial swelling, hearing loss, mouth dryness, mouth sores, postnasal drip, rhinorrhea, sinus pressure/congestion and goiter.    Eyes: Negative for discharge and itching.   Respiratory: Negative for cough, shortness of breath and wheezing.    Cardiovascular: Negative for chest pain and leg swelling.   Gastrointestinal: Positive for abdominal pain. Negative for change in bowel habit and change in bowel habit.   Genitourinary: Negative for difficulty urinating, dysuria, enuresis, frequency, hematuria and urgency.   Neurological: Negative for dizziness, vertigo, syncope, weakness and headaches.   Psychiatric/Behavioral: Negative for decreased concentration.        Medical / Social / Family History     Past Medical History:   Diagnosis Date    Angina at rest 2021    Anxiety     Arthritis     Asthma     Cerebral infarction 2021    Combined B12 and folate deficiency anemia 2020    COPD (chronic obstructive pulmonary disease)     Coronary artery disease     acute MI 2014    CRF (chronic renal failure)     GERD (gastroesophageal reflux disease)     Hyperlipidemia     Hypertension     Myocardial infarction     Nonrheumatic mitral (valve)  insufficiency 05/21/2018    Sleep apnea 01/06/2020    Stroke     Vitamin D deficiency 07/31/2020       Past Surgical History:   Procedure Laterality Date    CORONARY ANGIOPLASTY WITH STENT PLACEMENT  02/07/2014    HYSTERECTOMY      RADIOFREQUENCY ABLATION Right 10/13/2021    Procedure: RADIOFREQUENCY ABLATION;  Surgeon: David Combs MD;  Location: [facility];  Service: Pain Management;  Laterality: Right;  Right SI RFTC       Social History  Ms.  reports that she has been smoking cigarettes. She has never used smokeless tobacco. She reports current alcohol use. She reports that she does not use drugs.    Family History  Ms.'s family history includes Arthritis in her mother; Ovarian cancer in her maternal aunt.    Medications and Allergies     Medications  Outpatient Medications Marked as Taking for the 1/27/22 encounter (Office Visit) with Katia Zepeda NP   Medication Sig Dispense Refill    albuterol (VENTOLIN HFA) 90 mcg/actuation inhaler Inhale 2 puffs into the lungs 2 (two) times daily as needed (SOB). 2 puffs BID AS NEEDED. Not to exceed 4 times per day. 18 g 0    amLODIPine (NORVASC) 10 MG tablet Take 1 tablet (10 mg total) by mouth once daily. 30 tablet 3    ascorbic acid, vitamin C, (VITAMIN C) 500 MG tablet 500 mg.      aspirin (ECOTRIN) 81 MG EC tablet Take 81 mg by mouth once daily.      budesonide-formoterol 160-4.5 mcg (SYMBICORT) 160-4.5 mcg/actuation HFAA Inhale 2 puffs into the lungs every 12 (twelve) hours. Controller - USES PRN. 10.2 g 3    cetirizine (ZYRTEC) 10 MG tablet Take 1 tablet (10 mg total) by mouth once daily. 30 tablet 3    cholecalciferol, vitamin D3, (VITAMIN D3) 250 mcg (10,000 unit) Cap TAKE ONE CAPSULE BY MOUTH ONE TIME DAILY 90 capsule 0    ciprofloxacin HCl (CIPRO) 500 MG tablet Take 1 tablet (500 mg total) by mouth every 12 (twelve) hours. for 10 days 20 tablet 0    clopidogreL (PLAVIX) 75 mg tablet Take 1 tablet (75 mg total) by mouth once  daily. 30 tablet 3    cyclobenzaprine (FLEXERIL) 10 MG tablet Take 1 tablet (10 mg total) by mouth 3 (three) times daily as needed for Muscle spasms. ONLY TAKES PRN 60 tablet 0    diclofenac sodium (PENNSAID) 20 mg/gram /actuation(2 %) sopm Apply 2 pumps to knees bid 112 g 1    ezetimibe (ZETIA) 10 mg tablet Take 1 tablet (10 mg total) by mouth once daily. 30 tablet 3    famotidine (PEPCID) 20 MG tablet Take 20 mg by mouth Daily.      gabapentin (NEURONTIN) 600 MG tablet Take 1 tablet (600 mg total) by mouth 3 (three) times daily. 90 tablet 3    HYDROcodone-acetaminophen (NORCO) 7.5-325 mg per tablet Take 1 tablet by mouth 2 (two) times daily as needed.      ibuprofen (ADVIL,MOTRIN) 600 MG tablet Take 600 mg by mouth every 8 (eight) hours as needed for Pain.      labetaloL (NORMODYNE) 200 MG tablet Take 1 tablet (200 mg total) by mouth 2 (two) times daily. 60 tablet 3    losartan (COZAAR) 50 MG tablet Take 1 tablet (50 mg total) by mouth once daily. 30 tablet 3    metroNIDAZOLE (FLAGYL) 500 MG tablet Take 1 tablet (500 mg total) by mouth every 8 (eight) hours. for 10 days 30 tablet 0    multivitamin/iron/folic acid (CENTRUM COMPLETE ORAL) Take by mouth Daily.      mv-mn/FA/bl coh/isoflav/jujube (ESTROVEN MENOPAUSE ORAL) Take by mouth once daily.      nitroGLYCERIN (NITROSTAT) 0.4 MG SL tablet DISSOLVE 1 TABLET UNDER THE TONGUE EVERY 5 MINUTES AS NEEDED FOR CHEST PAIN. DO NOT EXCEED A TOTAL OF 3 DOSES IN 15 MINUTES.      pantoprazole (PROTONIX) 40 MG tablet TAKE ONE TABLET BY MOUTH TWICE DAILY 60 tablet 3    REPATHA SURECLICK 140 mg/mL PnIj inject ONE pen into SKIN ONCE every 14 DAYS      zinc gluconate 50 mg tablet Take 50 mg by mouth once daily. Takes as needed         Allergies  Review of patient's allergies indicates:   Allergen Reactions    Atorvastatin      LIPITOR    Levsin [hyoscyamine sulfate]     Nuts [tree nut]      BRAZIL NUTS    Pravastatin     Sulfa (sulfonamide antibiotics)         Physical Examination     Vitals:    01/27/22 1407   BP: 128/78   Pulse: 76   Resp: 20   Temp: 98.1 °F (36.7 °C)   SpO2: (!) 94%   Weight: 104.3 kg (230 lb)      Physical Exam  Constitutional:       Appearance: Normal appearance.   HENT:      Head: Normocephalic.      Right Ear: Tympanic membrane, ear canal and external ear normal.      Left Ear: Tympanic membrane, ear canal and external ear normal.      Nose: Nose normal.      Mouth/Throat:      Mouth: Mucous membranes are moist.      Pharynx: Oropharynx is clear.   Eyes:      Extraocular Movements: Extraocular movements intact.      Conjunctiva/sclera: Conjunctivae normal.      Pupils: Pupils are equal, round, and reactive to light.   Cardiovascular:      Rate and Rhythm: Normal rate and regular rhythm.      Pulses: Normal pulses.      Heart sounds: Normal heart sounds.   Pulmonary:      Effort: Pulmonary effort is normal.      Breath sounds: Normal breath sounds.   Abdominal:      General: Bowel sounds are normal.      Palpations: Abdomen is soft.      Tenderness: There is abdominal tenderness (mod amt generalized tenderness. bs normal. ).   Musculoskeletal:         General: Normal range of motion.   Skin:     General: Skin is warm and dry.      Capillary Refill: Capillary refill takes less than 2 seconds.   Neurological:      General: No focal deficit present.      Mental Status: She is alert and oriented to person, place, and time.   Psychiatric:         Mood and Affect: Mood normal.         Behavior: Behavior normal.          Assessment and Plan (including Health Maintenance)      Problem List  Smart Sets  Document Outside HM   :    Plan:   Diverticulosis diet, meds as ordered, go to [facility] if pain increases as she is seeing dr price for gi eval, rturn to clinicn n 2 weeks   Generalized abdominal pain  -     Ambulatory referral/consult to Gastroenterology; Future; Expected date: 01/27/2022  -     X-Ray Abdomen AP 1 View; Future; Expected date:  01/27/2022    Diverticulosis  -     Ambulatory referral/consult to Gastroenterology; Future; Expected date: 01/27/2022  -     X-Ray Abdomen AP 1 View; Future; Expected date: 01/27/2022            Health Maintenance Due   Topic Date Due    Hepatitis C Screening  Never done    Pneumococcal Vaccines (Age 0-64) (1 of 2 - PPSV23) Never done    HIV Screening  Never done    TETANUS VACCINE  Never done    Shingles Vaccine (1 of 2) Never done       Problem List Items Addressed This Visit    None     Visit Diagnoses     Generalized abdominal pain    -  Primary    Relevant Orders    Ambulatory referral/consult to Gastroenterology    X-Ray Abdomen AP 1 View    Diverticulosis        Relevant Orders    Ambulatory referral/consult to Gastroenterology    X-Ray Abdomen AP 1 View            Health Maintenance Topics with due status: Not Due       Topic Last Completion Date    Mammogram 07/23/2021    Lipid Panel 12/08/2021       Procedures     Future Appointments   Date Time Provider Department Center   3/8/2022 10:15 AM Katia Zepeda NP [facility]        No follow-ups on file.       Signature:  Katia Zepeda NP    Date of encounter: 1/27/22

## 2022-02-10 ENCOUNTER — OFFICE VISIT (OUTPATIENT)
Dept: FAMILY MEDICINE | Facility: CLINIC | Age: 52
End: 2022-02-10
Payer: COMMERCIAL

## 2022-02-10 VITALS
BODY MASS INDEX: 38.32 KG/M2 | SYSTOLIC BLOOD PRESSURE: 132 MMHG | WEIGHT: 230 LBS | HEIGHT: 65 IN | TEMPERATURE: 98 F | RESPIRATION RATE: 18 BRPM | DIASTOLIC BLOOD PRESSURE: 78 MMHG | OXYGEN SATURATION: 98 % | HEART RATE: 61 BPM

## 2022-02-10 DIAGNOSIS — R42 DIZZINESS: ICD-10-CM

## 2022-02-10 DIAGNOSIS — R10.84 GENERALIZED ABDOMINAL PAIN: Primary | ICD-10-CM

## 2022-02-10 DIAGNOSIS — R19.7 DIARRHEA, UNSPECIFIED TYPE: ICD-10-CM

## 2022-02-10 PROCEDURE — 3008F BODY MASS INDEX DOCD: CPT | Mod: ,,, | Performed by: NURSE PRACTITIONER

## 2022-02-10 PROCEDURE — 3075F SYST BP GE 130 - 139MM HG: CPT | Mod: ,,, | Performed by: NURSE PRACTITIONER

## 2022-02-10 PROCEDURE — 3078F DIAST BP <80 MM HG: CPT | Mod: ,,, | Performed by: NURSE PRACTITIONER

## 2022-02-10 PROCEDURE — 99214 OFFICE O/P EST MOD 30 MIN: CPT | Mod: ,,, | Performed by: NURSE PRACTITIONER

## 2022-02-10 PROCEDURE — 3078F PR MOST RECENT DIASTOLIC BLOOD PRESSURE < 80 MM HG: ICD-10-PCS | Mod: ,,, | Performed by: NURSE PRACTITIONER

## 2022-02-10 PROCEDURE — 3008F PR BODY MASS INDEX (BMI) DOCUMENTED: ICD-10-PCS | Mod: ,,, | Performed by: NURSE PRACTITIONER

## 2022-02-10 PROCEDURE — 99214 PR OFFICE/OUTPT VISIT, EST, LEVL IV, 30-39 MIN: ICD-10-PCS | Mod: ,,, | Performed by: NURSE PRACTITIONER

## 2022-02-10 PROCEDURE — 3075F PR MOST RECENT SYSTOLIC BLOOD PRESS GE 130-139MM HG: ICD-10-PCS | Mod: ,,, | Performed by: NURSE PRACTITIONER

## 2022-02-10 RX ORDER — MECLIZINE HCL 12.5 MG 12.5 MG/1
12.5 TABLET ORAL 3 TIMES DAILY PRN
Qty: 30 TABLET | Refills: 0 | Status: SHIPPED | OUTPATIENT
Start: 2022-02-10 | End: 2022-05-05

## 2022-02-10 RX ORDER — LOPERAMIDE HYDROCHLORIDE 2 MG/1
2 CAPSULE ORAL 4 TIMES DAILY PRN
Qty: 30 CAPSULE | Refills: 0 | Status: SHIPPED | OUTPATIENT
Start: 2022-02-10 | End: 2022-02-20

## 2022-02-10 NOTE — PROGRESS NOTES
Katia Zepeda NP   [facility]  [address]  [facility]     PATIENT NAME: Kirti Morris  : 1970  DATE: 2/10/22  MRN: [id number]      Billing Provider: Katia Zepeda NP  Level of Service:   Patient PCP Information     Provider PCP Type    Katia Zepeda NP General          Reason for Visit / Chief Complaint: Diverticulitis and Follow-up (Patient states that she is doing better. )       Update PCP  Update Chief Complaint         History of Present Illness / Problem Focused Workflow     Kirti Morris presents to the clinic here for eval of abd pain, stated abd pain is better, also c/o dizziness and diarrhea after eating      Review of Systems     Review of Systems   Constitutional: Negative for chills, fatigue and fever.   HENT: Negative for nasal congestion, ear pain, facial swelling, hearing loss, mouth dryness, mouth sores, postnasal drip, rhinorrhea, sinus pressure/congestion and goiter.    Eyes: Negative for discharge and itching.   Respiratory: Negative for cough, shortness of breath and wheezing.    Cardiovascular: Negative for chest pain and leg swelling.   Gastrointestinal: Positive for abdominal pain and diarrhea. Negative for change in bowel habit and change in bowel habit.   Genitourinary: Negative for difficulty urinating, dysuria, enuresis, frequency, hematuria and urgency.   Neurological: Positive for dizziness. Negative for vertigo, syncope, weakness and headaches.   Psychiatric/Behavioral: Negative for decreased concentration.        Medical / Social / Family History     Past Medical History:   Diagnosis Date    Angina at rest 2021    Anxiety     Arthritis     Asthma     Cerebral infarction 2021    Combined B12 and folate deficiency anemia 2020    COPD (chronic obstructive pulmonary disease)     Coronary artery disease     acute MI 2014    CRF (chronic renal failure)     GERD (gastroesophageal reflux disease)     Hyperlipidemia     Hypertension      Myocardial infarction     Nonrheumatic mitral (valve) insufficiency 05/21/2018    Sleep apnea 01/06/2020    Stroke     Vitamin D deficiency 07/31/2020       Past Surgical History:   Procedure Laterality Date    CORONARY ANGIOPLASTY WITH STENT PLACEMENT  02/07/2014    HYSTERECTOMY      RADIOFREQUENCY ABLATION Right 10/13/2021    Procedure: RADIOFREQUENCY ABLATION;  Surgeon: David Combs MD;  Location: [facility];  Service: Pain Management;  Laterality: Right;  Right SI RFTC       Social History  Ms.  reports that she has been smoking cigarettes. She has never used smokeless tobacco. She reports current alcohol use. She reports that she does not use drugs.    Family History  Ms.'s family history includes Arthritis in her mother; Ovarian cancer in her maternal aunt.    Medications and Allergies     Medications  Outpatient Medications Marked as Taking for the 2/10/22 encounter (Office Visit) with Katia Zepeda NP   Medication Sig Dispense Refill    albuterol (VENTOLIN HFA) 90 mcg/actuation inhaler Inhale 2 puffs into the lungs 2 (two) times daily as needed (SOB). 2 puffs BID AS NEEDED. Not to exceed 4 times per day. 18 g 0    amLODIPine (NORVASC) 10 MG tablet Take 1 tablet (10 mg total) by mouth once daily. 30 tablet 3    ascorbic acid, vitamin C, (VITAMIN C) 500 MG tablet 500 mg.      aspirin (ECOTRIN) 81 MG EC tablet Take 81 mg by mouth once daily.      budesonide-formoterol 160-4.5 mcg (SYMBICORT) 160-4.5 mcg/actuation HFAA Inhale 2 puffs into the lungs every 12 (twelve) hours. Controller - USES PRN. 10.2 g 3    cetirizine (ZYRTEC) 10 MG tablet Take 1 tablet (10 mg total) by mouth once daily. 30 tablet 3    cholecalciferol, vitamin D3, (VITAMIN D3) 250 mcg (10,000 unit) Cap TAKE ONE CAPSULE BY MOUTH ONE TIME DAILY 90 capsule 0    clopidogreL (PLAVIX) 75 mg tablet Take 1 tablet (75 mg total) by mouth once daily. 30 tablet 3    cyclobenzaprine (FLEXERIL) 10 MG tablet Take 1 tablet  "(10 mg total) by mouth 3 (three) times daily as needed for Muscle spasms. ONLY TAKES PRN 60 tablet 0    diclofenac sodium (PENNSAID) 20 mg/gram /actuation(2 %) sopm Apply 2 pumps to knees bid 112 g 1    ezetimibe (ZETIA) 10 mg tablet Take 1 tablet (10 mg total) by mouth once daily. 30 tablet 3    famotidine (PEPCID) 20 MG tablet Take 20 mg by mouth Daily.      gabapentin (NEURONTIN) 600 MG tablet Take 1 tablet (600 mg total) by mouth 3 (three) times daily. 90 tablet 3    HYDROcodone-acetaminophen (NORCO) 7.5-325 mg per tablet Take 1 tablet by mouth 2 (two) times daily as needed.      ibuprofen (ADVIL,MOTRIN) 600 MG tablet Take 600 mg by mouth every 8 (eight) hours as needed for Pain.      labetaloL (NORMODYNE) 200 MG tablet Take 1 tablet (200 mg total) by mouth 2 (two) times daily. 60 tablet 3    losartan (COZAAR) 50 MG tablet Take 1 tablet (50 mg total) by mouth once daily. 30 tablet 3    multivitamin/iron/folic acid (CENTRUM COMPLETE ORAL) Take by mouth Daily.      mv-mn/FA/bl coh/isoflav/jujube (ESTROVEN MENOPAUSE ORAL) Take by mouth once daily.      nitroGLYCERIN (NITROSTAT) 0.4 MG SL tablet DISSOLVE 1 TABLET UNDER THE TONGUE EVERY 5 MINUTES AS NEEDED FOR CHEST PAIN. DO NOT EXCEED A TOTAL OF 3 DOSES IN 15 MINUTES.      REPATHA SURECLICK 140 mg/mL PnIj inject ONE pen into SKIN ONCE every 14 DAYS      zinc gluconate 50 mg tablet Take 50 mg by mouth once daily. Takes as needed         Allergies  Review of patient's allergies indicates:   Allergen Reactions    Atorvastatin      LIPITOR    Levsin [hyoscyamine sulfate]     Nuts [tree nut]      BRAZIL NUTS    Pravastatin     Sulfa (sulfonamide antibiotics)        Physical Examination     Vitals:    02/10/22 1320   BP: 132/78   BP Location: Right arm   Patient Position: Sitting   BP Method: Medium (Manual)   Pulse: 61   Resp: 18   Temp: 98.2 °F (36.8 °C)   TempSrc: Oral   SpO2: 98%   Weight: 104.3 kg (230 lb)   Height: 5' 5" (1.651 m)      Physical " Exam  Constitutional:       Appearance: Normal appearance.   HENT:      Head: Normocephalic.      Right Ear: Tympanic membrane, ear canal and external ear normal.      Left Ear: Tympanic membrane, ear canal and external ear normal.      Nose: Nose normal.      Mouth/Throat:      Mouth: Mucous membranes are moist.      Pharynx: Oropharynx is clear.   Eyes:      Extraocular Movements: Extraocular movements intact.      Conjunctiva/sclera: Conjunctivae normal.      Pupils: Pupils are equal, round, and reactive to light.   Cardiovascular:      Rate and Rhythm: Normal rate and regular rhythm.      Pulses: Normal pulses.      Heart sounds: Normal heart sounds.   Pulmonary:      Effort: Pulmonary effort is normal.      Breath sounds: Normal breath sounds.   Abdominal:      General: Bowel sounds are normal.      Palpations: Abdomen is soft.   Musculoskeletal:         General: Normal range of motion.      Cervical back: Normal range of motion and neck supple.   Skin:     General: Skin is warm and dry.      Capillary Refill: Capillary refill takes less than 2 seconds.   Neurological:      General: No focal deficit present.      Mental Status: She is alert and oriented to person, place, and time.   Psychiatric:         Mood and Affect: Mood normal.         Behavior: Behavior normal.          Assessment and Plan (including Health Maintenance)      Problem List  Smart Sets  Document Outside HM   :    Plan: clear liquid diet, advance as tolerated, will make referral to gi, return to clinic as needed and as scheduled    Generalized abdominal pain    Diarrhea, unspecified type  -     loperamide (IMODIUM) 2 mg capsule; Take 1 capsule (2 mg total) by mouth 4 (four) times daily as needed for Diarrhea.  Dispense: 30 capsule; Refill: 0  -     Ambulatory referral/consult to Gastroenterology; Future; Expected date: 02/17/2022    Dizziness  -     meclizine (ANTIVERT) 12.5 mg tablet; Take 1 tablet (12.5 mg total) by mouth 3 (three) times  daily as needed for Dizziness.  Dispense: 30 tablet; Refill: 0            Health Maintenance Due   Topic Date Due    Hepatitis C Screening  Never done    Pneumococcal Vaccines (Age 0-64) (1 of 2 - PPSV23) Never done    HIV Screening  Never done    TETANUS VACCINE  Never done    Shingles Vaccine (1 of 2) Never done       Problem List Items Addressed This Visit        GI    Generalized abdominal pain - Primary       Other    Dizziness    Relevant Medications    meclizine (ANTIVERT) 12.5 mg tablet      Other Visit Diagnoses     Diarrhea, unspecified type        Relevant Medications    loperamide (IMODIUM) 2 mg capsule    Other Relevant Orders    Ambulatory referral/consult to Gastroenterology            Health Maintenance Topics with due status: Not Due       Topic Last Completion Date    Mammogram 07/23/2021    Lipid Panel 12/08/2021       Procedures     Future Appointments   Date Time Provider Department Center   3/8/2022 10:15 AM [facility]        Follow up for as scheduled.       Signature:  Katia Zepeda NP    Date of encounter: 2/10/22

## 2022-02-10 NOTE — PATIENT INSTRUCTIONS
Patient Education       Vertigo (a Type of Dizziness) Discharge Instructions   About this topic   Vertigo is a kind of dizziness. It can make you feel like you are spinning, swaying, or tilting. You may also feel like the room is moving around you. You may feel light-headed or have trouble walking straight when you are dizzy. Vertigo can come and go. It might only last a little while or it might last for days.  Vertigo can be caused by many different things, including inner ear problems, infection, migraine, certain medicines, injury, and stroke.     What care is needed at home?   · Ask your doctor what you need to do when you go home. Make sure you ask questions if you do not understand what the doctor says.  · Take extra care to protect yourself from falls.  · Sit or lie down right away if you feel faint or dizzy.  · Avoid driving if you feel dizzy. If you start to feel dizzy while driving, pull over right away.  · You may need to use a cane or walker to help you walk without falling while you are dizzy.  · Take extra care when you change positions.  · Avoid changing your position too quickly. Go slowly when moving from sitting to standing.  · After you wake up, sit on the edge of the bed for a few minutes before you stand up. Start to walk slowly after you stand up.  · Move your legs often if you need to sit or  one position for a long time.  · If you were given exercises to help with your vertigo, follow instructions for how and when to do them.  What follow-up care is needed?   You may need to see your doctor for help with an upset stomach or to give you more fluids if you keep throwing up. Your doctor may ask you to make visits to the office to check on your progress. Be sure to keep these visits.  What drugs may be needed?   The doctor may order drugs to:  · Treat allergies  · Treat infection  · Stop pain  · Block certain messages between nerves  · Calm you and help you relax  · Treat upset stomach or  throwing up  Will physical activity be limited?   Do not drive or work with heavy or dangerous machines until your signs of illness are gone. Climbing can also be risky and should be avoided.  What problems could happen?   · You may have fluid loss due to lots of throwing up.  · You may fall when you lose balance. You could get hurt or break a bone.  When do I need to call the doctor?   · You have signs of stroke like sudden:  ? Numbness or weakness of the face, arm, or leg, especially on one side of the body.  ? Confusion, trouble speaking or understanding.  ? Trouble seeing in one or both eyes.  ? Trouble walking, dizziness, loss of balance or coordination.  ? Severe headache with no known cause.  · You pass out.  · Your vertigo episodes are lasting longer or coming more frequently than they used to.  · You are not able to walk or stand because of your vertigo.  · You continue to throw up.  Teach Back: Helping You Understand   The Teach Back Method helps you understand the information we are giving you. After you talk with the staff, tell them in your own words what you learned. This helps to make sure the staff has described each thing clearly. It also helps to explain things that may have been confusing. Before going home, make sure you can do these:  · I can tell you about my condition.  · I can tell you what I will do to help me stay safe when moving about.  · I can tell you what I will do if I have problems talking or moving.  Where can I learn more?   [facility]  http://www.health.govt.nz/your-health/conditions-and-treatments/diseases-and-illnesses/dizziness   [facility] Choices  http://www.nhs.uk/conditions/dizziness/pages/introduction.aspx   [facility] Choices  http://www.nhs.uk/conditions/vertigo/pages/introduction.aspx   Last Reviewed Date   2021-06-08  Consumer Information Use and Disclaimer   This information is not specific medical advice and does not replace information you receive from your health care  provider. This is only a brief summary of general information. It does NOT include all information about conditions, illnesses, injuries, tests, procedures, treatments, therapies, discharge instructions or life-style choices that may apply to you. You must talk with your health care provider for complete information about your health and treatment options. This information should not be used to decide whether or not to accept your health care providers advice, instructions or recommendations. Only your health care provider has the knowledge and training to provide advice that is right for you.  Copyright   Copyright © 2021 [employer] and its affiliates and/or licensors. All rights reserved.  Patient Education       Diarrhea and Travelers' Diarrhea Discharge Instructions, Adult   About this topic   Most doctors say you have diarrhea if you have 3 or more runny or watery stools or bowel movements in a day. It is travelers' diarrhea if it happens while you travel or soon after you return home.  Diarrhea that starts all of a sudden is most often caused by a virus or bacteria. This will likely get better on its own after a few days. Other things can cause you to have loose stools like:  · Side effects from the medicines you take.  · Problems digesting your food.  · Problems with your digestive system.  What care is needed at home?   · Ask your doctor what you need to do when you go home. Make sure you ask questions if you do not understand what the doctor says.  · Drink small amounts of fluid every 15 to 30 minutes. Good fluids to drink are water, broth, and oral electrolyte solutions. Sugar-free or very low sugar sports drinks are also OK.  · Try to eat a small amount of food. Good foods to eat are potatoes, noodles, rice, oatmeal, crackers, soup, soft vegetables, and bananas. Avoid fatty foods and dairy products.  · Wash your hands often. This will help keep others healthy. It is easy to spread diarrhea from one  person to the next.  · Stay home from school or work until you have stopped having diarrhea. Do not cook food for others while you have diarrhea.  · If you were given any medicines, make sure to take them as you were told.  What follow-up care is needed?   Your doctor may ask you to make visits to the office to check on your progress. Be sure to keep these visits.  What drugs may be needed?   The doctor may order drugs to:  · Prevent infection or kill germs  · Relieve signs of diarrhea  · Prevent throwing up  · Replace fluids and nutrients  Will physical activity be limited?   Yes. You may feel weak because you lose lots of fluid. You need to rest. Ask your doctor about what activities are best for you.  What can be done to prevent this health problem?   To lower your chance of getting diarrhea:  · Always prepare and store food in a proper way.  · If you have diarrhea, don't prepare food for others.       If you are traveling:  · Drink bottled water.  · Use bottled water when brushing your teeth.  · Stay away from drinks that have ice.  · Do not eat food from street vendors.  · Do not eat raw vegetables or fruits. All produce should be peeled and/or cooked.  · Make sure meats and seafood are cooked properly.  · Eat only pasteurized dairy products.  · Always check the utensils to make sure they are clean.     When do I need to call the doctor?   · You have signs of severe fluid loss, such as:  ? No urine for more than 8 hours.  ? Feel very light-headed or like you are going to pass out.  ? Feel weak like you are going to fall.  · You have very bad belly pain.  · You have more than 6 runny, watery stools in 24 hours.  · You have a fever of 101.3°F (38.5°C) or higher or chills that do not go away after a day.  · You have very bad belly pain.  · Your stools have a small amount (less than 1 teaspoon or 5 mL) of blood in them.  · You develop early signs of fluid loss, such as:  ? Your urine is very dark colored.  ? Your  mouth is dry.  ? You have muscle cramps.  ? You have a lack of energy.  ? You feel light-headed when you get up.  ? You have loose stools for more than 2 days.     Teach Back: Helping You Understand   The Teach Back Method helps you understand the information we are giving you. After you talk with the staff, tell them in your own words what you learned. This helps to make sure the staff has described each thing clearly. It also helps to explain things that may have been confusing. Before going home, make sure you can do these:  · I can tell you about my condition.  · I can tell you what precautions to take when traveling to avoid this illness.  · I can tell you what I will do if I have dark colored urine or no urine for more than 8 hours, dry mouth, lack of energy, or I feel faint.  Where can I learn more?   CDC  https://wwwnc.cdc.gov/travel/page/travelers-diarrhea   [employer]  https://www.[employer]/contents/acute-diarrhea-in-adults-beyond-the-basics   Last Reviewed Date   2021-06-10  Consumer Information Use and Disclaimer   This information is not specific medical advice and does not replace information you receive from your health care provider. This is only a brief summary of general information. It does NOT include all information about conditions, illnesses, injuries, tests, procedures, treatments, therapies, discharge instructions or life-style choices that may apply to you. You must talk with your health care provider for complete information about your health and treatment options. This information should not be used to decide whether or not to accept your health care providers advice, instructions or recommendations. Only your health care provider has the knowledge and training to provide advice that is right for you.  Copyright   Copyright © 2021 [employer], [employer]. and its affiliates and/or licensors. All rights reserved.

## 2022-03-17 ENCOUNTER — OFFICE VISIT (OUTPATIENT)
Dept: FAMILY MEDICINE | Facility: CLINIC | Age: 52
End: 2022-03-17
Payer: COMMERCIAL

## 2022-03-17 VITALS
HEIGHT: 65 IN | RESPIRATION RATE: 18 BRPM | SYSTOLIC BLOOD PRESSURE: 122 MMHG | WEIGHT: 236 LBS | TEMPERATURE: 99 F | HEART RATE: 73 BPM | OXYGEN SATURATION: 97 % | DIASTOLIC BLOOD PRESSURE: 62 MMHG | BODY MASS INDEX: 39.32 KG/M2

## 2022-03-17 DIAGNOSIS — R35.0 FREQUENCY OF MICTURITION: ICD-10-CM

## 2022-03-17 DIAGNOSIS — Z13.1 SCREENING FOR DIABETES MELLITUS: ICD-10-CM

## 2022-03-17 DIAGNOSIS — Z00.00 WELLNESS EXAMINATION: Primary | ICD-10-CM

## 2022-03-17 DIAGNOSIS — Z13.220 SCREENING FOR LIPOID DISORDERS: ICD-10-CM

## 2022-03-17 DIAGNOSIS — R89.9 ABNORMAL LABORATORY TEST RESULT: ICD-10-CM

## 2022-03-17 DIAGNOSIS — R30.0 DYSURIA: ICD-10-CM

## 2022-03-17 LAB
BILIRUB SERPL-MCNC: NEGATIVE MG/DL
BLOOD URINE, POC: NEGATIVE
BUN SERPL-MCNC: 17 MG/DL (ref 7–18)
BUN/CREAT SERPL: 10 (ref 6–20)
CHOLEST SERPL-MCNC: 116 MG/DL (ref 0–200)
CHOLEST/HDLC SERPL: 2.4 {RATIO}
COLOR, POC UA: YELLOW
CREAT SERPL-MCNC: 1.74 MG/DL (ref 0.55–1.02)
GLUCOSE SERPL-MCNC: 105 MG/DL (ref 74–106)
GLUCOSE UR QL STRIP: NEGATIVE
HDLC SERPL-MCNC: 49 MG/DL (ref 40–60)
KETONES UR QL STRIP: NEGATIVE
LDLC SERPL CALC-MCNC: 30 MG/DL
LDLC/HDLC SERPL: 0.6 {RATIO}
LEUKOCYTE ESTERASE URINE, POC: NEGATIVE
NITRITE, POC UA: NEGATIVE
NONHDLC SERPL-MCNC: 67 MG/DL
PH, POC UA: 5.5
PROTEIN, POC: NEGATIVE
SPECIFIC GRAVITY, POC UA: 1.02
TRIGL SERPL-MCNC: 184 MG/DL (ref 35–150)
UROBILINOGEN, POC UA: 0.2
VLDLC SERPL-MCNC: 37 MG/DL

## 2022-03-17 PROCEDURE — 1159F PR MEDICATION LIST DOCUMENTED IN MEDICAL RECORD: ICD-10-PCS | Mod: ,,, | Performed by: NURSE PRACTITIONER

## 2022-03-17 PROCEDURE — 82565 ASSAY OF CREATININE: CPT | Mod: ,,, | Performed by: CLINICAL MEDICAL LABORATORY

## 2022-03-17 PROCEDURE — 99396 PREV VISIT EST AGE 40-64: CPT | Mod: ,,, | Performed by: NURSE PRACTITIONER

## 2022-03-17 PROCEDURE — 4010F ACE/ARB THERAPY RXD/TAKEN: CPT | Mod: ,,, | Performed by: NURSE PRACTITIONER

## 2022-03-17 PROCEDURE — 99396 PR PREVENTIVE VISIT,EST,40-64: ICD-10-PCS | Mod: ,,, | Performed by: NURSE PRACTITIONER

## 2022-03-17 PROCEDURE — 81003 POCT URINALYSIS W/O SCOPE: ICD-10-PCS | Mod: QW,,, | Performed by: NURSE PRACTITIONER

## 2022-03-17 PROCEDURE — 84520 BUN+CREATININE: ICD-10-PCS | Mod: ,,, | Performed by: CLINICAL MEDICAL LABORATORY

## 2022-03-17 PROCEDURE — 81003 URINALYSIS AUTO W/O SCOPE: CPT | Mod: QW,,, | Performed by: NURSE PRACTITIONER

## 2022-03-17 PROCEDURE — 3078F DIAST BP <80 MM HG: CPT | Mod: ,,, | Performed by: NURSE PRACTITIONER

## 2022-03-17 PROCEDURE — 3074F PR MOST RECENT SYSTOLIC BLOOD PRESSURE < 130 MM HG: ICD-10-PCS | Mod: ,,, | Performed by: NURSE PRACTITIONER

## 2022-03-17 PROCEDURE — 80061 LIPID PANEL: ICD-10-PCS | Mod: ,,, | Performed by: CLINICAL MEDICAL LABORATORY

## 2022-03-17 PROCEDURE — 82565 BUN+CREATININE: ICD-10-PCS | Mod: ,,, | Performed by: CLINICAL MEDICAL LABORATORY

## 2022-03-17 PROCEDURE — 80061 LIPID PANEL: CPT | Mod: ,,, | Performed by: CLINICAL MEDICAL LABORATORY

## 2022-03-17 PROCEDURE — 84520 ASSAY OF UREA NITROGEN: CPT | Mod: ,,, | Performed by: CLINICAL MEDICAL LABORATORY

## 2022-03-17 PROCEDURE — 82947 GLUCOSE, FASTING: ICD-10-PCS | Mod: ,,, | Performed by: CLINICAL MEDICAL LABORATORY

## 2022-03-17 PROCEDURE — 82947 ASSAY GLUCOSE BLOOD QUANT: CPT | Mod: ,,, | Performed by: CLINICAL MEDICAL LABORATORY

## 2022-03-17 PROCEDURE — 3074F SYST BP LT 130 MM HG: CPT | Mod: ,,, | Performed by: NURSE PRACTITIONER

## 2022-03-17 PROCEDURE — 4010F PR ACE/ARB THEARPY RXD/TAKEN: ICD-10-PCS | Mod: ,,, | Performed by: NURSE PRACTITIONER

## 2022-03-17 PROCEDURE — 3008F PR BODY MASS INDEX (BMI) DOCUMENTED: ICD-10-PCS | Mod: ,,, | Performed by: NURSE PRACTITIONER

## 2022-03-17 PROCEDURE — 3078F PR MOST RECENT DIASTOLIC BLOOD PRESSURE < 80 MM HG: ICD-10-PCS | Mod: ,,, | Performed by: NURSE PRACTITIONER

## 2022-03-17 PROCEDURE — 3008F BODY MASS INDEX DOCD: CPT | Mod: ,,, | Performed by: NURSE PRACTITIONER

## 2022-03-17 PROCEDURE — 1159F MED LIST DOCD IN RCRD: CPT | Mod: ,,, | Performed by: NURSE PRACTITIONER

## 2022-03-17 RX ORDER — CITALOPRAM 20 MG/1
20 TABLET, FILM COATED ORAL DAILY
Qty: 42 TABLET | Refills: 0 | Status: SHIPPED | OUTPATIENT
Start: 2022-03-17 | End: 2022-05-05

## 2022-03-17 NOTE — PROGRESS NOTES
Katia Zepeda NP   [facility]  [address]  [place]     PATIENT NAME: Kirti Morris  : 1970  DATE: 3/17/22  MRN: [id number]      Billing Provider: Katia Zepeda NP  Level of Service:   Patient PCP Information     Provider PCP Type    Katia Zepeda NP General          Reason for Visit / Chief Complaint: Annual Exam (Healthy you) and Headache (C/o headache off and on  x 3 wks)       Update PCP  Update Chief Complaint         History of Present Illness / Problem Focused Workflow     Kirti Morris presents to the clinic here for healthy you. Pt did not mention headache to me while in room, she stated that she was concerned that her urine was clear and didn't think her kidneys were filtering out enough stuff. stated that she has been depressed and crying a lot here lately, recently had loss of favorite aunt. And cries daily      Review of Systems     Review of Systems   Constitutional: Negative for chills, fatigue and fever.   HENT: Negative for nasal congestion, ear pain, facial swelling, hearing loss, mouth dryness, mouth sores, postnasal drip, rhinorrhea, sinus pressure/congestion and goiter.    Eyes: Negative for discharge and itching.   Respiratory: Negative for cough, shortness of breath and wheezing.    Cardiovascular: Negative for chest pain and leg swelling.   Gastrointestinal: Negative for abdominal pain, change in bowel habit and change in bowel habit.   Genitourinary: Negative for difficulty urinating, dysuria, enuresis, frequency, hematuria and urgency.        Clear urine   Neurological: Negative for dizziness, vertigo, syncope, weakness and headaches.   Psychiatric/Behavioral: Negative for decreased concentration.        Crying daily        Medical / Social / Family History     Past Medical History:   Diagnosis Date    Angina at rest 2021    Anxiety     Arthritis     Asthma     Cerebral infarction 2021    Combined B12 and folate deficiency anemia 2020     COPD (chronic obstructive pulmonary disease)     Coronary artery disease     acute MI 02/2014    CRF (chronic renal failure)     GERD (gastroesophageal reflux disease)     Hyperlipidemia     Hypertension     Myocardial infarction     Nonrheumatic mitral (valve) insufficiency 05/21/2018    Sleep apnea 01/06/2020    Stroke     Vitamin D deficiency 07/31/2020       Past Surgical History:   Procedure Laterality Date    CORONARY ANGIOPLASTY WITH STENT PLACEMENT  02/07/2014    HYSTERECTOMY      RADIOFREQUENCY ABLATION Right 10/13/2021    Procedure: RADIOFREQUENCY ABLATION;  Surgeon: David Combs MD;  Location: [facility];  Service: Pain Management;  Laterality: Right;  Right SI RFTC       Social History  Ms.  reports that she has been smoking cigarettes. She has never used smokeless tobacco. She reports current alcohol use. She reports that she does not use drugs.    Family History  Ms.'s family history includes Arthritis in her mother; Ovarian cancer in her maternal aunt.    Medications and Allergies     Medications  Outpatient Medications Marked as Taking for the 3/17/22 encounter (Office Visit) with Katia Zepeda NP   Medication Sig Dispense Refill    albuterol (VENTOLIN HFA) 90 mcg/actuation inhaler Inhale 2 puffs into the lungs 2 (two) times daily as needed (SOB). 2 puffs BID AS NEEDED. Not to exceed 4 times per day. 18 g 0    amLODIPine (NORVASC) 10 MG tablet Take 1 tablet (10 mg total) by mouth once daily. 30 tablet 3    ascorbic acid, vitamin C, (VITAMIN C) 500 MG tablet 500 mg.      aspirin (ECOTRIN) 81 MG EC tablet Take 81 mg by mouth once daily.      budesonide-formoterol 160-4.5 mcg (SYMBICORT) 160-4.5 mcg/actuation HFAA Inhale 2 puffs into the lungs every 12 (twelve) hours. Controller - USES PRN. 10.2 g 3    cetirizine (ZYRTEC) 10 MG tablet Take 1 tablet (10 mg total) by mouth once daily. 30 tablet 3    cholecalciferol, vitamin D3, (VITAMIN D3) 250 mcg (10,000 unit) Cap  TAKE ONE CAPSULE BY MOUTH ONE TIME DAILY 90 capsule 0    clopidogreL (PLAVIX) 75 mg tablet Take 1 tablet (75 mg total) by mouth once daily. 30 tablet 3    cyclobenzaprine (FLEXERIL) 10 MG tablet Take 1 tablet (10 mg total) by mouth 3 (three) times daily as needed for Muscle spasms. ONLY TAKES PRN 60 tablet 0    ezetimibe (ZETIA) 10 mg tablet Take 1 tablet (10 mg total) by mouth once daily. 30 tablet 3    gabapentin (NEURONTIN) 600 MG tablet Take 1 tablet (600 mg total) by mouth 3 (three) times daily. 90 tablet 3    HYDROcodone-acetaminophen (NORCO) 7.5-325 mg per tablet Take 1 tablet by mouth 2 (two) times daily as needed.      ibuprofen (ADVIL,MOTRIN) 600 MG tablet Take 600 mg by mouth every 8 (eight) hours as needed for Pain.      labetaloL (NORMODYNE) 200 MG tablet Take 1 tablet (200 mg total) by mouth 2 (two) times daily. 60 tablet 3    losartan (COZAAR) 50 MG tablet Take 1 tablet (50 mg total) by mouth once daily. 30 tablet 3    multivitamin/iron/folic acid (CENTRUM COMPLETE ORAL) Take by mouth Daily.      mv-mn/FA/bl coh/isoflav/jujube (ESTROVEN MENOPAUSE ORAL) Take by mouth once daily.      nitroGLYCERIN (NITROSTAT) 0.4 MG SL tablet DISSOLVE 1 TABLET UNDER THE TONGUE EVERY 5 MINUTES AS NEEDED FOR CHEST PAIN. DO NOT EXCEED A TOTAL OF 3 DOSES IN 15 MINUTES.      pantoprazole (PROTONIX) 40 MG tablet TAKE ONE TABLET BY MOUTH TWICE DAILY 60 tablet 3    REPATHA SURECLICK 140 mg/mL PnIj inject ONE pen into SKIN ONCE every 14 DAYS         Allergies  Review of patient's allergies indicates:   Allergen Reactions    Atorvastatin      LIPITOR    Levsin [hyoscyamine sulfate]     Nuts [tree nut]      BRAZIL NUTS    Pravastatin     Sulfa (sulfonamide antibiotics)        Physical Examination     Vitals:    03/17/22 1354   BP: 122/62   BP Location: Left arm   Patient Position: Sitting   BP Method: Medium (Manual)   Pulse: 73   Resp: 18   Temp: 98.7 °F (37.1 °C)   TempSrc: Oral   SpO2: 97%   Weight: 107 kg  "(236 lb)   Height: 5' 5" (1.651 m)      Physical Exam  Constitutional:       Appearance: Normal appearance.   HENT:      Head: Normocephalic.      Right Ear: Tympanic membrane, ear canal and external ear normal.      Left Ear: Tympanic membrane, ear canal and external ear normal.      Nose: Nose normal.      Mouth/Throat:      Mouth: Mucous membranes are moist.      Pharynx: Oropharynx is clear.   Eyes:      Extraocular Movements: Extraocular movements intact.      Conjunctiva/sclera: Conjunctivae normal.      Pupils: Pupils are equal, round, and reactive to light.   Cardiovascular:      Rate and Rhythm: Normal rate and regular rhythm.      Pulses: Normal pulses.      Heart sounds: Normal heart sounds.   Pulmonary:      Effort: Pulmonary effort is normal.      Breath sounds: Normal breath sounds.   Abdominal:      General: Bowel sounds are normal.      Palpations: Abdomen is soft.   Musculoskeletal:         General: Normal range of motion.   Skin:     General: Skin is warm and dry.      Capillary Refill: Capillary refill takes less than 2 seconds.   Neurological:      General: No focal deficit present.      Mental Status: She is alert and oriented to person, place, and time.   Psychiatric:         Mood and Affect: Mood normal.         Behavior: Behavior normal.          Assessment and Plan (including Health Maintenance)      Problem List  Smart Sets  Document Outside HM   :    Plan: exercise daily, lose wt, meds as ordered, will try celexa and return to clinic in 1 month fr eval.    Screening for diabetes mellitus  -     Glucose, Fasting; Future; Expected date: 03/17/2022    Screening for lipoid disorders  -     Lipid Panel; Future; Expected date: 03/17/2022    Abnormal labor    Abnormal laboratory test result  -     BUN & Creatinine; Future; Expected date: 03/17/2022            Health Maintenance Due   Topic Date Due    Hepatitis C Screening  Never done    Pneumococcal Vaccines (Age 0-64) (1 of 2 - PPSV23) Never " done    HIV Screening  Never done    TETANUS VACCINE  Never done    Shingles Vaccine (1 of 2) Never done       Problem List Items Addressed This Visit    None     Visit Diagnoses     Screening for diabetes mellitus    -  Primary    Relevant Orders    Glucose, Fasting    Screening for lipoid disorders        Relevant Orders    Lipid Panel    Abnormal labor        Abnormal laboratory test result        Relevant Orders    BUN & Creatinine            Health Maintenance Topics with due status: Not Due       Topic Last Completion Date    Mammogram 07/23/2021    Lipid Panel 12/08/2021       Procedures     Future Appointments   Date Time Provider Department Center   3/20/2023  9:00 AM Katia Zepeda NP [facility]        No follow-ups on file.       Signature:  Katia Zepeda NP    Date of encounter: 3/17/22

## 2022-03-21 NOTE — PROGRESS NOTES
Notify that cr is sl elevated, increase water intake, triglycerides are sl elevated, watch low fat low chol diet, recheck in 6 months

## 2022-04-11 ENCOUNTER — OFFICE VISIT (OUTPATIENT)
Dept: FAMILY MEDICINE | Facility: CLINIC | Age: 52
End: 2022-04-11
Payer: COMMERCIAL

## 2022-04-11 VITALS
WEIGHT: 230.19 LBS | OXYGEN SATURATION: 98 % | TEMPERATURE: 98 F | BODY MASS INDEX: 38.35 KG/M2 | SYSTOLIC BLOOD PRESSURE: 96 MMHG | DIASTOLIC BLOOD PRESSURE: 70 MMHG | HEART RATE: 87 BPM | HEIGHT: 65 IN | RESPIRATION RATE: 20 BRPM

## 2022-04-11 DIAGNOSIS — R52 GENERALIZED BODY ACHES: ICD-10-CM

## 2022-04-11 DIAGNOSIS — J01.00 ACUTE NON-RECURRENT MAXILLARY SINUSITIS: Primary | ICD-10-CM

## 2022-04-11 LAB
CTP QC/QA: YES
FLUAV AG NPH QL: NEGATIVE
FLUBV AG NPH QL: NEGATIVE
SARS-COV-2 AG RESP QL IA.RAPID: NEGATIVE

## 2022-04-11 PROCEDURE — 3008F BODY MASS INDEX DOCD: CPT | Mod: ,,, | Performed by: NURSE PRACTITIONER

## 2022-04-11 PROCEDURE — 3078F PR MOST RECENT DIASTOLIC BLOOD PRESSURE < 80 MM HG: ICD-10-PCS | Mod: ,,, | Performed by: NURSE PRACTITIONER

## 2022-04-11 PROCEDURE — 3074F SYST BP LT 130 MM HG: CPT | Mod: ,,, | Performed by: NURSE PRACTITIONER

## 2022-04-11 PROCEDURE — 4010F ACE/ARB THERAPY RXD/TAKEN: CPT | Mod: ,,, | Performed by: NURSE PRACTITIONER

## 2022-04-11 PROCEDURE — 3078F DIAST BP <80 MM HG: CPT | Mod: ,,, | Performed by: NURSE PRACTITIONER

## 2022-04-11 PROCEDURE — 96372 THER/PROPH/DIAG INJ SC/IM: CPT | Mod: ,,, | Performed by: NURSE PRACTITIONER

## 2022-04-11 PROCEDURE — 3074F PR MOST RECENT SYSTOLIC BLOOD PRESSURE < 130 MM HG: ICD-10-PCS | Mod: ,,, | Performed by: NURSE PRACTITIONER

## 2022-04-11 PROCEDURE — 87428 SARSCOV & INF VIR A&B AG IA: CPT | Mod: QW,,, | Performed by: NURSE PRACTITIONER

## 2022-04-11 PROCEDURE — 96372 PR INJECTION,THERAP/PROPH/DIAG2ST, IM OR SUBCUT: ICD-10-PCS | Mod: ,,, | Performed by: NURSE PRACTITIONER

## 2022-04-11 PROCEDURE — 87428 POCT SARS-COV2 (COVID) WITH FLU ANTIGEN: ICD-10-PCS | Mod: QW,,, | Performed by: NURSE PRACTITIONER

## 2022-04-11 PROCEDURE — 99214 PR OFFICE/OUTPT VISIT, EST, LEVL IV, 30-39 MIN: ICD-10-PCS | Mod: 25,,, | Performed by: NURSE PRACTITIONER

## 2022-04-11 PROCEDURE — 1159F PR MEDICATION LIST DOCUMENTED IN MEDICAL RECORD: ICD-10-PCS | Mod: ,,, | Performed by: NURSE PRACTITIONER

## 2022-04-11 PROCEDURE — 4010F PR ACE/ARB THEARPY RXD/TAKEN: ICD-10-PCS | Mod: ,,, | Performed by: NURSE PRACTITIONER

## 2022-04-11 PROCEDURE — 3008F PR BODY MASS INDEX (BMI) DOCUMENTED: ICD-10-PCS | Mod: ,,, | Performed by: NURSE PRACTITIONER

## 2022-04-11 PROCEDURE — 1159F MED LIST DOCD IN RCRD: CPT | Mod: ,,, | Performed by: NURSE PRACTITIONER

## 2022-04-11 PROCEDURE — 99214 OFFICE O/P EST MOD 30 MIN: CPT | Mod: 25,,, | Performed by: NURSE PRACTITIONER

## 2022-04-11 RX ORDER — FLUTICASONE PROPIONATE 50 MCG
1 SPRAY, SUSPENSION (ML) NASAL DAILY
Qty: 15 G | Refills: 0 | Status: SHIPPED | OUTPATIENT
Start: 2022-04-11 | End: 2023-10-02 | Stop reason: SDUPTHER

## 2022-04-11 RX ORDER — DEXAMETHASONE SODIUM PHOSPHATE 4 MG/ML
4 INJECTION, SOLUTION INTRA-ARTICULAR; INTRALESIONAL; INTRAMUSCULAR; INTRAVENOUS; SOFT TISSUE
Status: COMPLETED | OUTPATIENT
Start: 2022-04-11 | End: 2022-04-11

## 2022-04-11 RX ORDER — CEFTRIAXONE 1 G/1
1 INJECTION, POWDER, FOR SOLUTION INTRAMUSCULAR; INTRAVENOUS
Status: COMPLETED | OUTPATIENT
Start: 2022-04-11 | End: 2022-04-11

## 2022-04-11 RX ORDER — AMOXICILLIN 500 MG/1
500 CAPSULE ORAL EVERY 8 HOURS
Qty: 30 CAPSULE | Refills: 0 | Status: SHIPPED | OUTPATIENT
Start: 2022-04-11 | End: 2022-05-27

## 2022-04-11 RX ORDER — ACETAMINOPHEN, DEXTROMETHORPHAN HBR, GUAIFENESIN, PSEUDOEPHEDRINE HCL 325; 20; 200; 60 MG/1; MG/1; MG/1; MG/1
1 TABLET ORAL EVERY 4 HOURS PRN
Qty: 32 TABLET | Refills: 0 | Status: SHIPPED | OUTPATIENT
Start: 2022-04-11 | End: 2022-04-26

## 2022-04-11 RX ADMIN — DEXAMETHASONE SODIUM PHOSPHATE 4 MG: 4 INJECTION, SOLUTION INTRA-ARTICULAR; INTRALESIONAL; INTRAMUSCULAR; INTRAVENOUS; SOFT TISSUE at 12:04

## 2022-04-11 RX ADMIN — CEFTRIAXONE 1 G: 1 INJECTION, POWDER, FOR SOLUTION INTRAMUSCULAR; INTRAVENOUS at 12:04

## 2022-04-11 NOTE — PROGRESS NOTES
Katia Zepeda NP   [facility]  [address] [facility] [address]  [place]     PATIENT NAME: Kirti Morris  : 1970  DATE: 22  MRN: [id number]      Billing Provider: Katia Zepeda NP  Level of Service:   Patient PCP Information     Provider PCP Type    Katia Zepeda NP General          Reason for Visit / Chief Complaint: Nasal Congestion (Over the weekend), Generalized Body Aches, Cough, Fatigue, Sore Throat, Shortness of Breath, and Depression (Is weening off of celexa)       Update PCP  Update Chief Complaint         History of Present Illness / Problem Focused Workflow     Kirti Morris presents to the clinic c/o nasal congestion over the weekend with generalized body aches, cough, fatigue, sore throat, sob, and is trying to ween off celexa      Review of Systems     Review of Systems   Constitutional: Positive for fatigue. Negative for chills and fever.   HENT: Positive for nasal congestion and sore throat. Negative for ear pain, facial swelling, hearing loss, mouth dryness, mouth sores, postnasal drip, rhinorrhea, sinus pressure/congestion and goiter.    Eyes: Negative for discharge and itching.   Respiratory: Positive for cough and shortness of breath. Negative for wheezing.    Cardiovascular: Negative for chest pain and leg swelling.   Gastrointestinal: Negative for abdominal pain, change in bowel habit and change in bowel habit.   Genitourinary: Negative for difficulty urinating, dysuria, enuresis, frequency, hematuria and urgency.   Musculoskeletal: Positive for myalgias.   Neurological: Negative for dizziness, vertigo, syncope, weakness and headaches.   Psychiatric/Behavioral: Negative for decreased concentration.        Medical / Social / Family History     Past Medical History:   Diagnosis Date    Angina at rest 2021    Anxiety     Arthritis     Asthma     Cerebral infarction 2021    Combined B12 and folate deficiency anemia 2020    COPD (chronic obstructive pulmonary  disease)     Coronary artery disease     acute MI 02/2014    CRF (chronic renal failure)     GERD (gastroesophageal reflux disease)     Hyperlipidemia     Hypertension     Myocardial infarction     Nonrheumatic mitral (valve) insufficiency 05/21/2018    Sleep apnea 01/06/2020    Stroke     Vitamin D deficiency 07/31/2020       Past Surgical History:   Procedure Laterality Date    CORONARY ANGIOPLASTY WITH STENT PLACEMENT  02/07/2014    HYSTERECTOMY      RADIOFREQUENCY ABLATION Right 10/13/2021    Procedure: RADIOFREQUENCY ABLATION;  Surgeon: David Combs MD;  Location: [facility];  Service: Pain Management;  Laterality: Right;  Right SI RFTC       Social History  Ms.  reports that she has been smoking cigarettes. She has been smoking about 1.00 pack per day. She has never used smokeless tobacco. She reports current alcohol use. She reports that she does not use drugs.    Family History  Ms.'s family history includes Arthritis in her mother; Ovarian cancer in her maternal aunt.    Medications and Allergies     Medications  Outpatient Medications Marked as Taking for the 4/11/22 encounter (Office Visit) with Katia Zepeda NP   Medication Sig Dispense Refill    albuterol (VENTOLIN HFA) 90 mcg/actuation inhaler Inhale 2 puffs into the lungs 2 (two) times daily as needed (SOB). 2 puffs BID AS NEEDED. Not to exceed 4 times per day. 18 g 0    amLODIPine (NORVASC) 10 MG tablet Take 1 tablet (10 mg total) by mouth once daily. 30 tablet 3    aspirin (ECOTRIN) 81 MG EC tablet Take 81 mg by mouth once daily.      budesonide-formoterol 160-4.5 mcg (SYMBICORT) 160-4.5 mcg/actuation HFAA Inhale 2 puffs into the lungs every 12 (twelve) hours. Controller - USES PRN. 10.2 g 3    cetirizine (ZYRTEC) 10 MG tablet Take 1 tablet (10 mg total) by mouth once daily. 30 tablet 3    cholecalciferol, vitamin D3, (VITAMIN D3) 250 mcg (10,000 unit) Cap TAKE ONE CAPSULE BY MOUTH ONE TIME DAILY 90 capsule 0     clopidogreL (PLAVIX) 75 mg tablet Take 1 tablet (75 mg total) by mouth once daily. 30 tablet 3    cyclobenzaprine (FLEXERIL) 10 MG tablet Take 1 tablet (10 mg total) by mouth 3 (three) times daily as needed for Muscle spasms. ONLY TAKES PRN 60 tablet 0    ezetimibe (ZETIA) 10 mg tablet Take 1 tablet (10 mg total) by mouth once daily. 30 tablet 3    gabapentin (NEURONTIN) 600 MG tablet Take 1 tablet (600 mg total) by mouth 3 (three) times daily. 90 tablet 3    HYDROcodone-acetaminophen (NORCO) 7.5-325 mg per tablet Take 1 tablet by mouth 2 (two) times daily as needed.      labetaloL (NORMODYNE) 200 MG tablet Take 1 tablet (200 mg total) by mouth 2 (two) times daily. 60 tablet 3    losartan (COZAAR) 50 MG tablet Take 1 tablet (50 mg total) by mouth once daily. 30 tablet 3    multivitamin/iron/folic acid (CENTRUM COMPLETE ORAL) Take by mouth Daily.      mv-mn/FA/bl coh/isoflav/jujube (ESTROVEN MENOPAUSE ORAL) Take by mouth once daily.      nitroGLYCERIN (NITROSTAT) 0.4 MG SL tablet Place 0.4 mg under the tongue every 5 (five) minutes as needed.      pantoprazole (PROTONIX) 40 MG tablet TAKE ONE TABLET BY MOUTH TWICE DAILY 60 tablet 3    REPATHA SURECLICK 140 mg/mL PnIj Inject 140 mg into the skin every 14 (fourteen) days.       Current Facility-Administered Medications for the 4/11/22 encounter (Office Visit) with Katia Zepeda NP   Medication Dose Route Frequency Provider Last Rate Last Admin    [COMPLETED] cefTRIAXone injection 1 g  1 g Intramuscular 1 time in Clinic/HOD Katia Zepeda NP   1 g at 04/11/22 1235    dexamethasone injection 4 mg  4 mg Intramuscular 1 time in Clinic/HOD Katia Zepeda NP           Allergies  Review of patient's allergies indicates:   Allergen Reactions    Atorvastatin      LIPITOR    Levsin [hyoscyamine sulfate]     Nuts [tree nut]      BRAZIL NUTS    Pravastatin     Sulfa (sulfonamide antibiotics)        Physical Examination     Vitals:    04/11/22 1207   BP: 96/70  "  BP Location: Right arm   Patient Position: Sitting   BP Method: Large (Manual)   Pulse: 87   Resp: 20   Temp: 98.1 °F (36.7 °C)   TempSrc: Oral   SpO2: 98%   Weight: 104.4 kg (230 lb 3.2 oz)   Height: 5' 5" (1.651 m)      Physical Exam  Constitutional:       Appearance: Normal appearance.   HENT:      Head: Normocephalic.      Right Ear: Tympanic membrane, ear canal and external ear normal.      Left Ear: Tympanic membrane, ear canal and external ear normal.      Nose: Congestion present.      Comments: Mod amt thick yellow nasal secretion, pressure over max region     Mouth/Throat:      Mouth: Mucous membranes are moist.   Eyes:      Extraocular Movements: Extraocular movements intact.      Conjunctiva/sclera: Conjunctivae normal.      Pupils: Pupils are equal, round, and reactive to light.   Neck:      Comments: Sylvain ant cervical nodes  Cardiovascular:      Rate and Rhythm: Normal rate and regular rhythm.      Pulses: Normal pulses.      Heart sounds: Normal heart sounds.   Pulmonary:      Effort: Pulmonary effort is normal.      Breath sounds: Normal breath sounds.   Musculoskeletal:         General: Normal range of motion.      Cervical back: Normal range of motion.   Lymphadenopathy:      Cervical: Cervical adenopathy present.   Skin:     General: Skin is warm and dry.   Neurological:      General: No focal deficit present.      Mental Status: She is alert and oriented to person, place, and time.   Psychiatric:         Behavior: Behavior normal.          Assessment and Plan (including Health Maintenance)      Problem List  Smart Sets  Document Outside HM   :    Plan: icnrease fluids, avoid irritants, meds as ordered, stop smoking, return to clinic as needed and scheduled    Generalized body aches  -     POCT SARS-COV2 (COVID) with Flu Antigen    Acute non-recurrent maxillary sinusitis  -     cefTRIAXone injection 1 g  -     dexamethasone injection 4 mg    Other orders  -     amoxicillin (AMOXIL) 500 MG " capsule; Take 1 capsule (500 mg total) by mouth every 8 (eight) hours.  Dispense: 30 capsule; Refill: 0  -     docqyxdzo-MK-MH-acetaminophen (DURAFLU) 11--325 mg Tab; Take 1 tablet by mouth every 4 (four) hours as needed (nasal congestion and runny nose).  Dispense: 32 tablet; Refill: 0  -     fluticasone propionate (FLONASE) 50 mcg/actuation nasal spray; 1 spray (50 mcg total) by Each Nostril route once daily.  Dispense: 15 g; Refill: 0            Health Maintenance Due   Topic Date Due    Hepatitis C Screening  Never done    Pneumococcal Vaccines (Age 0-64) (1 - PCV) Never done    HIV Screening  Never done    TETANUS VACCINE  Never done    Shingles Vaccine (1 of 2) Never done       Problem List Items Addressed This Visit    None     Visit Diagnoses     Generalized body aches    -  Primary    Relevant Orders    POCT SARS-COV2 (COVID) with Flu Antigen (Completed)    Acute non-recurrent maxillary sinusitis        Relevant Medications    cefTRIAXone injection 1 g (Completed)    dexamethasone injection 4 mg            Health Maintenance Topics with due status: Not Due       Topic Last Completion Date    Mammogram 07/23/2021    Lipid Panel 03/17/2022       Procedures     Future Appointments   Date Time Provider Department Center   4/26/2022  2:00 PM Katia Zepeda NP [facility]   3/20/2023  9:00 AM [place]ia Zepeda NP [facility] [place]        Follow up if symptoms worsen or fail to improve, for as scheduled.       Signature:  Katia Zepeda NP    Date of encounter: 4/11/22

## 2022-04-25 LAB — CRC RECOMMENDATION EXT: NORMAL

## 2022-04-26 ENCOUNTER — OFFICE VISIT (OUTPATIENT)
Dept: FAMILY MEDICINE | Facility: CLINIC | Age: 52
End: 2022-04-26
Payer: COMMERCIAL

## 2022-04-26 VITALS
OXYGEN SATURATION: 97 % | BODY MASS INDEX: 38.72 KG/M2 | HEART RATE: 77 BPM | RESPIRATION RATE: 18 BRPM | TEMPERATURE: 98 F | WEIGHT: 232.38 LBS | DIASTOLIC BLOOD PRESSURE: 68 MMHG | SYSTOLIC BLOOD PRESSURE: 110 MMHG | HEIGHT: 65 IN

## 2022-04-26 DIAGNOSIS — J44.9 CHRONIC OBSTRUCTIVE PULMONARY DISEASE, UNSPECIFIED COPD TYPE: ICD-10-CM

## 2022-04-26 DIAGNOSIS — R42 DIZZINESS: ICD-10-CM

## 2022-04-26 DIAGNOSIS — R05.9 COUGH: ICD-10-CM

## 2022-04-26 DIAGNOSIS — J40 BRONCHITIS: Primary | ICD-10-CM

## 2022-04-26 PROCEDURE — 99214 OFFICE O/P EST MOD 30 MIN: CPT | Mod: 25,,, | Performed by: NURSE PRACTITIONER

## 2022-04-26 PROCEDURE — 3078F PR MOST RECENT DIASTOLIC BLOOD PRESSURE < 80 MM HG: ICD-10-PCS | Mod: ,,, | Performed by: NURSE PRACTITIONER

## 2022-04-26 PROCEDURE — 87205 CULTURE, LOWER RESPIRATORY: ICD-10-PCS | Mod: ,,, | Performed by: CLINICAL MEDICAL LABORATORY

## 2022-04-26 PROCEDURE — 87070 CULTURE, LOWER RESPIRATORY: ICD-10-PCS | Mod: ,,, | Performed by: CLINICAL MEDICAL LABORATORY

## 2022-04-26 PROCEDURE — 96372 PR INJECTION,THERAP/PROPH/DIAG2ST, IM OR SUBCUT: ICD-10-PCS | Mod: ,,, | Performed by: NURSE PRACTITIONER

## 2022-04-26 PROCEDURE — 87077 CULTURE, LOWER RESPIRATORY: ICD-10-PCS | Mod: ,,, | Performed by: CLINICAL MEDICAL LABORATORY

## 2022-04-26 PROCEDURE — 3074F SYST BP LT 130 MM HG: CPT | Mod: ,,, | Performed by: NURSE PRACTITIONER

## 2022-04-26 PROCEDURE — 3008F PR BODY MASS INDEX (BMI) DOCUMENTED: ICD-10-PCS | Mod: ,,, | Performed by: NURSE PRACTITIONER

## 2022-04-26 PROCEDURE — 99214 PR OFFICE/OUTPT VISIT, EST, LEVL IV, 30-39 MIN: ICD-10-PCS | Mod: 25,,, | Performed by: NURSE PRACTITIONER

## 2022-04-26 PROCEDURE — 4010F PR ACE/ARB THEARPY RXD/TAKEN: ICD-10-PCS | Mod: ,,, | Performed by: NURSE PRACTITIONER

## 2022-04-26 PROCEDURE — 87205 SMEAR GRAM STAIN: CPT | Mod: ,,, | Performed by: CLINICAL MEDICAL LABORATORY

## 2022-04-26 PROCEDURE — 87070 CULTURE OTHR SPECIMN AEROBIC: CPT | Mod: ,,, | Performed by: CLINICAL MEDICAL LABORATORY

## 2022-04-26 PROCEDURE — 96372 THER/PROPH/DIAG INJ SC/IM: CPT | Mod: ,,, | Performed by: NURSE PRACTITIONER

## 2022-04-26 PROCEDURE — 3008F BODY MASS INDEX DOCD: CPT | Mod: ,,, | Performed by: NURSE PRACTITIONER

## 2022-04-26 PROCEDURE — 1159F PR MEDICATION LIST DOCUMENTED IN MEDICAL RECORD: ICD-10-PCS | Mod: ,,, | Performed by: NURSE PRACTITIONER

## 2022-04-26 PROCEDURE — 4010F ACE/ARB THERAPY RXD/TAKEN: CPT | Mod: ,,, | Performed by: NURSE PRACTITIONER

## 2022-04-26 PROCEDURE — 87186 SC STD MICRODIL/AGAR DIL: CPT | Mod: ,,, | Performed by: CLINICAL MEDICAL LABORATORY

## 2022-04-26 PROCEDURE — 3078F DIAST BP <80 MM HG: CPT | Mod: ,,, | Performed by: NURSE PRACTITIONER

## 2022-04-26 PROCEDURE — 87077 CULTURE AEROBIC IDENTIFY: CPT | Mod: ,,, | Performed by: CLINICAL MEDICAL LABORATORY

## 2022-04-26 PROCEDURE — 3074F PR MOST RECENT SYSTOLIC BLOOD PRESSURE < 130 MM HG: ICD-10-PCS | Mod: ,,, | Performed by: NURSE PRACTITIONER

## 2022-04-26 PROCEDURE — 87186 CULTURE, LOWER RESPIRATORY: ICD-10-PCS | Mod: ,,, | Performed by: CLINICAL MEDICAL LABORATORY

## 2022-04-26 PROCEDURE — 1159F MED LIST DOCD IN RCRD: CPT | Mod: ,,, | Performed by: NURSE PRACTITIONER

## 2022-04-26 RX ORDER — DEXAMETHASONE SODIUM PHOSPHATE 4 MG/ML
4 INJECTION, SOLUTION INTRA-ARTICULAR; INTRALESIONAL; INTRAMUSCULAR; INTRAVENOUS; SOFT TISSUE
Status: COMPLETED | OUTPATIENT
Start: 2022-04-26 | End: 2022-04-26

## 2022-04-26 RX ORDER — GUAIFENESIN/DEXTROMETHORPHAN 100-10MG/5
5 SYRUP ORAL EVERY 6 HOURS PRN
Qty: 120 ML | Refills: 0 | Status: SHIPPED | OUTPATIENT
Start: 2022-04-26 | End: 2022-05-05

## 2022-04-26 RX ORDER — CEFTRIAXONE 1 G/1
1 INJECTION, POWDER, FOR SOLUTION INTRAMUSCULAR; INTRAVENOUS
Status: COMPLETED | OUTPATIENT
Start: 2022-04-26 | End: 2022-04-26

## 2022-04-26 RX ORDER — LEVOFLOXACIN 500 MG/1
500 TABLET, FILM COATED ORAL DAILY
Qty: 7 TABLET | Refills: 0 | Status: SHIPPED | OUTPATIENT
Start: 2022-04-26 | End: 2022-05-05

## 2022-04-26 RX ADMIN — CEFTRIAXONE 1 G: 1 INJECTION, POWDER, FOR SOLUTION INTRAMUSCULAR; INTRAVENOUS at 05:04

## 2022-04-26 RX ADMIN — DEXAMETHASONE SODIUM PHOSPHATE 4 MG: 4 INJECTION, SOLUTION INTRA-ARTICULAR; INTRALESIONAL; INTRAMUSCULAR; INTRAVENOUS; SOFT TISSUE at 05:04

## 2022-04-26 NOTE — PROGRESS NOTES
Katia Zepeda NP   [facility]  [address]  [place]     PATIENT NAME: Kirti Morris  : 1970  DATE: 22  MRN: [id number]      Billing Provider: Katia Zepeda NP  Level of Service:   Patient PCP Information     Provider PCP Type    Katia Zepeda NP General          Reason for Visit / Chief Complaint: Follow-up (One month follow up ), Hypertension, and Hyperlipidemia       Update PCP  Update Chief Complaint         History of Present Illness / Problem Focused Workflow     Kirti Morris presents to the clinic pt c/o vertigo, htn, and hyperlipidemia, has not been using Symbicort, pt keeps talking about hitting her head when she fell, had mri that was normal  C/o coughing up dark green sputum. Post vs were obtained and pressure did drop miesha 20 points frdm lying to standing.       Review of Systems     Review of Systems   Constitutional: Positive for fatigue. Negative for chills and fever.   HENT: Positive for ear pain. Negative for nasal congestion, facial swelling, hearing loss, mouth dryness, mouth sores, postnasal drip, rhinorrhea, sinus pressure/congestion and goiter.    Eyes: Negative for discharge and itching.   Respiratory: Positive for cough. Negative for shortness of breath and wheezing.    Cardiovascular: Negative for chest pain and leg swelling.   Gastrointestinal: Negative for abdominal pain, change in bowel habit and change in bowel habit.   Genitourinary: Negative for difficulty urinating, dysuria, enuresis, frequency, hematuria and urgency.   Neurological: Positive for dizziness and headaches. Negative for vertigo, syncope and weakness.   Psychiatric/Behavioral: Negative for decreased concentration.        Medical / Social / Family History     Past Medical History:   Diagnosis Date    Angina at rest 2021    Anxiety     Arthritis     Asthma     Cerebral infarction 2021    Combined B12 and folate deficiency anemia 2020    COPD (chronic obstructive  pulmonary disease)     Coronary artery disease     acute MI 02/2014    CRF (chronic renal failure)     GERD (gastroesophageal reflux disease)     Hyperlipidemia     Hypertension     Myocardial infarction     Nonrheumatic mitral (valve) insufficiency 05/21/2018    Sleep apnea 01/06/2020    Stroke     Vitamin D deficiency 07/31/2020       Past Surgical History:   Procedure Laterality Date    CORONARY ANGIOPLASTY WITH STENT PLACEMENT  02/07/2014    HYSTERECTOMY      RADIOFREQUENCY ABLATION Right 10/13/2021    Procedure: RADIOFREQUENCY ABLATION;  Surgeon: David Combs MD;  Location: [facility];  Service: Pain Management;  Laterality: Right;  Right SI RFTC       Social History  Ms.  reports that she has been smoking cigarettes. She has been smoking about 1.00 pack per day. She has never used smokeless tobacco. She reports current alcohol use. She reports that she does not use drugs.    Family History  Ms.'s family history includes Arthritis in her mother; Ovarian cancer in her maternal aunt.    Medications and Allergies     Medications  Outpatient Medications Marked as Taking for the 4/26/22 encounter (Office Visit) with Katia Zepeda NP   Medication Sig Dispense Refill    albuterol (VENTOLIN HFA) 90 mcg/actuation inhaler Inhale 2 puffs into the lungs 2 (two) times daily as needed (SOB). 2 puffs BID AS NEEDED. Not to exceed 4 times per day. 18 g 0    amLODIPine (NORVASC) 10 MG tablet Take 1 tablet (10 mg total) by mouth once daily. 30 tablet 3    ascorbic acid, vitamin C, (VITAMIN C) 500 MG tablet 500 mg.      aspirin (ECOTRIN) 81 MG EC tablet Take 81 mg by mouth once daily.      budesonide-formoterol 160-4.5 mcg (SYMBICORT) 160-4.5 mcg/actuation HFAA Inhale 2 puffs into the lungs every 12 (twelve) hours. Controller - USES PRN. 10.2 g 3    cetirizine (ZYRTEC) 10 MG tablet Take 1 tablet (10 mg total) by mouth once daily. 30 tablet 3    cholecalciferol, vitamin D3, (VITAMIN D3) 250 mcg  (10,000 unit) Cap TAKE ONE CAPSULE BY MOUTH ONE TIME DAILY 90 capsule 0    clopidogreL (PLAVIX) 75 mg tablet Take 1 tablet (75 mg total) by mouth once daily. 30 tablet 3    cyclobenzaprine (FLEXERIL) 10 MG tablet Take 1 tablet (10 mg total) by mouth 3 (three) times daily as needed for Muscle spasms. ONLY TAKES PRN 60 tablet 0    fluticasone propionate (FLONASE) 50 mcg/actuation nasal spray 1 spray (50 mcg total) by Each Nostril route once daily. 15 g 0    gabapentin (NEURONTIN) 600 MG tablet Take 1 tablet (600 mg total) by mouth 3 (three) times daily. 90 tablet 3    HYDROcodone-acetaminophen (NORCO) 7.5-325 mg per tablet Take 1 tablet by mouth 2 (two) times daily as needed.      ibuprofen (ADVIL,MOTRIN) 600 MG tablet Take 600 mg by mouth every 8 (eight) hours as needed for Pain.      labetaloL (NORMODYNE) 200 MG tablet Take 1 tablet (200 mg total) by mouth 2 (two) times daily. 60 tablet 3    losartan (COZAAR) 50 MG tablet Take 1 tablet (50 mg total) by mouth once daily. 30 tablet 3    multivitamin/iron/folic acid (CENTRUM COMPLETE ORAL) Take by mouth Daily.      mv-mn/FA/bl coh/isoflav/jujube (ESTROVEN MENOPAUSE ORAL) Take by mouth once daily.      nitroGLYCERIN (NITROSTAT) 0.4 MG SL tablet Place 0.4 mg under the tongue every 5 (five) minutes as needed.      pantoprazole (PROTONIX) 40 MG tablet TAKE ONE TABLET BY MOUTH TWICE DAILY 60 tablet 3    REPATHA SURECLICK 140 mg/mL PnIj Inject 140 mg into the skin every 14 (fourteen) days.         Allergies  Review of patient's allergies indicates:   Allergen Reactions    Atorvastatin      LIPITOR    Levsin [hyoscyamine sulfate]     Nuts [tree nut]      BRAZIL NUTS    Pravastatin     Sulfa (sulfonamide antibiotics)        Physical Examination     Vitals:    04/26/22 1608   BP: 102/70   BP Location: Right arm   Patient Position: Sitting   BP Method: Medium (Manual)   Pulse: 77   Resp: 18   Temp: 98.4 °F (36.9 °C)   TempSrc: Oral   SpO2: 97%   Weight: 105.4  "kg (232 lb 6 oz)   Height: 5' 5" (1.651 m)      Physical Exam  Vitals and nursing note reviewed.   Constitutional:       Appearance: Normal appearance.   HENT:      Head: Normocephalic.      Right Ear: Tympanic membrane, ear canal and external ear normal.      Left Ear: Tympanic membrane, ear canal and external ear normal.      Nose: Nose normal.      Mouth/Throat:      Mouth: Mucous membranes are moist.      Pharynx: Oropharynx is clear.   Eyes:      Extraocular Movements: Extraocular movements intact.      Conjunctiva/sclera: Conjunctivae normal.      Pupils: Pupils are equal, round, and reactive to light.   Cardiovascular:      Rate and Rhythm: Normal rate and regular rhythm.      Pulses: Normal pulses.      Heart sounds: Normal heart sounds.   Pulmonary:      Effort: Pulmonary effort is normal.      Breath sounds: Normal breath sounds.      Comments: Thick green sputum was expelled with cough.  Abdominal:      General: Bowel sounds are normal.      Palpations: Abdomen is soft.   Musculoskeletal:         General: Normal range of motion.      Cervical back: Normal range of motion and neck supple.   Skin:     General: Skin is warm and dry.      Capillary Refill: Capillary refill takes less than 2 seconds.   Neurological:      General: No focal deficit present.      Mental Status: She is alert and oriented to person, place, and time.   Psychiatric:         Mood and Affect: Mood normal.         Behavior: Behavior normal.          Assessment and Plan (including Health Maintenance)      Problem List  Smart Sets  Document Outside HM   :    Plan: vertigo maneuver was performed on pt, also instructed she could watch on you tube if she did not know how to do them again, will refer to dr lopez for eval of copd, stressed to use symbicort as ordered, meds as ordered, refer to dr granados for eval of dizziness. Return to clinic wed for eval, stressed to raise from bed slowly and sit on side before trying to ambulate, stand up " for a few sec before trying to walk,     There are no diagnoses linked to this encounter.        Health Maintenance Due   Topic Date Due    Hepatitis C Screening  Never done    Pneumococcal Vaccines (Age 0-64) (1 - PCV) Never done    HIV Screening  Never done    TETANUS VACCINE  Never done    Shingles Vaccine (1 of 2) Never done       Problem List Items Addressed This Visit    None           Health Maintenance Topics with due status: Not Due       Topic Last Completion Date    Mammogram 07/23/2021    Lipid Panel 03/17/2022       Procedures     Future Appointments   Date Time Provider Department Center   3/20/2023  9:00 AM Katia Zepeda NP [facility]        No follow-ups on file.       Signature:  Katia Zepeda NP    Date of encounter: 4/26/22

## 2022-04-28 NOTE — TELEPHONE ENCOUNTER
----- Message from Sparkle Redmond sent at 4/27/2022  1:21 PM CDT -----  Regarding: refill  Kirti called back wanted to know if she could have a refill on her Ventolin. She forgot to ask yesterday. Thanks

## 2022-04-29 RX ORDER — ALBUTEROL SULFATE 90 UG/1
2 AEROSOL, METERED RESPIRATORY (INHALATION) 2 TIMES DAILY PRN
Qty: 18 G | Refills: 0 | Status: SHIPPED | OUTPATIENT
Start: 2022-04-29 | End: 2022-09-01

## 2022-04-29 RX ORDER — FLUCONAZOLE 150 MG/1
150 TABLET ORAL DAILY
Qty: 7 TABLET | Refills: 0 | Status: SHIPPED | OUTPATIENT
Start: 2022-04-29 | End: 2022-04-30

## 2022-04-30 LAB
CULTURE, LOWER RESPIRATORY: ABNORMAL
GRAM STN SPEC: ABNORMAL

## 2022-05-04 ENCOUNTER — CLINICAL SUPPORT (OUTPATIENT)
Dept: FAMILY MEDICINE | Facility: CLINIC | Age: 52
End: 2022-05-04
Payer: COMMERCIAL

## 2022-05-04 DIAGNOSIS — J40 BRONCHITIS: Primary | ICD-10-CM

## 2022-05-04 PROCEDURE — 96372 THER/PROPH/DIAG INJ SC/IM: CPT | Mod: ,,, | Performed by: NURSE PRACTITIONER

## 2022-05-04 PROCEDURE — 96372 PR INJECTION,THERAP/PROPH/DIAG2ST, IM OR SUBCUT: ICD-10-PCS | Mod: ,,, | Performed by: NURSE PRACTITIONER

## 2022-05-04 RX ADMIN — CEFTRIAXONE 1 G: 1 INJECTION, POWDER, FOR SOLUTION INTRAMUSCULAR; INTRAVENOUS at 04:05

## 2022-05-05 ENCOUNTER — OFFICE VISIT (OUTPATIENT)
Dept: FAMILY MEDICINE | Facility: CLINIC | Age: 52
End: 2022-05-05
Payer: COMMERCIAL

## 2022-05-05 VITALS
BODY MASS INDEX: 38.72 KG/M2 | TEMPERATURE: 98 F | SYSTOLIC BLOOD PRESSURE: 132 MMHG | DIASTOLIC BLOOD PRESSURE: 80 MMHG | OXYGEN SATURATION: 99 % | HEART RATE: 70 BPM | WEIGHT: 232.38 LBS | HEIGHT: 65 IN | RESPIRATION RATE: 18 BRPM

## 2022-05-05 DIAGNOSIS — J01.01 ACUTE RECURRENT MAXILLARY SINUSITIS: Primary | ICD-10-CM

## 2022-05-05 PROCEDURE — 3079F PR MOST RECENT DIASTOLIC BLOOD PRESSURE 80-89 MM HG: ICD-10-PCS | Mod: ,,, | Performed by: NURSE PRACTITIONER

## 2022-05-05 PROCEDURE — 1159F MED LIST DOCD IN RCRD: CPT | Mod: ,,, | Performed by: NURSE PRACTITIONER

## 2022-05-05 PROCEDURE — 3008F BODY MASS INDEX DOCD: CPT | Mod: ,,, | Performed by: NURSE PRACTITIONER

## 2022-05-05 PROCEDURE — 3075F PR MOST RECENT SYSTOLIC BLOOD PRESS GE 130-139MM HG: ICD-10-PCS | Mod: ,,, | Performed by: NURSE PRACTITIONER

## 2022-05-05 PROCEDURE — 3079F DIAST BP 80-89 MM HG: CPT | Mod: ,,, | Performed by: NURSE PRACTITIONER

## 2022-05-05 PROCEDURE — 99214 PR OFFICE/OUTPT VISIT, EST, LEVL IV, 30-39 MIN: ICD-10-PCS | Mod: ,,, | Performed by: NURSE PRACTITIONER

## 2022-05-05 PROCEDURE — 99214 OFFICE O/P EST MOD 30 MIN: CPT | Mod: ,,, | Performed by: NURSE PRACTITIONER

## 2022-05-05 PROCEDURE — 4010F PR ACE/ARB THEARPY RXD/TAKEN: ICD-10-PCS | Mod: ,,, | Performed by: NURSE PRACTITIONER

## 2022-05-05 PROCEDURE — 4010F ACE/ARB THERAPY RXD/TAKEN: CPT | Mod: ,,, | Performed by: NURSE PRACTITIONER

## 2022-05-05 PROCEDURE — 3008F PR BODY MASS INDEX (BMI) DOCUMENTED: ICD-10-PCS | Mod: ,,, | Performed by: NURSE PRACTITIONER

## 2022-05-05 PROCEDURE — 3075F SYST BP GE 130 - 139MM HG: CPT | Mod: ,,, | Performed by: NURSE PRACTITIONER

## 2022-05-05 PROCEDURE — 1159F PR MEDICATION LIST DOCUMENTED IN MEDICAL RECORD: ICD-10-PCS | Mod: ,,, | Performed by: NURSE PRACTITIONER

## 2022-05-05 RX ORDER — FLUTICASONE PROPIONATE 50 MCG
1 SPRAY, SUSPENSION (ML) NASAL DAILY
Qty: 15.8 ML | Refills: 0 | Status: SHIPPED | OUTPATIENT
Start: 2022-05-05 | End: 2022-05-27

## 2022-05-05 RX ORDER — ACETAMINOPHEN, DEXTROMETHORPHAN HBR, GUAIFENESIN, PSEUDOEPHEDRINE HCL 325; 20; 200; 60 MG/1; MG/1; MG/1; MG/1
1 TABLET ORAL EVERY 4 HOURS PRN
Qty: 32 TABLET | Refills: 0 | Status: SHIPPED | OUTPATIENT
Start: 2022-05-05 | End: 2022-05-26

## 2022-05-05 RX ORDER — DOXYCYCLINE 100 MG/1
100 CAPSULE ORAL EVERY 12 HOURS
Qty: 20 CAPSULE | Refills: 0 | Status: SHIPPED | OUTPATIENT
Start: 2022-05-05 | End: 2022-05-27

## 2022-05-05 NOTE — PROGRESS NOTES
Katia Zepeda NP   [facility]  [address]  [facility]     PATIENT NAME: Kirti Morris  : 1970  DATE: 22  MRN: [id number]      Billing Provider: Katia Zepeda NP  Level of Service:   Patient PCP Information     Provider PCP Type    Katia Zepeda NP General          Reason for Visit / Chief Complaint: Sore Throat, Headache, and Nasal Congestion       Update PCP  Update Chief Complaint         History of Present Illness / Problem Focused Workflow     Kirti Morris presents to the clinic   C/o sore thorat, headache and nasal congestion, uncontrolled sneezing, sore throat that is worse in the am, feels like hot air is in her nose and then she stops up    Review of Systems     Review of Systems   Constitutional: Negative for chills, fatigue and fever.   HENT: Positive for sore throat. Negative for nasal congestion, ear pain, facial swelling, hearing loss, mouth dryness, mouth sores, postnasal drip, rhinorrhea, sinus pressure/congestion and goiter.    Eyes: Negative for discharge and itching.   Respiratory: Negative for cough, shortness of breath and wheezing.    Cardiovascular: Negative for chest pain and leg swelling.   Gastrointestinal: Negative for abdominal pain, change in bowel habit and change in bowel habit.   Genitourinary: Negative for difficulty urinating, dysuria, enuresis, frequency, hematuria and urgency.   Neurological: Negative for dizziness, vertigo, syncope, weakness and headaches.   Psychiatric/Behavioral: Negative for decreased concentration.        Medical / Social / Family History     Past Medical History:   Diagnosis Date    Angina at rest 2021    Anxiety     Arthritis     Asthma     Cerebral infarction 2021    Combined B12 and folate deficiency anemia 2020    COPD (chronic obstructive pulmonary disease)     Coronary artery disease     acute MI 2014    CRF (chronic renal failure)     GERD (gastroesophageal reflux disease)      Hyperlipidemia     Hypertension     Myocardial infarction     Nonrheumatic mitral (valve) insufficiency 05/21/2018    Sleep apnea 01/06/2020    Stroke     Vitamin D deficiency 07/31/2020       Past Surgical History:   Procedure Laterality Date    CORONARY ANGIOPLASTY WITH STENT PLACEMENT  02/07/2014    HYSTERECTOMY      RADIOFREQUENCY ABLATION Right 10/13/2021    Procedure: RADIOFREQUENCY ABLATION;  Surgeon: David Combs MD;  Location[facility];  Service: Pain Management;  Laterality: Right;  Right SI RFTC       Social History  Ms.  reports that she has been smoking cigarettes. She has been smoking about 1.00 pack per day. She has never used smokeless tobacco. She reports current alcohol use. She reports that she does not use drugs.    Family History  Ms.'s family history includes Arthritis in her mother; Ovarian cancer in her maternal aunt.    Medications and Allergies     Medications  Outpatient Medications Marked as Taking for the 5/5/22 encounter (Office Visit) with Katia Zepeda NP   Medication Sig Dispense Refill    albuterol (VENTOLIN HFA) 90 mcg/actuation inhaler Inhale 2 puffs into the lungs 2 (two) times daily as needed (SOB). 2 puffs BID AS NEEDED. Not to exceed 4 times per day. 18 g 0    amLODIPine (NORVASC) 10 MG tablet Take 1 tablet (10 mg total) by mouth once daily. 30 tablet 3    ascorbic acid, vitamin C, (VITAMIN C) 500 MG tablet 500 mg.      aspirin (ECOTRIN) 81 MG EC tablet Take 81 mg by mouth once daily.      budesonide-formoterol 160-4.5 mcg (SYMBICORT) 160-4.5 mcg/actuation HFAA Inhale 2 puffs into the lungs every 12 (twelve) hours. Controller - USES PRN. 10.2 g 3    cetirizine (ZYRTEC) 10 MG tablet Take 1 tablet (10 mg total) by mouth once daily. 30 tablet 3    cholecalciferol, vitamin D3, (VITAMIN D3) 250 mcg (10,000 unit) Cap TAKE ONE CAPSULE BY MOUTH ONE TIME DAILY 90 capsule 0    clopidogreL (PLAVIX) 75 mg tablet Take 1 tablet (75 mg total) by mouth  once daily. 30 tablet 3    cyclobenzaprine (FLEXERIL) 10 MG tablet Take 1 tablet (10 mg total) by mouth 3 (three) times daily as needed for Muscle spasms. ONLY TAKES PRN 60 tablet 0    famotidine (PEPCID) 20 MG tablet Take 20 mg by mouth Daily.      fluticasone propionate (FLONASE) 50 mcg/actuation nasal spray 1 spray (50 mcg total) by Each Nostril route once daily. 15 g 0    gabapentin (NEURONTIN) 600 MG tablet Take 1 tablet (600 mg total) by mouth 3 (three) times daily. 90 tablet 3    HYDROcodone-acetaminophen (NORCO) 7.5-325 mg per tablet Take 1 tablet by mouth 2 (two) times daily as needed.      ibuprofen (ADVIL,MOTRIN) 600 MG tablet Take 600 mg by mouth every 8 (eight) hours as needed for Pain.      labetaloL (NORMODYNE) 200 MG tablet Take 1 tablet (200 mg total) by mouth 2 (two) times daily. 60 tablet 3    losartan (COZAAR) 50 MG tablet Take 1 tablet (50 mg total) by mouth once daily. 30 tablet 3    multivitamin/iron/folic acid (CENTRUM COMPLETE ORAL) Take by mouth Daily.      mv-mn/FA/bl coh/isoflav/jujube (ESTROVEN MENOPAUSE ORAL) Take by mouth once daily.      nitroGLYCERIN (NITROSTAT) 0.4 MG SL tablet Place 0.4 mg under the tongue every 5 (five) minutes as needed.      pantoprazole (PROTONIX) 40 MG tablet TAKE ONE TABLET BY MOUTH TWICE DAILY 60 tablet 3    REPATHA SURECLICK 140 mg/mL PnIj Inject 140 mg into the skin every 14 (fourteen) days.      zinc gluconate 50 mg tablet Take 50 mg by mouth once daily. Takes as needed      [DISCONTINUED] dextromethorphan-guaiFENesin  mg/5 ml (ROBITUSSIN-DM)  mg/5 mL liquid Take 5 mLs by mouth every 6 (six) hours as needed (cough). 120 mL 0    [DISCONTINUED] levoFLOXacin (LEVAQUIN) 500 MG tablet Take 1 tablet (500 mg total) by mouth once daily. 7 tablet 0     Current Facility-Administered Medications for the 5/5/22 encounter (Office Visit) with Katia Zepeda NP   Medication Dose Route Frequency Provider Last Rate Last Admin    cefTRIAXone  "injection 1 g  1 g Intramuscular 1 time in Clinic/HOD Katia Zepeda NP        dexamethasone injection 4 mg  4 mg Intramuscular 1 time in Clinic/HOD Katia Zepeda NP           Allergies  Review of patient's allergies indicates:   Allergen Reactions    Atorvastatin      LIPITOR    Levsin [hyoscyamine sulfate]     Nuts [tree nut]      BRAZIL NUTS    Pravastatin     Sulfa (sulfonamide antibiotics)        Physical Examination     Vitals:    05/05/22 1552   BP: 132/80   BP Location: Left arm   Patient Position: Sitting   BP Method: Medium (Manual)   Pulse: 70   Resp: 18   Temp: 98.2 °F (36.8 °C)   TempSrc: Oral   SpO2: 99%   Weight: 105.4 kg (232 lb 6 oz)   Height: 5' 5" (1.651 m)      Physical Exam  Constitutional:       Appearance: Normal appearance.   HENT:      Head: Normocephalic.      Right Ear: Tympanic membrane, ear canal and external ear normal.      Left Ear: Tympanic membrane, ear canal and external ear normal.      Nose: Congestion present.      Comments: Mod amt thick yellow nasal secretion, pressure over max region     Mouth/Throat:      Mouth: Mucous membranes are moist.      Pharynx: Oropharynx is clear. Posterior oropharyngeal erythema (mild erythema post pharynx) present.   Eyes:      Extraocular Movements: Extraocular movements intact.      Conjunctiva/sclera: Conjunctivae normal.      Pupils: Pupils are equal, round, and reactive to light.   Neck:      Comments: Sylvain ant cervical nodes  Cardiovascular:      Rate and Rhythm: Normal rate and regular rhythm.      Pulses: Normal pulses.      Heart sounds: Normal heart sounds.   Pulmonary:      Effort: Pulmonary effort is normal.      Breath sounds: Normal breath sounds.   Abdominal:      General: Bowel sounds are normal.      Palpations: Abdomen is soft.   Musculoskeletal:         General: Normal range of motion.      Cervical back: Normal range of motion.   Lymphadenopathy:      Cervical: Cervical adenopathy present.   Skin:     General: Skin is " warm and dry.      Capillary Refill: Capillary refill takes less than 2 seconds.   Neurological:      General: No focal deficit present.      Mental Status: She is alert and oriented to person, place, and time.   Psychiatric:         Mood and Affect: Mood normal.         Behavior: Behavior normal.          Assessment and Plan (including Health Maintenance)      Problem List  Smart Sets  Document Outside HM   :    Plan: meds as ordered, return to clinic as needed, avoid irritants,    Acute recurrent maxillary sinusitis  -     cefTRIAXone injection 1 g  -     dexamethasone injection 4 mg    Other orders  -     gqwfovoji-MK-YG-acetaminophen (DURAFLU) 31--325 mg Tab; Take 1 tablet by mouth every 4 (four) hours as needed (nasal congestion, cough).  Dispense: 32 tablet; Refill: 0  -     fluticasone propionate (FLONASE) 50 mcg/actuation nasal spray; 1 spray (50 mcg total) by Each Nostril route once daily.  Dispense: 15.8 mL; Refill: 0  -     doxycycline (MONODOX) 100 MG capsule; Take 1 capsule (100 mg total) by mouth every 12 (twelve) hours.  Dispense: 20 capsule; Refill: 0            Health Maintenance Due   Topic Date Due    Hepatitis C Screening  Never done    Pneumococcal Vaccines (Age 0-64) (1 - PCV) Never done    HIV Screening  Never done    TETANUS VACCINE  Never done    Shingles Vaccine (1 of 2) Never done       Problem List Items Addressed This Visit        ENT    Acute recurrent maxillary sinusitis - Primary    Relevant Medications    cefTRIAXone injection 1 g (Start on 5/9/2022 11:45 AM)    dexamethasone injection 4 mg (Start on 5/9/2022 11:45 AM)            Health Maintenance Topics with due status: Not Due       Topic Last Completion Date    Mammogram 07/23/2021    Lipid Panel 03/17/2022       Procedures     Future Appointments   Date Time Provider Department Center   5/10/2022  4:15 PM Katia Zepeda NP [facility] [place] [employer] [place]   5/12/2022  2:30 PM Muna Bojorquez OB AUDIO [facility]   5/12/2022   3:00 PM Shorty Schmitz MD [facility]   5/31/2022  1:20 PM [facility]   3/20/2023  9:00 AM Katia Zepeda NP [facility]        Follow up if symptoms worsen or fail to improve.       Signature:  Katia Zepeda NP    Date of encounter: 5/5/22

## 2022-05-09 PROBLEM — J01.01 ACUTE RECURRENT MAXILLARY SINUSITIS: Status: ACTIVE | Noted: 2022-05-09

## 2022-05-09 RX ORDER — DEXAMETHASONE SODIUM PHOSPHATE 4 MG/ML
4 INJECTION, SOLUTION INTRA-ARTICULAR; INTRALESIONAL; INTRAMUSCULAR; INTRAVENOUS; SOFT TISSUE
Status: DISCONTINUED | OUTPATIENT
Start: 2022-05-09 | End: 2022-07-26 | Stop reason: ALTCHOICE

## 2022-05-09 RX ORDER — CEFTRIAXONE 1 G/1
1 INJECTION, POWDER, FOR SOLUTION INTRAMUSCULAR; INTRAVENOUS
Status: DISCONTINUED | OUTPATIENT
Start: 2022-05-09 | End: 2022-07-26 | Stop reason: ALTCHOICE

## 2022-05-10 ENCOUNTER — TELEPHONE (OUTPATIENT)
Dept: FAMILY MEDICINE | Facility: CLINIC | Age: 52
End: 2022-05-10
Payer: COMMERCIAL

## 2022-05-10 NOTE — TELEPHONE ENCOUNTER
"Called pt to check on her condition , Pt states the magic mouthwash worked very well and her mouth feels much better.  Pt also states her "sinuses" seem to be clearing up as well.  Instructed pt if she needs anything, or has any questions/concerns to notify the office. Pt voiced understanding.  "

## 2022-05-12 ENCOUNTER — OFFICE VISIT (OUTPATIENT)
Dept: OTOLARYNGOLOGY | Facility: CLINIC | Age: 52
End: 2022-05-12
Payer: COMMERCIAL

## 2022-05-12 VITALS — HEIGHT: 65 IN | WEIGHT: 232 LBS | BODY MASS INDEX: 38.65 KG/M2

## 2022-05-12 DIAGNOSIS — J31.0 CHRONIC RHINITIS: Primary | ICD-10-CM

## 2022-05-12 DIAGNOSIS — R42 VERTIGO: ICD-10-CM

## 2022-05-12 DIAGNOSIS — K21.9 GASTROESOPHAGEAL REFLUX DISEASE WITHOUT ESOPHAGITIS: ICD-10-CM

## 2022-05-12 DIAGNOSIS — J34.2 DEVIATED NASAL SEPTUM: ICD-10-CM

## 2022-05-12 PROCEDURE — 4010F PR ACE/ARB THEARPY RXD/TAKEN: ICD-10-PCS | Mod: ,,, | Performed by: OTOLARYNGOLOGY

## 2022-05-12 PROCEDURE — 99204 OFFICE O/P NEW MOD 45 MIN: CPT | Mod: S$PBB,,, | Performed by: OTOLARYNGOLOGY

## 2022-05-12 PROCEDURE — 1159F PR MEDICATION LIST DOCUMENTED IN MEDICAL RECORD: ICD-10-PCS | Mod: ,,, | Performed by: OTOLARYNGOLOGY

## 2022-05-12 PROCEDURE — 1160F PR REVIEW ALL MEDS BY PRESCRIBER/CLIN PHARMACIST DOCUMENTED: ICD-10-PCS | Mod: ,,, | Performed by: OTOLARYNGOLOGY

## 2022-05-12 PROCEDURE — 3008F PR BODY MASS INDEX (BMI) DOCUMENTED: ICD-10-PCS | Mod: ,,, | Performed by: OTOLARYNGOLOGY

## 2022-05-12 PROCEDURE — 99213 OFFICE O/P EST LOW 20 MIN: CPT | Mod: PBBFAC | Performed by: OTOLARYNGOLOGY

## 2022-05-12 PROCEDURE — 4010F ACE/ARB THERAPY RXD/TAKEN: CPT | Mod: ,,, | Performed by: OTOLARYNGOLOGY

## 2022-05-12 PROCEDURE — 99204 PR OFFICE/OUTPT VISIT, NEW, LEVL IV, 45-59 MIN: ICD-10-PCS | Mod: S$PBB,,, | Performed by: OTOLARYNGOLOGY

## 2022-05-12 PROCEDURE — 1160F RVW MEDS BY RX/DR IN RCRD: CPT | Mod: ,,, | Performed by: OTOLARYNGOLOGY

## 2022-05-12 PROCEDURE — 1159F MED LIST DOCD IN RCRD: CPT | Mod: ,,, | Performed by: OTOLARYNGOLOGY

## 2022-05-12 PROCEDURE — 3008F BODY MASS INDEX DOCD: CPT | Mod: ,,, | Performed by: OTOLARYNGOLOGY

## 2022-05-12 NOTE — PROGRESS NOTES
Subjective:       Patient ID: Kirti Morris is a 51 y.o. female.    Chief Complaint: Dizziness (Vertigo. )  also allergy symptoms GERD nasal obstruction right takes Protonix on antibiotic now  HPI  Review of Systems   HENT: Positive for congestion, sinus pressure, sinus pain and trouble swallowing.    Neurological: Positive for dizziness.   All other systems reviewed and are negative.      Objective:      Physical Exam  General: NAD  Head: Normocephalic, atraumatic, no facial asymmetry/normal strength,  Ears: Both auricules normal in appearance, w/o deformities tympanic membranes normal external auditory canals normal  Nose: External nose w/o deformities normal turbinates no drainage or inflammation septum deviated   Oral Cavity: Lips, gums, floor of mouth, tongue hard palate, and buccal mucosa without mass/lesion  Oropharynx: Mucosa pink and moist, soft palate, posterior pharynx and oropharyngeal wall without mass/lesion  Neck: Supple, symmetric, trachea midline, no palpable mass/lesion, no palpable cervical lymphadenopathy  Skin: Warm and dry, no concerning lesions  Respiratory: Respirations even, unlabored    Assessment:       1. Chronic rhinitis    2. Vertigo    3. Deviated nasal septum    4. Gastroesophageal reflux disease without esophagitis        Plan:       PT therapy for dizziness  RAST for allergies      May also have GERD contributing to some of symptoms

## 2022-05-26 ENCOUNTER — OFFICE VISIT (OUTPATIENT)
Dept: FAMILY MEDICINE | Facility: CLINIC | Age: 52
End: 2022-05-26
Payer: COMMERCIAL

## 2022-05-26 VITALS
OXYGEN SATURATION: 99 % | HEIGHT: 65 IN | RESPIRATION RATE: 18 BRPM | TEMPERATURE: 98 F | HEART RATE: 90 BPM | SYSTOLIC BLOOD PRESSURE: 102 MMHG | WEIGHT: 232.5 LBS | DIASTOLIC BLOOD PRESSURE: 82 MMHG | BODY MASS INDEX: 38.74 KG/M2

## 2022-05-26 DIAGNOSIS — M25.572 PAIN AND SWELLING OF ANKLE, LEFT: ICD-10-CM

## 2022-05-26 DIAGNOSIS — R05.9 COUGH: ICD-10-CM

## 2022-05-26 DIAGNOSIS — M25.472 PAIN AND SWELLING OF ANKLE, LEFT: ICD-10-CM

## 2022-05-26 DIAGNOSIS — J01.01 ACUTE RECURRENT MAXILLARY SINUSITIS: Primary | ICD-10-CM

## 2022-05-26 PROCEDURE — 4010F PR ACE/ARB THEARPY RXD/TAKEN: ICD-10-PCS | Mod: ,,, | Performed by: NURSE PRACTITIONER

## 2022-05-26 PROCEDURE — 99214 OFFICE O/P EST MOD 30 MIN: CPT | Mod: 25,,, | Performed by: NURSE PRACTITIONER

## 2022-05-26 PROCEDURE — 4010F ACE/ARB THERAPY RXD/TAKEN: CPT | Mod: ,,, | Performed by: NURSE PRACTITIONER

## 2022-05-26 PROCEDURE — 96372 THER/PROPH/DIAG INJ SC/IM: CPT | Mod: ,,, | Performed by: NURSE PRACTITIONER

## 2022-05-26 PROCEDURE — 3008F BODY MASS INDEX DOCD: CPT | Mod: ,,, | Performed by: NURSE PRACTITIONER

## 2022-05-26 PROCEDURE — 1159F MED LIST DOCD IN RCRD: CPT | Mod: ,,, | Performed by: NURSE PRACTITIONER

## 2022-05-26 PROCEDURE — 99214 PR OFFICE/OUTPT VISIT, EST, LEVL IV, 30-39 MIN: ICD-10-PCS | Mod: 25,,, | Performed by: NURSE PRACTITIONER

## 2022-05-26 PROCEDURE — 3074F SYST BP LT 130 MM HG: CPT | Mod: ,,, | Performed by: NURSE PRACTITIONER

## 2022-05-26 PROCEDURE — 3079F DIAST BP 80-89 MM HG: CPT | Mod: ,,, | Performed by: NURSE PRACTITIONER

## 2022-05-26 PROCEDURE — 3008F PR BODY MASS INDEX (BMI) DOCUMENTED: ICD-10-PCS | Mod: ,,, | Performed by: NURSE PRACTITIONER

## 2022-05-26 PROCEDURE — 1159F PR MEDICATION LIST DOCUMENTED IN MEDICAL RECORD: ICD-10-PCS | Mod: ,,, | Performed by: NURSE PRACTITIONER

## 2022-05-26 PROCEDURE — 3079F PR MOST RECENT DIASTOLIC BLOOD PRESSURE 80-89 MM HG: ICD-10-PCS | Mod: ,,, | Performed by: NURSE PRACTITIONER

## 2022-05-26 PROCEDURE — 3074F PR MOST RECENT SYSTOLIC BLOOD PRESSURE < 130 MM HG: ICD-10-PCS | Mod: ,,, | Performed by: NURSE PRACTITIONER

## 2022-05-26 PROCEDURE — 96372 PR INJECTION,THERAP/PROPH/DIAG2ST, IM OR SUBCUT: ICD-10-PCS | Mod: ,,, | Performed by: NURSE PRACTITIONER

## 2022-05-26 RX ORDER — LINCOMYCIN HYDROCHLORIDE 300 MG/ML
600 INJECTION, SOLUTION INTRAMUSCULAR; INTRAVENOUS; SUBCONJUNCTIVAL
Status: COMPLETED | OUTPATIENT
Start: 2022-05-26 | End: 2022-05-26

## 2022-05-26 RX ORDER — DOXYCYCLINE 100 MG/1
100 CAPSULE ORAL 2 TIMES DAILY
Qty: 14 CAPSULE | Refills: 0 | Status: SHIPPED | OUTPATIENT
Start: 2022-05-26 | End: 2022-08-04 | Stop reason: ALTCHOICE

## 2022-05-26 RX ADMIN — LINCOMYCIN HYDROCHLORIDE 600 MG: 300 INJECTION, SOLUTION INTRAMUSCULAR; INTRAVENOUS; SUBCONJUNCTIVAL at 09:05

## 2022-05-26 NOTE — PROGRESS NOTES
Katia Zepeda NP   [facility]  [address]  [place]     PATIENT NAME: Kirti Morris  : 1970  DATE: 22  MRN: [id number]      Billing Provider: Katia Zepeda NP  Level of Service:   Patient PCP Information     Provider PCP Type    Katia Zepeda NP General          Reason for Visit / Chief Complaint: Chest Congestion, Nasal Congestion (Yellow thick phlegm ), and Ankle Pain (Left ankle pain x 3 weeks. )       Update PCP  Update Chief Complaint         History of Present Illness / Problem Focused Workflow     Kirti Morris presents to the clinic chest congestion, nasal congestion, yellow thick phlegm and ankle pain, left ankle pain x 3 weeks      Review of Systems     Review of Systems   Constitutional: Negative for chills, fatigue and fever.   HENT: Negative for nasal congestion, ear pain, facial swelling, hearing loss, mouth dryness, mouth sores, postnasal drip, rhinorrhea, sinus pressure/congestion and goiter.    Eyes: Negative for discharge and itching.   Respiratory: Positive for cough. Negative for shortness of breath and wheezing.    Cardiovascular: Negative for chest pain and leg swelling.   Gastrointestinal: Negative for abdominal pain, change in bowel habit and change in bowel habit.   Genitourinary: Negative for difficulty urinating, dysuria, enuresis, frequency, hematuria and urgency.   Musculoskeletal:        Pain lat ankle, left , feels pain go through her ankle occ, thinks she may have a hairline fx.   Neurological: Negative for dizziness, vertigo, syncope, weakness and headaches.   Psychiatric/Behavioral: Negative for decreased concentration.        Medical / Social / Family History     Past Medical History:   Diagnosis Date    Angina at rest 2021    Anxiety     Arthritis     Asthma     Cerebral infarction 2021    Combined B12 and folate deficiency anemia 2020    COPD (chronic obstructive pulmonary disease)     Coronary artery disease     acute  MI 02/2014    CRF (chronic renal failure)     GERD (gastroesophageal reflux disease)     Hyperlipidemia     Hypertension     Myocardial infarction     Nonrheumatic mitral (valve) insufficiency 05/21/2018    Sleep apnea 01/06/2020    Stroke     Vitamin D deficiency 07/31/2020       Past Surgical History:   Procedure Laterality Date    CORONARY ANGIOPLASTY WITH STENT PLACEMENT  02/07/2014    HYSTERECTOMY      RADIOFREQUENCY ABLATION Right 10/13/2021    Procedure: RADIOFREQUENCY ABLATION;  Surgeon: David Combs MD;  Location: [facility];  Service: Pain Management;  Laterality: Right;  Right SI RFTC       Social History  Ms.  reports that she has been smoking cigarettes. She has been smoking about 1.00 pack per day. She has never used smokeless tobacco. She reports current alcohol use. She reports that she does not use drugs.    Family History  Ms.'s family history includes Arthritis in her mother; Ovarian cancer in her maternal aunt.    Medications and Allergies     Medications  Outpatient Medications Marked as Taking for the 5/26/22 encounter (Office Visit) with Katia Zepeda NP   Medication Sig Dispense Refill    albuterol (VENTOLIN HFA) 90 mcg/actuation inhaler Inhale 2 puffs into the lungs 2 (two) times daily as needed (SOB). 2 puffs BID AS NEEDED. Not to exceed 4 times per day. 18 g 0    amLODIPine (NORVASC) 10 MG tablet Take 1 tablet (10 mg total) by mouth once daily. 30 tablet 3    ascorbic acid, vitamin C, (VITAMIN C) 500 MG tablet 500 mg.      aspirin (ECOTRIN) 81 MG EC tablet Take 81 mg by mouth once daily.      budesonide-formoterol 160-4.5 mcg (SYMBICORT) 160-4.5 mcg/actuation HFAA Inhale 2 puffs into the lungs every 12 (twelve) hours. Controller - USES PRN. 10.2 g 3    cetirizine (ZYRTEC) 10 MG tablet Take 1 tablet (10 mg total) by mouth once daily. 30 tablet 3    cholecalciferol, vitamin D3, (VITAMIN D3) 250 mcg (10,000 unit) Cap TAKE ONE CAPSULE BY MOUTH ONE TIME  DAILY 90 capsule 0    clopidogreL (PLAVIX) 75 mg tablet Take 1 tablet (75 mg total) by mouth once daily. 30 tablet 3    cyclobenzaprine (FLEXERIL) 10 MG tablet Take 1 tablet (10 mg total) by mouth 3 (three) times daily as needed for Muscle spasms. ONLY TAKES PRN 60 tablet 0    famotidine (PEPCID) 20 MG tablet Take 20 mg by mouth Daily.      fluticasone propionate (FLONASE) 50 mcg/actuation nasal spray 1 spray (50 mcg total) by Each Nostril route once daily. 15 g 0    gabapentin (NEURONTIN) 600 MG tablet Take 1 tablet (600 mg total) by mouth 3 (three) times daily. 90 tablet 3    HYDROcodone-acetaminophen (NORCO) 7.5-325 mg per tablet Take 1 tablet by mouth 2 (two) times daily as needed.      ibuprofen (ADVIL,MOTRIN) 600 MG tablet Take 600 mg by mouth every 8 (eight) hours as needed for Pain.      labetaloL (NORMODYNE) 200 MG tablet Take 1 tablet (200 mg total) by mouth 2 (two) times daily. 60 tablet 3    losartan (COZAAR) 50 MG tablet Take 1 tablet (50 mg total) by mouth once daily. 30 tablet 3    multivitamin/iron/folic acid (CENTRUM COMPLETE ORAL) Take by mouth Daily.      mv-mn/FA/bl coh/isoflav/jujube (ESTROVEN MENOPAUSE ORAL) Take by mouth once daily.      nitroGLYCERIN (NITROSTAT) 0.4 MG SL tablet Place 0.4 mg under the tongue every 5 (five) minutes as needed.      pantoprazole (PROTONIX) 40 MG tablet TAKE ONE Tablet BY MOUTH TWICE DAILY 60 tablet 3    REPATHA SURECLICK 140 mg/mL PnIj Inject 140 mg into the skin every 14 (fourteen) days.      zinc gluconate 50 mg tablet Take 50 mg by mouth once daily. Takes as needed       Current Facility-Administered Medications for the 5/26/22 encounter (Office Visit) with Katia Zepeda NP   Medication Dose Route Frequency Provider Last Rate Last Admin    cefTRIAXone injection 1 g  1 g Intramuscular 1 time in Clinic/HOD Katia Zepeda NP        dexamethasone injection 4 mg  4 mg Intramuscular 1 time in Clinic/HOD Katia Zepeda NP        [COMPLETED]  "lincomycin injection 600 mg  600 mg Intramuscular 1 time in Clinic/HOD Katia Zepeda NP   600 mg at 05/26/22 0938       Allergies  Review of patient's allergies indicates:   Allergen Reactions    Atorvastatin      LIPITOR    Levsin [hyoscyamine sulfate]     Nuts [tree nut]      BRAZIL NUTS    Pravastatin     Sulfa (sulfonamide antibiotics)        Physical Examination     Vitals:    05/26/22 0841   BP: 102/82   BP Location: Right arm   Patient Position: Sitting   BP Method: Medium (Manual)   Pulse: 90   Resp: 18   Temp: 97.8 °F (36.6 °C)   TempSrc: Oral   SpO2: 99%   Weight: 105.5 kg (232 lb 8 oz)   Height: 5' 5" (1.651 m)      Physical Exam  Vitals and nursing note reviewed.   Constitutional:       Appearance: Normal appearance.   HENT:      Head: Normocephalic.      Right Ear: Tympanic membrane, ear canal and external ear normal.      Left Ear: Tympanic membrane, ear canal and external ear normal.      Nose: Nose normal.      Mouth/Throat:      Mouth: Mucous membranes are moist.      Pharynx: Oropharynx is clear.   Eyes:      Extraocular Movements: Extraocular movements intact.      Conjunctiva/sclera: Conjunctivae normal.      Pupils: Pupils are equal, round, and reactive to light.   Cardiovascular:      Rate and Rhythm: Normal rate and regular rhythm.      Pulses: Normal pulses.      Heart sounds: Normal heart sounds.   Pulmonary:      Effort: Pulmonary effort is normal.      Breath sounds: Normal breath sounds.   Abdominal:      General: Bowel sounds are normal.      Palpations: Abdomen is soft.   Musculoskeletal:         General: Tenderness (mild tenderness lat area of left ankle) present. Normal range of motion.   Skin:     General: Skin is warm and dry.      Capillary Refill: Capillary refill takes less than 2 seconds.   Neurological:      General: No focal deficit present.      Mental Status: She is alert and oriented to person, place, and time.   Psychiatric:         Mood and Affect: Mood normal.    "      Behavior: Behavior normal.          Assessment and Plan (including Health Maintenance)      Problem List  Smart Sets  Document Outside HM   :    Plan:Elevate, may alternate heat and cold to ankle several times per day, if not improved in 2 weeks, return to clinic for reeval with xray, take tyleno or motrin every 4-6 hours prn pain    Avoid irritants, meds as ordered, return [facility] as needed and as scheudled     Acute recurrent maxillary sinusitis    Pain and swelling of ankle, left  -     X-Ray Ankle 2 View Left; Future; Expected date: 05/26/2022    Cough  -     X-Ray Chest PA And Lateral; Future; Expected date: 05/26/2022    Other orders  -     lincomycin injection 600 mg  -     doxycycline (MONODOX) 100 MG capsule; Take 1 capsule (100 mg total) by mouth 2 (two) times daily.  Dispense: 14 capsule; Refill: 0            Health Maintenance Due   Topic Date Due    Hepatitis C Screening  Never done    Pneumococcal Vaccines (Age 0-64) (1 - PCV) Never done    HIV Screening  Never done    TETANUS VACCINE  Never done    Shingles Vaccine (1 of 2) Never done    Mammogram  07/23/2022       Problem List Items Addressed This Visit        ENT    Acute recurrent maxillary sinusitis - Primary       Pulmonary    Cough    Relevant Orders    X-Ray Chest PA And Lateral (Completed)       Orthopedic    Pain and swelling of ankle, left    Relevant Orders    X-Ray Ankle 2 View Left            Health Maintenance Topics with due status: Not Due       Topic Last Completion Date    Lipid Panel 03/17/2022       Procedures     Future Appointments   Date Time Provider Department Center   5/31/2022  1:20 PM [facility]   6/6/2022  5:00 PM [facility]   6/9/2022  9:15 AM Katia Zepeda NP [facility] [place]   3/20/2023  9:00 AM Katia Zepeda NP [facility] [place]        Follow up in about 2 weeks (around 6/9/2022).       Signature:  Katia Zepeda  NP    Date of encounter: 5/26/22

## 2022-05-27 ENCOUNTER — OFFICE VISIT (OUTPATIENT)
Dept: FAMILY MEDICINE | Facility: CLINIC | Age: 52
End: 2022-05-27
Payer: COMMERCIAL

## 2022-05-27 VITALS
HEIGHT: 65 IN | WEIGHT: 232.5 LBS | SYSTOLIC BLOOD PRESSURE: 118 MMHG | DIASTOLIC BLOOD PRESSURE: 70 MMHG | HEART RATE: 88 BPM | BODY MASS INDEX: 38.74 KG/M2 | TEMPERATURE: 98 F | OXYGEN SATURATION: 98 % | RESPIRATION RATE: 20 BRPM

## 2022-05-27 DIAGNOSIS — M25.552 LEFT HIP PAIN: Primary | ICD-10-CM

## 2022-05-27 PROCEDURE — 4010F PR ACE/ARB THEARPY RXD/TAKEN: ICD-10-PCS | Mod: ,,, | Performed by: NURSE PRACTITIONER

## 2022-05-27 PROCEDURE — 3008F BODY MASS INDEX DOCD: CPT | Mod: ,,, | Performed by: NURSE PRACTITIONER

## 2022-05-27 PROCEDURE — 3074F SYST BP LT 130 MM HG: CPT | Mod: ,,, | Performed by: NURSE PRACTITIONER

## 2022-05-27 PROCEDURE — 4010F ACE/ARB THERAPY RXD/TAKEN: CPT | Mod: ,,, | Performed by: NURSE PRACTITIONER

## 2022-05-27 PROCEDURE — 99214 PR OFFICE/OUTPT VISIT, EST, LEVL IV, 30-39 MIN: ICD-10-PCS | Mod: ,,, | Performed by: NURSE PRACTITIONER

## 2022-05-27 PROCEDURE — 1159F MED LIST DOCD IN RCRD: CPT | Mod: ,,, | Performed by: NURSE PRACTITIONER

## 2022-05-27 PROCEDURE — 99214 OFFICE O/P EST MOD 30 MIN: CPT | Mod: ,,, | Performed by: NURSE PRACTITIONER

## 2022-05-27 PROCEDURE — 3078F PR MOST RECENT DIASTOLIC BLOOD PRESSURE < 80 MM HG: ICD-10-PCS | Mod: ,,, | Performed by: NURSE PRACTITIONER

## 2022-05-27 PROCEDURE — 1159F PR MEDICATION LIST DOCUMENTED IN MEDICAL RECORD: ICD-10-PCS | Mod: ,,, | Performed by: NURSE PRACTITIONER

## 2022-05-27 PROCEDURE — 3008F PR BODY MASS INDEX (BMI) DOCUMENTED: ICD-10-PCS | Mod: ,,, | Performed by: NURSE PRACTITIONER

## 2022-05-27 PROCEDURE — 3078F DIAST BP <80 MM HG: CPT | Mod: ,,, | Performed by: NURSE PRACTITIONER

## 2022-05-27 PROCEDURE — 3074F PR MOST RECENT SYSTOLIC BLOOD PRESSURE < 130 MM HG: ICD-10-PCS | Mod: ,,, | Performed by: NURSE PRACTITIONER

## 2022-05-27 NOTE — PROGRESS NOTES
Katia Zepeda NP   [facility]  [address]  [place]     PATIENT NAME: Kirti Morris  : 1970  DATE: 22  MRN: [id number]      Billing Provider: Katia Zepeda NP  Level of Service:   Patient PCP Information     Provider PCP Type    Katia Zepeda NP General          Reason for Visit / Chief Complaint: Hip Pain (Pt states she is having pain in the right hip.  States she got an injection of lincomycin yesterday.  States her hip started hurting last night.   )       Update PCP  Update Chief Complaint         History of Present Illness / Problem Focused Workflow     Kirti Morris presents to the clinic c/o hip pain from injection site yesterday, pain of left hip. started hurting       Review of Systems     Review of Systems   Constitutional: Negative for chills, fatigue and fever.   HENT: Negative for nasal congestion, ear pain, facial swelling, hearing loss, mouth dryness, mouth sores, postnasal drip, rhinorrhea, sinus pressure/congestion and goiter.    Eyes: Negative for discharge and itching.   Respiratory: Negative for cough, shortness of breath and wheezing.    Cardiovascular: Negative for chest pain and leg swelling.   Gastrointestinal: Negative for abdominal pain, change in bowel habit and change in bowel habit.   Genitourinary: Negative for difficulty urinating, dysuria, enuresis, frequency, hematuria and urgency.   Musculoskeletal:        Pain of left hip at injection site   Neurological: Negative for dizziness, vertigo, syncope, weakness and headaches.   Psychiatric/Behavioral: Negative for decreased concentration.        Medical / Social / Family History     Past Medical History:   Diagnosis Date    Angina at rest 2021    Anxiety     Arthritis     Asthma     Cerebral infarction 2021    Combined B12 and folate deficiency anemia 2020    COPD (chronic obstructive pulmonary disease)     Coronary artery disease     acute MI 2014    CRF (chronic renal  failure)     GERD (gastroesophageal reflux disease)     Hyperlipidemia     Hypertension     Myocardial infarction     Nonrheumatic mitral (valve) insufficiency 05/21/2018    Sleep apnea 01/06/2020    Stroke     Vitamin D deficiency 07/31/2020       Past Surgical History:   Procedure Laterality Date    CORONARY ANGIOPLASTY WITH STENT PLACEMENT  02/07/2014    HYSTERECTOMY      RADIOFREQUENCY ABLATION Right 10/13/2021    Procedure: RADIOFREQUENCY ABLATION;  Surgeon: David Combs MD;  Location: [facility];  Service: Pain Management;  Laterality: Right;  Right SI RFTC       Social History  Ms.  reports that she has been smoking cigarettes. She has been smoking about 1.00 pack per day. She has never used smokeless tobacco. She reports current alcohol use. She reports that she does not use drugs.    Family History  Ms.'s family history includes Arthritis in her mother; Ovarian cancer in her maternal aunt.    Medications and Allergies     Medications  Outpatient Medications Marked as Taking for the 5/27/22 encounter (Office Visit) with Katia Zepeda NP   Medication Sig Dispense Refill    albuterol (VENTOLIN HFA) 90 mcg/actuation inhaler Inhale 2 puffs into the lungs 2 (two) times daily as needed (SOB). 2 puffs BID AS NEEDED. Not to exceed 4 times per day. 18 g 0    amLODIPine (NORVASC) 10 MG tablet Take 1 tablet (10 mg total) by mouth once daily. 30 tablet 3    ascorbic acid, vitamin C, (VITAMIN C) 500 MG tablet 500 mg.      aspirin (ECOTRIN) 81 MG EC tablet Take 81 mg by mouth once daily.      budesonide-formoterol 160-4.5 mcg (SYMBICORT) 160-4.5 mcg/actuation HFAA Inhale 2 puffs into the lungs every 12 (twelve) hours. Controller - USES PRN. 10.2 g 3    cetirizine (ZYRTEC) 10 MG tablet Take 1 tablet (10 mg total) by mouth once daily. 30 tablet 3    cholecalciferol, vitamin D3, (VITAMIN D3) 250 mcg (10,000 unit) Cap TAKE ONE CAPSULE BY MOUTH ONE TIME DAILY 90 capsule 0    clopidogreL  (PLAVIX) 75 mg tablet Take 1 tablet (75 mg total) by mouth once daily. 30 tablet 3    cyclobenzaprine (FLEXERIL) 10 MG tablet Take 1 tablet (10 mg total) by mouth 3 (three) times daily as needed for Muscle spasms. ONLY TAKES PRN 60 tablet 0    doxycycline (MONODOX) 100 MG capsule Take 1 capsule (100 mg total) by mouth 2 (two) times daily. 14 capsule 0    famotidine (PEPCID) 20 MG tablet Take 20 mg by mouth Daily.      fluticasone propionate (FLONASE) 50 mcg/actuation nasal spray 1 spray (50 mcg total) by Each Nostril route once daily. 15 g 0    gabapentin (NEURONTIN) 600 MG tablet Take 1 tablet (600 mg total) by mouth 3 (three) times daily. 90 tablet 3    HYDROcodone-acetaminophen (NORCO) 7.5-325 mg per tablet Take 1 tablet by mouth 2 (two) times daily as needed.      ibuprofen (ADVIL,MOTRIN) 600 MG tablet Take 600 mg by mouth every 8 (eight) hours as needed for Pain.      labetaloL (NORMODYNE) 200 MG tablet Take 1 tablet (200 mg total) by mouth 2 (two) times daily. 60 tablet 3    losartan (COZAAR) 50 MG tablet Take 1 tablet (50 mg total) by mouth once daily. 30 tablet 3    multivitamin/iron/folic acid (CENTRUM COMPLETE ORAL) Take by mouth Daily.      mv-mn/FA/bl coh/isoflav/jujube (ESTROVEN MENOPAUSE ORAL) Take by mouth once daily.      nitroGLYCERIN (NITROSTAT) 0.4 MG SL tablet Place 0.4 mg under the tongue every 5 (five) minutes as needed.      pantoprazole (PROTONIX) 40 MG tablet TAKE ONE Tablet BY MOUTH TWICE DAILY 60 tablet 3    REPATHA SURECLICK 140 mg/mL PnIj Inject 140 mg into the skin every 14 (fourteen) days.      zinc gluconate 50 mg tablet Take 50 mg by mouth once daily. Takes as needed       Current Facility-Administered Medications for the 5/27/22 encounter (Office Visit) with Katia Zepeda NP   Medication Dose Route Frequency Provider Last Rate Last Admin    cefTRIAXone injection 1 g  1 g Intramuscular 1 time in Clinic/HOD Katia Zepeda NP        dexamethasone injection 4 mg  4  "mg Intramuscular 1 time in Clinic/HOD Katia Zepeda NP           Allergies  Review of patient's allergies indicates:   Allergen Reactions    Atorvastatin      LIPITOR    Levsin [hyoscyamine sulfate]     Nuts [tree nut]      BRAZIL NUTS    Pravastatin     Sulfa (sulfonamide antibiotics)        Physical Examination     Vitals:    05/27/22 1048   BP: 118/70   BP Location: Left arm   Patient Position: Sitting   BP Method: Medium (Manual)   Pulse: 88   Resp: 20   Temp: 98.2 °F (36.8 °C)   TempSrc: Oral   SpO2: 98%   Weight: 105.5 kg (232 lb 8 oz)   Height: 5' 5" (1.651 m)      Physical Exam  Vitals and nursing note reviewed.   Constitutional:       Appearance: Normal appearance.   HENT:      Head: Normocephalic.      Right Ear: Tympanic membrane, ear canal and external ear normal.      Left Ear: Tympanic membrane, ear canal and external ear normal.      Nose: Nose normal.      Mouth/Throat:      Mouth: Mucous membranes are moist.      Pharynx: Oropharynx is clear.   Eyes:      Extraocular Movements: Extraocular movements intact.      Conjunctiva/sclera: Conjunctivae normal.      Pupils: Pupils are equal, round, and reactive to light.   Cardiovascular:      Rate and Rhythm: Normal rate and regular rhythm.      Pulses: Normal pulses.      Heart sounds: Normal heart sounds.   Pulmonary:      Effort: Pulmonary effort is normal.      Breath sounds: Normal breath sounds.   Abdominal:      General: Bowel sounds are normal.      Palpations: Abdomen is soft.   Musculoskeletal:         General: Normal range of motion.      Cervical back: Normal range of motion and neck supple.   Skin:     General: Skin is warm and dry.      Capillary Refill: Capillary refill takes less than 2 seconds.      Comments: No erythema noted , mild tenderness on upper quad of buttocks, unable to tell when injection was given.    Neurological:      General: No focal deficit present.      Mental Status: She is alert and oriented to person, place, and " time.   Psychiatric:         Mood and Affect: Mood normal.         Behavior: Behavior normal.          Assessment and Plan (including Health Maintenance)      Problem List  Smart Sets  Document Outside HM   :    Plan: apply warm compresses  And cool compresses to hip several times per day for comfort, return to clinic as needed, return to clinic Monday if needed.      There are no diagnoses linked to this encounter.        Health Maintenance Due   Topic Date Due    Hepatitis C Screening  Never done    Pneumococcal Vaccines (Age 0-64) (1 - PCV) Never done    HIV Screening  Never done    TETANUS VACCINE  Never done    Shingles Vaccine (1 of 2) Never done    Mammogram  07/23/2022       Problem List Items Addressed This Visit    None           Health Maintenance Topics with due status: Not Due       Topic Last Completion Date    Lipid Panel 03/17/2022       Procedures     Future Appointments   Date Time Provider Department Center   5/31/2022  1:20 PM Aquiles Alcantara MD [facility]   6/6/2022  5:00 PM [facility]   6/9/2022  9:15 AM Katia Zepeda NP [facility]   3/20/2023  9:00 AM Katia Zepeda NP [facility]        No follow-ups on file.       Signature:  Katia Zepeda NP    Date of encounter: 5/27/22

## 2022-05-31 ENCOUNTER — OFFICE VISIT (OUTPATIENT)
Dept: PULMONOLOGY | Facility: CLINIC | Age: 52
End: 2022-05-31
Payer: COMMERCIAL

## 2022-05-31 VITALS
SYSTOLIC BLOOD PRESSURE: 120 MMHG | DIASTOLIC BLOOD PRESSURE: 88 MMHG | RESPIRATION RATE: 18 BRPM | OXYGEN SATURATION: 97 % | TEMPERATURE: 99 F | HEART RATE: 90 BPM

## 2022-05-31 DIAGNOSIS — J44.9 CHRONIC OBSTRUCTIVE PULMONARY DISEASE, UNSPECIFIED COPD TYPE: ICD-10-CM

## 2022-05-31 DIAGNOSIS — J40 BRONCHITIS: ICD-10-CM

## 2022-05-31 PROCEDURE — 3079F PR MOST RECENT DIASTOLIC BLOOD PRESSURE 80-89 MM HG: ICD-10-PCS | Mod: ,,, | Performed by: INTERNAL MEDICINE

## 2022-05-31 PROCEDURE — 4010F PR ACE/ARB THEARPY RXD/TAKEN: ICD-10-PCS | Mod: ,,, | Performed by: INTERNAL MEDICINE

## 2022-05-31 PROCEDURE — 3079F DIAST BP 80-89 MM HG: CPT | Mod: ,,, | Performed by: INTERNAL MEDICINE

## 2022-05-31 PROCEDURE — 1159F MED LIST DOCD IN RCRD: CPT | Mod: ,,, | Performed by: INTERNAL MEDICINE

## 2022-05-31 PROCEDURE — 1159F PR MEDICATION LIST DOCUMENTED IN MEDICAL RECORD: ICD-10-PCS | Mod: ,,, | Performed by: INTERNAL MEDICINE

## 2022-05-31 PROCEDURE — 99203 PR OFFICE/OUTPT VISIT, NEW, LEVL III, 30-44 MIN: ICD-10-PCS | Mod: ,,, | Performed by: INTERNAL MEDICINE

## 2022-05-31 PROCEDURE — 4010F ACE/ARB THERAPY RXD/TAKEN: CPT | Mod: ,,, | Performed by: INTERNAL MEDICINE

## 2022-05-31 PROCEDURE — 3074F SYST BP LT 130 MM HG: CPT | Mod: ,,, | Performed by: INTERNAL MEDICINE

## 2022-05-31 PROCEDURE — 3074F PR MOST RECENT SYSTOLIC BLOOD PRESSURE < 130 MM HG: ICD-10-PCS | Mod: ,,, | Performed by: INTERNAL MEDICINE

## 2022-05-31 PROCEDURE — 99203 OFFICE O/P NEW LOW 30 MIN: CPT | Mod: ,,, | Performed by: INTERNAL MEDICINE

## 2022-05-31 RX ORDER — FLUCONAZOLE 150 MG/1
150 TABLET ORAL ONCE
COMMUNITY
Start: 2022-05-03 | End: 2022-07-26 | Stop reason: ALTCHOICE

## 2022-05-31 RX ORDER — EZETIMIBE 10 MG/1
10 TABLET ORAL DAILY
COMMUNITY
Start: 2022-05-13

## 2022-05-31 RX ORDER — AZITHROMYCIN 250 MG/1
TABLET, FILM COATED ORAL
COMMUNITY
Start: 2022-05-26 | End: 2022-07-26 | Stop reason: ALTCHOICE

## 2022-05-31 NOTE — PROGRESS NOTES
Subjective:       Patient ID: Kirti Morris is a 51 y.o. female.    Chief Complaint: COPD (Referral by JEMAL Finney. Was told she has an infection in her lung and COPD. Been on antibiotics for over 1.5 months. Seen by allergy doctor. Stated she has history of a previous stroke and cannot remember things but stated she has been told in the past that she does not have COPD. Stated she had three rounds of antibiotics. )    COPD  This is a chronic problem. The current episode started more than 1 year ago. The problem occurs daily. The problem has been unchanged. Pertinent negatives include no abdominal pain, arthralgias, chest pain, chills, congestion, headaches or rash. The symptoms are aggravated by exertion, smoking and eating. She has tried nothing for the symptoms.     Past Medical History:   Diagnosis Date    Angina at rest 06/02/2021    Anxiety     Arthritis     Asthma     Cerebral infarction 06/02/2021    Combined B12 and folate deficiency anemia 01/08/2020    COPD (chronic obstructive pulmonary disease)     Coronary artery disease     acute MI 02/2014    CRF (chronic renal failure)     GERD (gastroesophageal reflux disease)     Hyperlipidemia     Hypertension     Myocardial infarction     Nonrheumatic mitral (valve) insufficiency 05/21/2018    Sleep apnea 01/06/2020    Stroke     Vitamin D deficiency 07/31/2020     Past Surgical History:   Procedure Laterality Date    CORONARY ANGIOPLASTY WITH STENT PLACEMENT  02/07/2014    HYSTERECTOMY      RADIOFREQUENCY ABLATION Right 10/13/2021    Procedure: RADIOFREQUENCY ABLATION;  Surgeon: David Combs MD;  Location: [facility];  Service: Pain Management;  Laterality: Right;  Right SI RFTC     Family History   Problem Relation Age of Onset    Ovarian cancer Maternal Aunt     Arthritis Mother      Review of patient's allergies indicates:   Allergen Reactions    Atorvastatin      LIPITOR    Levsin [hyoscyamine sulfate]      Nuts [tree nut]      BRAZIL NUTS    Pravastatin     Sulfa (sulfonamide antibiotics)       Social History     Tobacco Use    Smoking status: Current Every Day Smoker     Packs/day: 1.00     Types: Cigarettes    Smokeless tobacco: Never Used    Tobacco comment: Smoker since age 16. Never had a CT of chest   Substance Use Topics    Alcohol use: Yes     Comment: social    Drug use: Never      Review of Systems   Constitutional: Negative for chills, activity change and night sweats.   HENT: Negative for congestion and ear pain.    Eyes: Negative for redness and itching.   Cardiovascular: Negative for chest pain and palpitations.   Musculoskeletal: Negative for arthralgias and back pain.   Skin: Negative for rash.   Gastrointestinal: Negative for abdominal pain and abdominal distention.   Neurological: Negative for dizziness and headaches.   Hematological: Negative for adenopathy. Does not bruise/bleed easily.   Psychiatric/Behavioral: Negative for confusion. The patient is not nervous/anxious.        Objective:      Physical Exam   Constitutional: She is oriented to person, place, and time. She appears well-developed and well-nourished.   HENT:   Head: Normocephalic.   Nose: Nose normal.   Mouth/Throat: Oropharynx is clear and moist.   Neck: No JVD present. No thyromegaly present.   Cardiovascular: Normal rate, regular rhythm, normal heart sounds and intact distal pulses.   Pulmonary/Chest: Normal expansion, hyperinflation, symmetric chest wall expansion, effort normal and breath sounds normal.   Abdominal: Soft. Bowel sounds are normal.   Musculoskeletal:         General: Normal range of motion.      Cervical back: Normal range of motion and neck supple.   Lymphadenopathy: No supraclavicular adenopathy is present.     She has no cervical adenopathy.   Neurological: She is alert and oriented to person, place, and time. She has normal reflexes.   Skin: Skin is warm and dry.   Psychiatric: She has a normal mood and  affect. Her behavior is normal.     Personal Diagnostic Review  none pertinent    No flowsheet data found.      Assessment:       1. Chronic obstructive pulmonary disease, unspecified COPD type    2. Bronchitis        Outpatient Encounter Medications as of 5/31/2022   Medication Sig Dispense Refill    albuterol (VENTOLIN HFA) 90 mcg/actuation inhaler Inhale 2 puffs into the lungs 2 (two) times daily as needed (SOB). 2 puffs BID AS NEEDED. Not to exceed 4 times per day. 18 g 0    amLODIPine (NORVASC) 10 MG tablet Take 1 tablet (10 mg total) by mouth once daily. 30 tablet 3    ascorbic acid, vitamin C, (VITAMIN C) 500 MG tablet 500 mg.      aspirin (ECOTRIN) 81 MG EC tablet Take 81 mg by mouth once daily.      azithromycin (Z-PADMINI) 250 MG tablet TAKE 2 TABLETS BY MOUTH ON DAY 1, THEN TAKE 1 TABLET DAILY ON DAYS 2-5      budesonide-formoterol 160-4.5 mcg (SYMBICORT) 160-4.5 mcg/actuation HFAA Inhale 2 puffs into the lungs every 12 (twelve) hours. Controller - USES PRN. 10.2 g 3    cetirizine (ZYRTEC) 10 MG tablet Take 1 tablet (10 mg total) by mouth once daily. 30 tablet 3    cholecalciferol, vitamin D3, (VITAMIN D3) 250 mcg (10,000 unit) Cap TAKE ONE CAPSULE BY MOUTH ONE TIME DAILY 90 capsule 0    fluconazole (DIFLUCAN) 150 MG Tab Take 150 mg by mouth once.      fluticasone propionate (FLONASE) 50 mcg/actuation nasal spray 1 spray (50 mcg total) by Each Nostril route once daily. 15 g 0    gabapentin (NEURONTIN) 600 MG tablet Take 1 tablet (600 mg total) by mouth 3 (three) times daily. 90 tablet 3    HYDROcodone-acetaminophen (NORCO) 7.5-325 mg per tablet Take 1 tablet by mouth 2 (two) times daily as needed.      ibuprofen (ADVIL,MOTRIN) 600 MG tablet Take 600 mg by mouth every 8 (eight) hours as needed for Pain.      labetaloL (NORMODYNE) 200 MG tablet Take 1 tablet (200 mg total) by mouth 2 (two) times daily. 60 tablet 3    losartan (COZAAR) 50 MG tablet Take 1 tablet (50 mg total) by mouth once daily.  30 tablet 3    multivitamin/iron/folic acid (CENTRUM COMPLETE ORAL) Take by mouth Daily.      mv-mn/FA/bl coh/isoflav/jujube (ESTROVEN MENOPAUSE ORAL) Take by mouth once daily.      nitroGLYCERIN (NITROSTAT) 0.4 MG SL tablet Place 0.4 mg under the tongue every 5 (five) minutes as needed.      pantoprazole (PROTONIX) 40 MG tablet TAKE ONE Tablet BY MOUTH TWICE DAILY 60 tablet 3    REPATHA SURECLICK 140 mg/mL PnIj Inject 140 mg into the skin every 14 (fourteen) days.      zinc gluconate 50 mg tablet Take 50 mg by mouth once daily. Takes as needed      clopidogreL (PLAVIX) 75 mg tablet Take 1 tablet (75 mg total) by mouth once daily. 30 tablet 3    cyclobenzaprine (FLEXERIL) 10 MG tablet Take 1 tablet (10 mg total) by mouth 3 (three) times daily as needed for Muscle spasms. ONLY TAKES PRN 60 tablet 0    doxycycline (MONODOX) 100 MG capsule Take 1 capsule (100 mg total) by mouth 2 (two) times daily. 14 capsule 0    ezetimibe (ZETIA) 10 mg tablet Take 10 mg by mouth once daily.      famotidine (PEPCID) 20 MG tablet Take 20 mg by mouth Daily.       Facility-Administered Encounter Medications as of 5/31/2022   Medication Dose Route Frequency Provider Last Rate Last Admin    cefTRIAXone injection 1 g  1 g Intramuscular 1 time in Clinic/HOD Katia Zepeda NP        dexamethasone injection 4 mg  4 mg Intramuscular 1 time in Clinic/HOD Katia Zepeda NP         No orders of the defined types were placed in this encounter.      Plan:       Problem List Items Addressed This Visit        Pulmonary    Chronic obstructive pulmonary disease    Bronchitis     Patient presents today for evaluation of a chronic cough and shortness of breath is been recurrent despite multiple rounds antibiotic  she is currently on Ventolin and currently on Symbicort without any improvement.  She meets the definition of chronic bronchitis we spent a long time talking about how to get off cigarettes has going to be the main that makes her  better will see her back in 2 months to see how she does with this I would   not recommend using antibiotics I would treat with anti-inflammatories and consider adding theophylline or anticholinergics for spells I would give reflux precautions steroids and antibiotics she will get better unless she quit smoking

## 2022-05-31 NOTE — ASSESSMENT & PLAN NOTE
Patient presents today for evaluation of a chronic cough and shortness of breath is been recurrent despite multiple rounds antibiotic  she is currently on Ventolin and currently on Symbicort without any improvement.  She meets the definition of chronic bronchitis we spent a long time talking about how to get off cigarettes has going to be the main that makes her better will see her back in 2 months to see how she does with this I would   not recommend using antibiotics I would treat with anti-inflammatories and consider adding theophylline or anticholinergics for spells I would give reflux precautions steroids and antibiotics she will get better unless she quit smoking

## 2022-06-06 ENCOUNTER — CLINICAL SUPPORT (OUTPATIENT)
Dept: REHABILITATION | Facility: HOSPITAL | Age: 52
End: 2022-06-06
Attending: OTOLARYNGOLOGY
Payer: COMMERCIAL

## 2022-06-06 DIAGNOSIS — R42 VERTIGO: ICD-10-CM

## 2022-06-06 PROCEDURE — 97161 PT EVAL LOW COMPLEX 20 MIN: CPT

## 2022-06-06 NOTE — PLAN OF CARE
[facility] OUTPATIENT REHABILITATION  Physical Therapy Initial Evaluation       Name: Kirti Morris  Clinic Number: [id number]    Therapy Diagnosis:   Encounter Diagnosis   Name Primary?    Vertigo      Physician: Shorty Schmitz MD    Physician Orders: PT Eval and Treat  Medical Diagnosis from Referral: vertigo  Evaluation Date: 6/6/2022  Authorization Period Expiration: 12/31/2022  Plan of Care Expiration: 6/6/2022  Visit # / Visits authorized: 1/20    Time In: 0818  Time Out: 0900  Total Billable Time: 42 minutes    Precautions: Standard and Fall    Subjective     Date of onset: ~2.5 years ago    History of current condition - Kirti reports she fell and hit her head on a 4x4 wooden post ~2.5 years ago. Patient reports no significant injuries from the fall (aside from an indention on her forehead that took quite some time to heal). Patient reports she has had frequent dizziness and lightheadedness with near syncopal episodes since her fall. Patient reports her father had a similar episode that responded well to physical therapy treatment which led her to requesting a referral for physical therapy evaluation. Patient has a history of a CVA and reports she was recently diagnosed with orthostatic hypotension. Patient returns to her PCP on 6/9/2022.     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    Medications:   Kirti has a current medication list which includes the following prescription(s): albuterol, amlodipine, ascorbic acid (vitamin c), aspirin, azithromycin, budesonide-formoterol 160-4.5 mcg, cetirizine, cholecalciferol (vitamin d3), clopidogrel, cyclobenzaprine, doxycycline, ezetimibe, famotidine, fluconazole, fluticasone propionate, gabapentin, hydrocodone-acetaminophen, ibuprofen, labetalol, losartan, multivitamin/iron/folic acid, mv-mn/fa/bl coh/isoflav/jujube, nitroglycerin, pantoprazole, repatha sureclick, and zinc gluconate, and the following Facility-Administered Medications: ceftriaxone and dexamethasone.    Allergies:   Review of patient's allergies indicates:   Allergen Reactions    Atorvastatin      LIPITOR    Levsin [hyoscyamine sulfate]     Nuts [tree nut]      BRAZIL NUTS    Pravastatin     Sulfa (sulfonamide antibiotics)         Imaging: no imagine in Epic related to presenting condition    Prior Therapy: none for presenting condition  Prior Level of Function: independent with all functional mobility without assistive device  Current Level of Function: independent with all functional mobility without assistive device  History of Falls: several falls since initial fall on porch ~2.5 years ago (unable to state exact number)  Social History: lives with their spouse; Mobile Home  Steps/Stairs: 6 with bilateral handrails  Occupation: non currently employed  Driving: Yes  Durable Medical Equipment: none related to presenting condition  Dominant Extremity: right  Affected Extremity: N/A    Pain:  Current 6/10  Location: generalized    Pts goals: Patient wishes to decrease episodes of dizziness/lightheadedness.    Objective     Range of Motion/Strength:     CERVICAL AROM Pain/Dysfunction with Movement   Flexion WFL    Extension WFL    Right side bending WFL     Left side bending WFL    Right rotation WFL    Left rotation WFL      Posture: rounded   Palpation: N/A  Sensation: WFL    Special Tests: negative Karina Hallpike bilaterally; negative for gaze stabilization issues    Gait Analysis: slow jeanie; cautious; no loss of balance    TINETTI BALANCE ASSESSMENT TOOL    BALANCE SECTION  Patient is seated in hard, armless chair.    1.Sitting Balance: Steady; safe = 1  2.Rises from chair: Able, uses arms to help = 1  3.Attempts to arise: Able to arise, 1 attempt = 2  4.Immediate standing Balance (first 5 seconds): Steady without walker or other support = 2  5.Standing balance: Narrow stance without support = 2  6.Nudged: Steady = 2  7.Eyes closed: Steady = 1  8.Turning 360 degrees: Continuous = 1 and Steady = 1  9.Sitting Down: Uses arms or not a smooth motion = 1    Balance Score: 14/16    GAIT SECTION  Patient stands with therapist, walks across room (+/- aids), first at usual pace, then at rapid pace.    10.Initiation of Gait (Immediately after told to go.): No hesitancy = 1  11.Step length and height: Step through R=1 and Step through L=1  12.Foot Clearance: L foot clears floor=1 and R foot clears floor=1  13.Step symmetry: Right & Left step length appear equal = 1  14.Step continuity: Steps appear continuous = 1  15.Path: Mild/moderate deviation or uses walking aid = 1  16.Trunk: No sway, but flexion of knees or back or spreads arm out while walking = 1  17.Walking Time: Heels amost touching while walking = 1    Gait Score: 10/12  Total Score (Balance + Gait Score): 24/28     Risk Indicators:    Tinetti Tool Score   Risk of Falls   ?18    High   19-23   Moderate   ?24   Low    Transfers: modified independent with use of upper extremities as needed    TREATMENT     Home Exercises and Patient Education Provided    Education provided: Patient advised to return to physical therapy for further evaluation should she develop symptoms that are consistent with benign paroxysmal  positional vertigo.     Written Home Exercises Provided: No home exercise program indicated at this time. Kirti demonstrated good understanding of the education provided.     Assessment     Kirti is a 51 y.o. female referred to outpatient physical therapy with a medical diagnosis of vertigo. Patient negative for benign paroxysmal positional vertigo and gaze stabilization issues with no significant balance deficits noted. Patient's verbal report of history of symptoms reveals that patient has likely had benign paroxysmal positional vertigo at some point in the past; however, patient is not having an active episode at the present time. Patient's current presentation and report of symptoms seems be more closely related to her new diagnosis of orthostatic hypotension. Patient advised to discuss this further with Katia Zepeda NP at her appointment on 6/9/2022. No further skilled physical therapy treatment indicated at this time.     Plan of care discussed with patient: Yes  Pt's spiritual, cultural and educational needs considered and pt agreeable to plan of care and goals as stated below:     Decision Making/ Complexity Score: low    Plan     Plan of care Certification: 6/6/2022 to 6/6/2022.    Physical therapy eval only. No further skilled physical therapy treatment indicated at this time.     Cole Mcmillan, PT, DPT  6/6/2022    I CERTIFY THE NEED FOR THESE SERVICES FURNISHED UNDER THIS PLAN OF TREATMENT AND WHILE UNDER MY CARE.    Physician's comments:

## 2022-06-20 PROBLEM — Z00.00 WELLNESS EXAMINATION: Status: RESOLVED | Noted: 2022-03-17 | Resolved: 2022-06-20

## 2022-06-20 PROBLEM — Z13.1 SCREENING FOR DIABETES MELLITUS: Status: RESOLVED | Noted: 2022-03-17 | Resolved: 2022-06-20

## 2022-06-20 PROBLEM — Z13.220 SCREENING FOR LIPOID DISORDERS: Status: RESOLVED | Noted: 2022-03-17 | Resolved: 2022-06-20

## 2022-07-11 PROBLEM — J01.00 ACUTE NON-RECURRENT MAXILLARY SINUSITIS: Status: RESOLVED | Noted: 2022-04-11 | Resolved: 2022-07-11

## 2022-07-26 ENCOUNTER — OFFICE VISIT (OUTPATIENT)
Dept: FAMILY MEDICINE | Facility: CLINIC | Age: 52
End: 2022-07-26
Payer: COMMERCIAL

## 2022-07-26 VITALS
BODY MASS INDEX: 39.65 KG/M2 | DIASTOLIC BLOOD PRESSURE: 84 MMHG | SYSTOLIC BLOOD PRESSURE: 130 MMHG | HEART RATE: 57 BPM | RESPIRATION RATE: 20 BRPM | OXYGEN SATURATION: 99 % | TEMPERATURE: 98 F | HEIGHT: 65 IN | WEIGHT: 238 LBS

## 2022-07-26 DIAGNOSIS — M54.9 BACK PAIN, UNSPECIFIED BACK LOCATION, UNSPECIFIED BACK PAIN LATERALITY, UNSPECIFIED CHRONICITY: ICD-10-CM

## 2022-07-26 DIAGNOSIS — M62.838 MUSCLE SPASM: ICD-10-CM

## 2022-07-26 DIAGNOSIS — M54.50 ACUTE LEFT-SIDED LOW BACK PAIN WITHOUT SCIATICA: Primary | ICD-10-CM

## 2022-07-26 PROCEDURE — 3079F DIAST BP 80-89 MM HG: CPT | Mod: ,,, | Performed by: NURSE PRACTITIONER

## 2022-07-26 PROCEDURE — 1159F PR MEDICATION LIST DOCUMENTED IN MEDICAL RECORD: ICD-10-PCS | Mod: ,,, | Performed by: NURSE PRACTITIONER

## 2022-07-26 PROCEDURE — 96372 THER/PROPH/DIAG INJ SC/IM: CPT | Mod: ,,, | Performed by: NURSE PRACTITIONER

## 2022-07-26 PROCEDURE — 99214 PR OFFICE/OUTPT VISIT, EST, LEVL IV, 30-39 MIN: ICD-10-PCS | Mod: 25,,, | Performed by: NURSE PRACTITIONER

## 2022-07-26 PROCEDURE — 3008F PR BODY MASS INDEX (BMI) DOCUMENTED: ICD-10-PCS | Mod: ,,, | Performed by: NURSE PRACTITIONER

## 2022-07-26 PROCEDURE — 3075F SYST BP GE 130 - 139MM HG: CPT | Mod: ,,, | Performed by: NURSE PRACTITIONER

## 2022-07-26 PROCEDURE — 3008F BODY MASS INDEX DOCD: CPT | Mod: ,,, | Performed by: NURSE PRACTITIONER

## 2022-07-26 PROCEDURE — 3079F PR MOST RECENT DIASTOLIC BLOOD PRESSURE 80-89 MM HG: ICD-10-PCS | Mod: ,,, | Performed by: NURSE PRACTITIONER

## 2022-07-26 PROCEDURE — 99214 OFFICE O/P EST MOD 30 MIN: CPT | Mod: 25,,, | Performed by: NURSE PRACTITIONER

## 2022-07-26 PROCEDURE — 1159F MED LIST DOCD IN RCRD: CPT | Mod: ,,, | Performed by: NURSE PRACTITIONER

## 2022-07-26 PROCEDURE — 96372 PR INJECTION,THERAP/PROPH/DIAG2ST, IM OR SUBCUT: ICD-10-PCS | Mod: ,,, | Performed by: NURSE PRACTITIONER

## 2022-07-26 PROCEDURE — 3075F PR MOST RECENT SYSTOLIC BLOOD PRESS GE 130-139MM HG: ICD-10-PCS | Mod: ,,, | Performed by: NURSE PRACTITIONER

## 2022-07-26 PROCEDURE — 4010F ACE/ARB THERAPY RXD/TAKEN: CPT | Mod: ,,, | Performed by: NURSE PRACTITIONER

## 2022-07-26 PROCEDURE — 4010F PR ACE/ARB THEARPY RXD/TAKEN: ICD-10-PCS | Mod: ,,, | Performed by: NURSE PRACTITIONER

## 2022-07-26 RX ORDER — METHYLPREDNISOLONE ACETATE 40 MG/ML
40 INJECTION, SUSPENSION INTRA-ARTICULAR; INTRALESIONAL; INTRAMUSCULAR; SOFT TISSUE
Status: COMPLETED | OUTPATIENT
Start: 2022-07-26 | End: 2022-07-26

## 2022-07-26 RX ORDER — TIZANIDINE 4 MG/1
4 TABLET ORAL EVERY 6 HOURS PRN
Qty: 30 TABLET | Refills: 0 | Status: SHIPPED | OUTPATIENT
Start: 2022-07-26 | End: 2022-08-04 | Stop reason: SDUPTHER

## 2022-07-26 RX ORDER — NAPROXEN SODIUM 550 MG/1
550 TABLET ORAL 2 TIMES DAILY WITH MEALS
Qty: 30 TABLET | Refills: 0 | Status: SHIPPED | OUTPATIENT
Start: 2022-07-26 | End: 2023-12-12

## 2022-07-26 RX ORDER — KETOROLAC TROMETHAMINE 30 MG/ML
60 INJECTION, SOLUTION INTRAMUSCULAR; INTRAVENOUS
Status: COMPLETED | OUTPATIENT
Start: 2022-07-26 | End: 2022-07-26

## 2022-07-26 RX ORDER — DEXAMETHASONE SODIUM PHOSPHATE 4 MG/ML
4 INJECTION, SOLUTION INTRA-ARTICULAR; INTRALESIONAL; INTRAMUSCULAR; INTRAVENOUS; SOFT TISSUE
Status: COMPLETED | OUTPATIENT
Start: 2022-07-26 | End: 2022-07-26

## 2022-07-26 RX ADMIN — KETOROLAC TROMETHAMINE 60 MG: 30 INJECTION, SOLUTION INTRAMUSCULAR; INTRAVENOUS at 11:07

## 2022-07-26 RX ADMIN — DEXAMETHASONE SODIUM PHOSPHATE 4 MG: 4 INJECTION, SOLUTION INTRA-ARTICULAR; INTRALESIONAL; INTRAMUSCULAR; INTRAVENOUS; SOFT TISSUE at 11:07

## 2022-07-26 RX ADMIN — METHYLPREDNISOLONE ACETATE 40 MG: 40 INJECTION, SUSPENSION INTRA-ARTICULAR; INTRALESIONAL; INTRAMUSCULAR; SOFT TISSUE at 11:07

## 2022-07-28 ENCOUNTER — CLINICAL SUPPORT (OUTPATIENT)
Dept: OTOLARYNGOLOGY | Facility: CLINIC | Age: 52
End: 2022-07-28
Payer: COMMERCIAL

## 2022-07-28 DIAGNOSIS — J30.81 ALLERGIC RHINITIS DUE TO ANIMAL HAIR OR DANDER: ICD-10-CM

## 2022-07-28 DIAGNOSIS — J30.89 ALLERGIC RHINITIS DUE TO HOUSE DUST MITE: ICD-10-CM

## 2022-07-28 DIAGNOSIS — J30.81 ALLERGIC RHINITIS DUE TO DOG HAIR: ICD-10-CM

## 2022-07-28 DIAGNOSIS — J30.81 ALLERGIC RHINITIS DUE TO ANIMAL (CAT) (DOG) HAIR AND DANDER: ICD-10-CM

## 2022-07-28 DIAGNOSIS — J31.0 CHRONIC RHINITIS: Primary | ICD-10-CM

## 2022-07-28 DIAGNOSIS — J30.81 ALLERGY TO DOG DANDER: ICD-10-CM

## 2022-07-28 DIAGNOSIS — J30.81 ALLERGIC TO DOGS: ICD-10-CM

## 2022-07-28 DIAGNOSIS — J30.1 ALLERGIC REACTION TO WEED POLLEN: ICD-10-CM

## 2022-07-28 DIAGNOSIS — J30.89 ALLERGIC RHINITIS DUE TO DERMATOPHAGOIDES PTERONYSSINUS: ICD-10-CM

## 2022-07-28 DIAGNOSIS — J30.81 ALLERGIC RHINITIS DUE TO DOGS: ICD-10-CM

## 2022-07-28 DIAGNOSIS — J30.89 ALLERGIC RHINITIS DUE TO DERMATOPHAGOIDES FARINAE: ICD-10-CM

## 2022-07-28 PROCEDURE — 95165 ANTIGEN THERAPY SERVICES: CPT | Mod: PBBFAC | Performed by: OTOLARYNGOLOGY

## 2022-07-28 PROCEDURE — 95115 IMMUNOTHERAPY ONE INJECTION: CPT | Mod: PBBFAC | Performed by: OTOLARYNGOLOGY

## 2022-07-28 PROCEDURE — 95165 PR PROFES SVC,IMMUNOTHER,SINGLE/MULT AGS: ICD-10-PCS | Mod: S$PBB,,, | Performed by: OTOLARYNGOLOGY

## 2022-07-28 PROCEDURE — 95165 ANTIGEN THERAPY SERVICES: CPT | Mod: S$PBB,,, | Performed by: OTOLARYNGOLOGY

## 2022-07-28 RX ORDER — EPINEPHRINE 0.3 MG/.3ML
1 INJECTION SUBCUTANEOUS ONCE
Qty: 0.3 ML | Refills: 0 | Status: SHIPPED | OUTPATIENT
Start: 2022-07-28 | End: 2023-08-23 | Stop reason: SDUPTHER

## 2022-07-28 NOTE — PROGRESS NOTES
Established patient with Dr. Schmitz. See previous note for written order. Allergy injections per Dr. Schmitz.  Vial test administered from new vial A.  10 minute vial test mm reactions  Administered left arm at 2:49 PM  3mm observed.  First dose of 0.02ml given.  20 minute mm reaction  2:50 PM   left arm; observation 5mm.

## 2022-08-03 ENCOUNTER — CLINICAL SUPPORT (OUTPATIENT)
Dept: OTOLARYNGOLOGY | Facility: CLINIC | Age: 52
End: 2022-08-03
Payer: COMMERCIAL

## 2022-08-03 DIAGNOSIS — J30.81 ALLERGIC TO DOGS: ICD-10-CM

## 2022-08-03 DIAGNOSIS — J30.81 ALLERGIC RHINITIS DUE TO ANIMAL HAIR OR DANDER: ICD-10-CM

## 2022-08-03 DIAGNOSIS — J30.81 ALLERGIC RHINITIS DUE TO DOG HAIR: ICD-10-CM

## 2022-08-03 DIAGNOSIS — J30.1 ALLERGIC REACTION TO WEED POLLEN: ICD-10-CM

## 2022-08-03 DIAGNOSIS — J30.89 ALLERGIC RHINITIS DUE TO DERMATOPHAGOIDES FARINAE: ICD-10-CM

## 2022-08-03 DIAGNOSIS — J30.81 ALLERGIC RHINITIS DUE TO DOGS: ICD-10-CM

## 2022-08-03 DIAGNOSIS — J30.89 ALLERGIC RHINITIS DUE TO DERMATOPHAGOIDES PTERONYSSINUS: ICD-10-CM

## 2022-08-03 DIAGNOSIS — J30.81 ALLERGY TO DOG DANDER: ICD-10-CM

## 2022-08-03 DIAGNOSIS — J30.89 ALLERGIC RHINITIS DUE TO HOUSE DUST MITE: ICD-10-CM

## 2022-08-03 DIAGNOSIS — J30.81 ALLERGIC RHINITIS DUE TO ANIMAL (CAT) (DOG) HAIR AND DANDER: ICD-10-CM

## 2022-08-03 DIAGNOSIS — J31.0 CHRONIC RHINITIS: Primary | ICD-10-CM

## 2022-08-03 PROCEDURE — 95115 IMMUNOTHERAPY ONE INJECTION: CPT | Mod: PBBFAC | Performed by: OTOLARYNGOLOGY

## 2022-08-04 ENCOUNTER — OFFICE VISIT (OUTPATIENT)
Dept: FAMILY MEDICINE | Facility: CLINIC | Age: 52
End: 2022-08-04
Payer: COMMERCIAL

## 2022-08-04 VITALS
RESPIRATION RATE: 18 BRPM | BODY MASS INDEX: 38.85 KG/M2 | SYSTOLIC BLOOD PRESSURE: 134 MMHG | OXYGEN SATURATION: 99 % | WEIGHT: 233.19 LBS | TEMPERATURE: 98 F | DIASTOLIC BLOOD PRESSURE: 84 MMHG | HEART RATE: 77 BPM | HEIGHT: 65 IN

## 2022-08-04 DIAGNOSIS — E78.5 HYPERLIPIDEMIA, UNSPECIFIED HYPERLIPIDEMIA TYPE: ICD-10-CM

## 2022-08-04 DIAGNOSIS — R53.83 FATIGUE, UNSPECIFIED TYPE: ICD-10-CM

## 2022-08-04 DIAGNOSIS — E55.9 VITAMIN D DEFICIENCY: ICD-10-CM

## 2022-08-04 DIAGNOSIS — I10 ESSENTIAL HYPERTENSION: ICD-10-CM

## 2022-08-04 DIAGNOSIS — M62.838 MUSCLE SPASM: ICD-10-CM

## 2022-08-04 DIAGNOSIS — M54.50 CHRONIC LEFT-SIDED LOW BACK PAIN WITHOUT SCIATICA: Primary | ICD-10-CM

## 2022-08-04 DIAGNOSIS — G89.29 CHRONIC LEFT-SIDED LOW BACK PAIN WITHOUT SCIATICA: Primary | ICD-10-CM

## 2022-08-04 DIAGNOSIS — E53.8 VITAMIN B12 DEFICIENCY: ICD-10-CM

## 2022-08-04 DIAGNOSIS — I25.10 CORONARY ARTERY DISEASE, UNSPECIFIED VESSEL OR LESION TYPE, UNSPECIFIED WHETHER ANGINA PRESENT, UNSPECIFIED WHETHER NATIVE OR TRANSPLANTED HEART: ICD-10-CM

## 2022-08-04 DIAGNOSIS — M79.605 PAIN IN BOTH LOWER EXTREMITIES: ICD-10-CM

## 2022-08-04 DIAGNOSIS — M79.604 PAIN IN BOTH LOWER EXTREMITIES: ICD-10-CM

## 2022-08-04 LAB
25(OH)D3 SERPL-MCNC: 94.2 NG/ML
ALBUMIN SERPL BCP-MCNC: 3.8 G/DL (ref 3.5–5)
ALBUMIN/GLOB SERPL: 1.2 {RATIO}
ALP SERPL-CCNC: 90 U/L (ref 41–108)
ALT SERPL W P-5'-P-CCNC: 24 U/L (ref 13–56)
ANION GAP SERPL CALCULATED.3IONS-SCNC: 10 MMOL/L (ref 7–16)
AST SERPL W P-5'-P-CCNC: 16 U/L (ref 15–37)
BASOPHILS # BLD AUTO: 0.07 K/UL (ref 0–0.2)
BASOPHILS NFR BLD AUTO: 0.9 % (ref 0–1)
BILIRUB DIRECT SERPL-MCNC: 0.1 MG/DL (ref 0–0.2)
BILIRUB SERPL-MCNC: 0.4 MG/DL (ref 0–1.2)
BUN SERPL-MCNC: 20 MG/DL (ref 7–18)
BUN/CREAT SERPL: 10 (ref 6–20)
CALCIUM SERPL-MCNC: 9.1 MG/DL (ref 8.5–10.1)
CHLORIDE SERPL-SCNC: 109 MMOL/L (ref 98–107)
CHOLEST SERPL-MCNC: 134 MG/DL (ref 0–200)
CHOLEST/HDLC SERPL: 2.5 {RATIO}
CO2 SERPL-SCNC: 28 MMOL/L (ref 21–32)
CREAT SERPL-MCNC: 2.04 MG/DL (ref 0.55–1.02)
DIFFERENTIAL METHOD BLD: ABNORMAL
EOSINOPHIL # BLD AUTO: 0.21 K/UL (ref 0–0.5)
EOSINOPHIL NFR BLD AUTO: 2.8 % (ref 1–4)
ERYTHROCYTE [DISTWIDTH] IN BLOOD BY AUTOMATED COUNT: 12.8 % (ref 11.5–14.5)
FOLATE SERPL-MCNC: >20 NG/ML (ref 3.1–17.5)
GLOBULIN SER-MCNC: 3.1 G/DL (ref 2–4)
GLUCOSE SERPL-MCNC: 107 MG/DL (ref 74–106)
HCT VFR BLD AUTO: 43 % (ref 38–47)
HDLC SERPL-MCNC: 53 MG/DL (ref 40–60)
HGB BLD-MCNC: 14.1 G/DL (ref 12–16)
IMM GRANULOCYTES # BLD AUTO: 0.02 K/UL (ref 0–0.04)
IMM GRANULOCYTES NFR BLD: 0.3 % (ref 0–0.4)
LDLC SERPL CALC-MCNC: 41 MG/DL
LDLC/HDLC SERPL: 0.8 {RATIO}
LYMPHOCYTES # BLD AUTO: 2.82 K/UL (ref 1–4.8)
LYMPHOCYTES NFR BLD AUTO: 37.7 % (ref 27–41)
MAGNESIUM SERPL-MCNC: 2.3 MG/DL (ref 1.7–2.3)
MCH RBC QN AUTO: 31.8 PG (ref 27–31)
MCHC RBC AUTO-ENTMCNC: 32.8 G/DL (ref 32–36)
MCV RBC AUTO: 97.1 FL (ref 80–96)
MONOCYTES # BLD AUTO: 0.54 K/UL (ref 0–0.8)
MONOCYTES NFR BLD AUTO: 7.2 % (ref 2–6)
MPC BLD CALC-MCNC: 11.4 FL (ref 9.4–12.4)
NEUTROPHILS # BLD AUTO: 3.83 K/UL (ref 1.8–7.7)
NEUTROPHILS NFR BLD AUTO: 51.1 % (ref 53–65)
NONHDLC SERPL-MCNC: 81 MG/DL
NRBC # BLD AUTO: 0 X10E3/UL
NRBC, AUTO (.00): 0 %
PLATELET # BLD AUTO: 273 K/UL (ref 150–400)
POTASSIUM SERPL-SCNC: 5 MMOL/L (ref 3.5–5.1)
PROT SERPL-MCNC: 6.9 G/DL (ref 6.4–8.2)
RBC # BLD AUTO: 4.43 M/UL (ref 4.2–5.4)
SODIUM SERPL-SCNC: 142 MMOL/L (ref 136–145)
T4 FREE SERPL-MCNC: 1.17 NG/DL (ref 0.76–1.46)
TRIGL SERPL-MCNC: 199 MG/DL (ref 35–150)
TSH SERPL DL<=0.005 MIU/L-ACNC: 1.4 UIU/ML (ref 0.36–3.74)
VIT B12 SERPL-MCNC: 311 PG/ML (ref 193–986)
VLDLC SERPL-MCNC: 40 MG/DL
WBC # BLD AUTO: 7.49 K/UL (ref 4.5–11)

## 2022-08-04 PROCEDURE — 3079F PR MOST RECENT DIASTOLIC BLOOD PRESSURE 80-89 MM HG: ICD-10-PCS | Mod: ,,, | Performed by: NURSE PRACTITIONER

## 2022-08-04 PROCEDURE — 1159F MED LIST DOCD IN RCRD: CPT | Mod: ,,, | Performed by: NURSE PRACTITIONER

## 2022-08-04 PROCEDURE — 82607 VITAMIN B-12: CPT | Mod: ICN,,, | Performed by: CLINICAL MEDICAL LABORATORY

## 2022-08-04 PROCEDURE — 80061 LIPID PANEL: CPT | Mod: ICN,,, | Performed by: CLINICAL MEDICAL LABORATORY

## 2022-08-04 PROCEDURE — 83735 ASSAY OF MAGNESIUM: CPT | Mod: ICN,,, | Performed by: CLINICAL MEDICAL LABORATORY

## 2022-08-04 PROCEDURE — 3075F PR MOST RECENT SYSTOLIC BLOOD PRESS GE 130-139MM HG: ICD-10-PCS | Mod: ,,, | Performed by: NURSE PRACTITIONER

## 2022-08-04 PROCEDURE — 80050 GENERAL HEALTH PANEL: CPT | Mod: ICN,,, | Performed by: CLINICAL MEDICAL LABORATORY

## 2022-08-04 PROCEDURE — 82746 VITAMIN B12/FOLATE, SERUM PANEL: ICD-10-PCS | Mod: ICN,,, | Performed by: CLINICAL MEDICAL LABORATORY

## 2022-08-04 PROCEDURE — 84439 T4, FREE: ICD-10-PCS | Mod: ICN,,, | Performed by: CLINICAL MEDICAL LABORATORY

## 2022-08-04 PROCEDURE — 3008F BODY MASS INDEX DOCD: CPT | Mod: ,,, | Performed by: NURSE PRACTITIONER

## 2022-08-04 PROCEDURE — 99214 PR OFFICE/OUTPT VISIT, EST, LEVL IV, 30-39 MIN: ICD-10-PCS | Mod: ,,, | Performed by: NURSE PRACTITIONER

## 2022-08-04 PROCEDURE — 3075F SYST BP GE 130 - 139MM HG: CPT | Mod: ,,, | Performed by: NURSE PRACTITIONER

## 2022-08-04 PROCEDURE — 82306 VITAMIN D: ICD-10-PCS | Mod: ICN,,, | Performed by: CLINICAL MEDICAL LABORATORY

## 2022-08-04 PROCEDURE — 82248 BILIRUBIN DIRECT: CPT | Mod: ICN,,, | Performed by: CLINICAL MEDICAL LABORATORY

## 2022-08-04 PROCEDURE — 82248 BILIRUBIN, DIRECT: ICD-10-PCS | Mod: ICN,,, | Performed by: CLINICAL MEDICAL LABORATORY

## 2022-08-04 PROCEDURE — 3079F DIAST BP 80-89 MM HG: CPT | Mod: ,,, | Performed by: NURSE PRACTITIONER

## 2022-08-04 PROCEDURE — 1159F PR MEDICATION LIST DOCUMENTED IN MEDICAL RECORD: ICD-10-PCS | Mod: ,,, | Performed by: NURSE PRACTITIONER

## 2022-08-04 PROCEDURE — 80050 CBC WITH DIFFERENTIAL: ICD-10-PCS | Mod: ICN,,, | Performed by: CLINICAL MEDICAL LABORATORY

## 2022-08-04 PROCEDURE — 3008F PR BODY MASS INDEX (BMI) DOCUMENTED: ICD-10-PCS | Mod: ,,, | Performed by: NURSE PRACTITIONER

## 2022-08-04 PROCEDURE — 84439 ASSAY OF FREE THYROXINE: CPT | Mod: ICN,,, | Performed by: CLINICAL MEDICAL LABORATORY

## 2022-08-04 PROCEDURE — 83735 MAGNESIUM: ICD-10-PCS | Mod: ICN,,, | Performed by: CLINICAL MEDICAL LABORATORY

## 2022-08-04 PROCEDURE — 82607 VITAMIN B12/FOLATE, SERUM PANEL: ICD-10-PCS | Mod: ICN,,, | Performed by: CLINICAL MEDICAL LABORATORY

## 2022-08-04 PROCEDURE — 99214 OFFICE O/P EST MOD 30 MIN: CPT | Mod: ,,, | Performed by: NURSE PRACTITIONER

## 2022-08-04 PROCEDURE — 82746 ASSAY OF FOLIC ACID SERUM: CPT | Mod: ICN,,, | Performed by: CLINICAL MEDICAL LABORATORY

## 2022-08-04 PROCEDURE — 82306 VITAMIN D 25 HYDROXY: CPT | Mod: ICN,,, | Performed by: CLINICAL MEDICAL LABORATORY

## 2022-08-04 PROCEDURE — 80061 LIPID PANEL: ICD-10-PCS | Mod: ICN,,, | Performed by: CLINICAL MEDICAL LABORATORY

## 2022-08-04 PROCEDURE — 4010F PR ACE/ARB THEARPY RXD/TAKEN: ICD-10-PCS | Mod: ,,, | Performed by: NURSE PRACTITIONER

## 2022-08-04 PROCEDURE — 4010F ACE/ARB THERAPY RXD/TAKEN: CPT | Mod: ,,, | Performed by: NURSE PRACTITIONER

## 2022-08-04 RX ORDER — TIZANIDINE 4 MG/1
4 TABLET ORAL EVERY 6 HOURS PRN
Qty: 30 TABLET | Refills: 0 | Status: SHIPPED | OUTPATIENT
Start: 2022-08-04 | End: 2022-09-19

## 2022-08-04 NOTE — PROGRESS NOTES
Katia Zepeda NP   [facility]  [address] [address] [facility] [address]  [place]     PATIENT NAME: Kirti Morris  : 1970  DATE: 22  MRN: [id number]      Billing Provider: Katia Zepeda NP  Level of Service:   Patient PCP Information     Provider PCP Type    Katia Zepeda NP General          Reason for Visit / Chief Complaint: Follow-up (R/T BACK PAIN. Continues to use heating pad. States muscle relaxers have helped. ), Fatigue, and Muscle Pain (Cramps. Would like to check potassium level. )       Update PCP  Update Chief Complaint         History of Present Illness / Problem Focused Workflow     Kirti Morris presents to the clinic here for f/u back pain, continues to use heating pad, states muscle relaxers have helped. Fatigue and muscle pain, cramps, would like to check potassium level. Also has lab work due  from Dr finley office      Review of Systems     Review of Systems   Constitutional: Positive for fatigue. Negative for chills and fever.   HENT: Negative for nasal congestion, ear pain, facial swelling, hearing loss, mouth dryness, mouth sores, postnasal drip, rhinorrhea, sinus pressure/congestion and goiter.    Eyes: Negative for discharge and itching.   Respiratory: Negative for cough, shortness of breath and wheezing.    Cardiovascular: Negative for chest pain and leg swelling.   Gastrointestinal: Negative for abdominal pain, change in bowel habit and change in bowel habit.   Genitourinary: Negative for difficulty urinating, dysuria, enuresis, frequency, hematuria and urgency.   Musculoskeletal: Positive for back pain and leg pain (stated legs dont want to work).        Muscle spasms of legs   Neurological: Negative for dizziness, vertigo, syncope, weakness and headaches.   Psychiatric/Behavioral: Negative for decreased concentration.        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/2020    COPD (chronic obstructive pulmonary disease)     Coronary artery disease     acute MI 02/2014    CRF (chronic renal failure)     GERD (gastroesophageal reflux disease)     Hyperlipidemia     Hypertension     Myocardial infarction     Nonrheumatic mitral (valve) insufficiency 05/21/2018    Sleep apnea 01/06/2020    Stroke     Vitamin D deficiency 07/31/2020       Past Surgical History:   Procedure Laterality Date    CORONARY ANGIOPLASTY WITH STENT PLACEMENT  02/07/2014    HYSTERECTOMY      RADIOFREQUENCY ABLATION Right 10/13/2021    Procedure: RADIOFREQUENCY ABLATION;  Surgeon: David Combs MD;  Location: [facility];  Service: Pain Management;  Laterality: Right;  Right SI RFTC       Social History  Ms.  reports that she has been smoking cigarettes. She has been smoking about 1.00 pack per day. She has never used smokeless tobacco. She reports current alcohol use. She reports that she does not use drugs.    Family History  Ms.'s family history includes Arthritis in her mother; Ovarian cancer in her maternal aunt.    Medications and Allergies     Medications  Outpatient Medications Marked as Taking for the 8/4/22 encounter (Office Visit) with Katia Zepeda NP   Medication Sig Dispense Refill    albuterol (VENTOLIN HFA) 90 mcg/actuation inhaler Inhale 2 puffs into the lungs 2 (two) times daily as needed (SOB). 2 puffs BID AS NEEDED. Not to exceed 4 times per day. 18 g 0    amLODIPine (NORVASC) 10 MG tablet Take 1 tablet (10 mg total) by mouth once daily. 30 tablet 3    ascorbic acid, vitamin C, (VITAMIN C) 500 MG tablet 500 mg.      aspirin (ECOTRIN) 81 MG EC tablet Take 81 mg by mouth once daily.      budesonide-formoterol 160-4.5 mcg (SYMBICORT) 160-4.5 mcg/actuation HFAA Inhale 2 puffs into the lungs every 12 (twelve) hours. Controller - USES PRN. 10.2 g 3    cetirizine (ZYRTEC) 10 MG tablet Take 1  tablet (10 mg total) by mouth once daily. 30 tablet 3    cholecalciferol, vitamin D3, (VITAMIN D3) 250 mcg (10,000 unit) Cap TAKE ONE CAPSULE BY MOUTH ONE TIME DAILY 90 capsule 0    clopidogreL (PLAVIX) 75 mg tablet Take 1 tablet (75 mg total) by mouth once daily. 30 tablet 3    ezetimibe (ZETIA) 10 mg tablet Take 10 mg by mouth once daily.      famotidine (PEPCID) 20 MG tablet Take 20 mg by mouth Daily.      fluticasone propionate (FLONASE) 50 mcg/actuation nasal spray 1 spray (50 mcg total) by Each Nostril route once daily. 15 g 0    gabapentin (NEURONTIN) 600 MG tablet TAKE ONE TABLET BY MOUTH THREE TIMES DAILY 90 tablet 3    HYDROcodone-acetaminophen (NORCO) 7.5-325 mg per tablet Take 1 tablet by mouth 2 (two) times daily as needed.      ibuprofen (ADVIL,MOTRIN) 600 MG tablet Take 600 mg by mouth every 8 (eight) hours as needed for Pain.      labetaloL (NORMODYNE) 200 MG tablet Take 1 tablet (200 mg total) by mouth 2 (two) times daily. 60 tablet 3    losartan (COZAAR) 50 MG tablet Take 1 tablet (50 mg total) by mouth once daily. 30 tablet 3    multivitamin/iron/folic acid (CENTRUM COMPLETE ORAL) Take by mouth Daily.      mv-mn/FA/bl coh/isoflav/jujube (ESTROVEN MENOPAUSE ORAL) Take by mouth once daily.      naproxen sodium (ANAPROX) 550 MG tablet Take 1 tablet (550 mg total) by mouth 2 (two) times daily with meals. 30 tablet 0    nitroGLYCERIN (NITROSTAT) 0.4 MG SL tablet Place 0.4 mg under the tongue every 5 (five) minutes as needed.      pantoprazole (PROTONIX) 40 MG tablet TAKE ONE Tablet BY MOUTH TWICE DAILY 60 tablet 3    REPATHA SURECLICK 140 mg/mL PnIj Inject 140 mg into the skin every 14 (fourteen) days.      tiZANidine (ZANAFLEX) 4 MG tablet Take 1 tablet (4 mg total) by mouth every 6 (six) hours as needed (muscle spasms). donot take flexeril while taking zanaflex 30 tablet 0    zinc gluconate 50 mg tablet Take 50 mg by mouth once daily. Takes as needed       Current  "Facility-Administered Medications for the 8/4/22 encounter (Office Visit) with Katia Zepeda NP   Medication Dose Route Frequency Provider Last Rate Last Admin    [COMPLETED] Allergy Mix   Subcutaneous 1 time in Clinic/HOD Shorty Schmitz MD   0.04 mL at 08/03/22 1421       Allergies  Review of patient's allergies indicates:   Allergen Reactions    Atorvastatin      LIPITOR    Dog dander      Dog hair & epithelia    House dust mite      D. Ptery, D. Farinae    Levsin [hyoscyamine sulfate]     Nuts [tree nut]      BRAZIL NUTS    Pravastatin     Sulfa (sulfonamide antibiotics)     Weed pollen-short ragweed        Physical Examination     Vitals:    08/04/22 0855   BP: 134/84   BP Location: Right arm   Patient Position: Sitting   Pulse: 77   Resp: 18   Temp: 98 °F (36.7 °C)   TempSrc: Oral   SpO2: 99%   Weight: 105.8 kg (233 lb 3.2 oz)   Height: 5' 5" (1.651 m)      Physical Exam  Constitutional:       General: She is not in acute distress.     Appearance: Normal appearance.   HENT:      Head: Normocephalic.      Right Ear: Tympanic membrane, ear canal and external ear normal.      Left Ear: Tympanic membrane, ear canal and external ear normal.      Nose: Nose normal.      Mouth/Throat:      Mouth: Mucous membranes are moist.      Pharynx: Oropharynx is clear.   Eyes:      Extraocular Movements: Extraocular movements intact.      Conjunctiva/sclera: Conjunctivae normal.      Pupils: Pupils are equal, round, and reactive to light.   Cardiovascular:      Rate and Rhythm: Normal rate and regular rhythm.      Pulses: Normal pulses.      Heart sounds: Normal heart sounds.   Pulmonary:      Effort: Pulmonary effort is normal.      Breath sounds: Normal breath sounds.   Abdominal:      General: Bowel sounds are normal.      Palpations: Abdomen is soft.   Musculoskeletal:         General: Tenderness (low back pain, leg lift negative) present. Normal range of motion.   Skin:     General: Skin is warm and dry.      " Capillary Refill: Capillary refill takes less than 2 seconds.   Neurological:      General: No focal deficit present.      Mental Status: She is alert and oriented to person, place, and time.   Psychiatric:         Mood and Affect: Mood normal.         Behavior: Behavior normal.          Assessment and Plan (including Health Maintenance)      Problem List  Smart Sets  Document Outside HM   :    Plan: meds as ordered, keep miesha with pain clinic, return to clinic in 3 months and as needed    Muscle spasm  -     Cancel: Comprehensive Metabolic Panel; Future; Expected date: 08/04/2022  -     Cancel: Magnesium; Future; Expected date: 08/04/2022  -     Cancel: CBC Auto Differential; Future; Expected date: 08/04/2022  -     Cancel: Hepatic Function Panel; Future; Expected date: 08/04/2022  -     Cancel: TSH; Future; Expected date: 08/04/2022  -     Cancel: T4, Free; Future; Expected date: 08/04/2022  -     Cancel: Lipid Panel; Future; Expected date: 08/04/2022  -     Cancel: Vitamin B12 & Folate; Future; Expected date: 08/04/2022  -     Vitamin D; Future; Expected date: 08/04/2022  -     CBC Auto Differential; Future; Expected date: 08/04/2022  -     Comprehensive Metabolic Panel; Future; Expected date: 08/04/2022  -     Hepatic Function Panel; Future; Expected date: 08/04/2022  -     Lipid Panel; Future; Expected date: 08/04/2022  -     T4, Free; Future; Expected date: 08/04/2022  -     TSH; Future; Expected date: 08/04/2022  -     Bilirubin, Direct    Hyperlipidemia, unspecified hyperlipidemia type  -     Cancel: Comprehensive Metabolic Panel; Future; Expected date: 08/04/2022  -     Cancel: Magnesium; Future; Expected date: 08/04/2022  -     Cancel: CBC Auto Differential; Future; Expected date: 08/04/2022  -     Cancel: Hepatic Function Panel; Future; Expected date: 08/04/2022  -     Cancel: TSH; Future; Expected date: 08/04/2022  -     Cancel: T4, Free; Future; Expected date: 08/04/2022  -     Cancel: Lipid Panel;  Future; Expected date: 08/04/2022  -     Cancel: Vitamin B12 & Folate; Future; Expected date: 08/04/2022  -     Vitamin D; Future; Expected date: 08/04/2022  -     CBC Auto Differential; Future; Expected date: 08/04/2022  -     Comprehensive Metabolic Panel; Future; Expected date: 08/04/2022  -     Hepatic Function Panel; Future; Expected date: 08/04/2022  -     Lipid Panel; Future; Expected date: 08/04/2022  -     T4, Free; Future; Expected date: 08/04/2022  -     TSH; Future; Expected date: 08/04/2022  -     Bilirubin, Direct    Essential hypertension  -     Cancel: Comprehensive Metabolic Panel; Future; Expected date: 08/04/2022  -     Cancel: Magnesium; Future; Expected date: 08/04/2022  -     Cancel: CBC Auto Differential; Future; Expected date: 08/04/2022  -     Cancel: Hepatic Function Panel; Future; Expected date: 08/04/2022  -     Cancel: TSH; Future; Expected date: 08/04/2022  -     Cancel: T4, Free; Future; Expected date: 08/04/2022  -     Cancel: Lipid Panel; Future; Expected date: 08/04/2022  -     Cancel: Vitamin B12 & Folate; Future; Expected date: 08/04/2022  -     Vitamin D; Future; Expected date: 08/04/2022  -     CBC Auto Differential; Future; Expected date: 08/04/2022  -     Comprehensive Metabolic Panel; Future; Expected date: 08/04/2022  -     Hepatic Function Panel; Future; Expected date: 08/04/2022  -     Lipid Panel; Future; Expected date: 08/04/2022  -     T4, Free; Future; Expected date: 08/04/2022  -     TSH; Future; Expected date: 08/04/2022  -     Bilirubin, Direct    Coronary artery disease, unspecified vessel or lesion type, unspecified whether angina present, unspecified whether native or transplanted heart  -     Cancel: Comprehensive Metabolic Panel; Future; Expected date: 08/04/2022  -     Cancel: Magnesium; Future; Expected date: 08/04/2022  -     Cancel: CBC Auto Differential; Future; Expected date: 08/04/2022  -     Cancel: Hepatic Function Panel; Future; Expected date:  08/04/2022  -     Cancel: TSH; Future; Expected date: 08/04/2022  -     Cancel: T4, Free; Future; Expected date: 08/04/2022  -     Cancel: Lipid Panel; Future; Expected date: 08/04/2022  -     Cancel: Vitamin B12 & Folate; Future; Expected date: 08/04/2022  -     Vitamin D; Future; Expected date: 08/04/2022  -     CBC Auto Differential; Future; Expected date: 08/04/2022  -     Comprehensive Metabolic Panel; Future; Expected date: 08/04/2022  -     Hepatic Function Panel; Future; Expected date: 08/04/2022  -     Lipid Panel; Future; Expected date: 08/04/2022  -     T4, Free; Future; Expected date: 08/04/2022  -     TSH; Future; Expected date: 08/04/2022  -     Bilirubin, Direct    Fatigue, unspecified type  -     Cancel: Comprehensive Metabolic Panel; Future; Expected date: 08/04/2022  -     Cancel: Magnesium; Future; Expected date: 08/04/2022  -     Cancel: CBC Auto Differential; Future; Expected date: 08/04/2022  -     Cancel: Hepatic Function Panel; Future; Expected date: 08/04/2022  -     Cancel: TSH; Future; Expected date: 08/04/2022  -     Cancel: T4, Free; Future; Expected date: 08/04/2022  -     Cancel: Lipid Panel; Future; Expected date: 08/04/2022  -     Cancel: Vitamin B12 & Folate; Future; Expected date: 08/04/2022  -     Vitamin D; Future; Expected date: 08/04/2022  -     CBC Auto Differential; Future; Expected date: 08/04/2022  -     Comprehensive Metabolic Panel; Future; Expected date: 08/04/2022  -     Hepatic Function Panel; Future; Expected date: 08/04/2022  -     Lipid Panel; Future; Expected date: 08/04/2022  -     T4, Free; Future; Expected date: 08/04/2022  -     TSH; Future; Expected date: 08/04/2022  -     Bilirubin, Direct    Vitamin D deficiency    Vitamin B12 deficiency  -     Vitamin B12 & Folate; Future; Expected date: 08/04/2022    Pain in both lower extremities  -     Magnesium; Future; Expected date: 08/04/2022            Health Maintenance Due   Topic Date Due    Hepatitis C  Screening  Never done    Pneumococcal Vaccines (Age 0-64) (1 - PCV) Never done    HIV Screening  Never done    TETANUS VACCINE  Never done    Colorectal Cancer Screening  Never done    Shingles Vaccine (1 of 2) Never done    Mammogram  07/23/2022       Problem List Items Addressed This Visit        Cardiac/Vascular    Essential hypertension    Relevant Orders    Vitamin D    CBC Auto Differential    Comprehensive Metabolic Panel    Hepatic Function Panel    Lipid Panel    T4, Free    TSH    Bilirubin, Direct    Hyperlipidemia    Relevant Orders    Vitamin D    CBC Auto Differential    Comprehensive Metabolic Panel    Hepatic Function Panel    Lipid Panel    T4, Free    TSH    Bilirubin, Direct       Orthopedic    Muscle spasm - Primary    Relevant Orders    Vitamin D    CBC Auto Differential    Comprehensive Metabolic Panel    Hepatic Function Panel    Lipid Panel    T4, Free    TSH    Bilirubin, Direct      Other Visit Diagnoses     Coronary artery disease, unspecified vessel or lesion type, unspecified whether angina present, unspecified whether native or transplanted heart        Relevant Orders    Vitamin D    CBC Auto Differential    Comprehensive Metabolic Panel    Hepatic Function Panel    Lipid Panel    T4, Free    TSH    Bilirubin, Direct    Fatigue, unspecified type        Relevant Orders    Vitamin D    CBC Auto Differential    Comprehensive Metabolic Panel    Hepatic Function Panel    Lipid Panel    T4, Free    TSH    Bilirubin, Direct    Vitamin D deficiency        Vitamin B12 deficiency        Relevant Orders    Vitamin B12 & Folate    Pain in both lower extremities        Relevant Orders    Magnesium            Health Maintenance Topics with due status: Not Due       Topic Last Completion Date    Influenza Vaccine 12/08/2021    Lipid Panel 03/17/2022       Procedures     Future Appointments   Date Time Provider Department Center   11/10/2022  8:00 AM Katia Zepeda NP [facility]    3/20/2023  9:00 AM Katia Zepeda NP [facility]        Follow up in about 3 months (around 11/4/2022) for f\u.       Signature:  Katia Zepeda NP    Date of encounter: 8/4/22

## 2022-08-05 ENCOUNTER — HOSPITAL ENCOUNTER (EMERGENCY)
Facility: HOSPITAL | Age: 52
Discharge: HOME | End: 2022-08-05
Attending: FAMILY MEDICINE
Payer: COMMERCIAL

## 2022-08-05 VITALS
RESPIRATION RATE: 16 BRPM | SYSTOLIC BLOOD PRESSURE: 118 MMHG | OXYGEN SATURATION: 95 % | BODY MASS INDEX: 41.65 KG/M2 | DIASTOLIC BLOOD PRESSURE: 66 MMHG | TEMPERATURE: 98 F | HEART RATE: 55 BPM | HEIGHT: 65 IN | WEIGHT: 250 LBS

## 2022-08-05 DIAGNOSIS — R07.9 CHEST PAIN: ICD-10-CM

## 2022-08-05 DIAGNOSIS — R07.9 CHEST PAIN, UNSPECIFIED TYPE: Primary | ICD-10-CM

## 2022-08-05 DIAGNOSIS — K21.9 GASTROESOPHAGEAL REFLUX DISEASE, UNSPECIFIED WHETHER ESOPHAGITIS PRESENT: ICD-10-CM

## 2022-08-05 LAB
ALBUMIN SERPL BCP-MCNC: 3.8 G/DL (ref 3.5–5)
ALBUMIN/GLOB SERPL: 1.2 {RATIO}
ALP SERPL-CCNC: 86 U/L (ref 41–108)
ALT SERPL W P-5'-P-CCNC: 28 U/L (ref 13–56)
ANION GAP SERPL CALCULATED.3IONS-SCNC: 13 MMOL/L (ref 7–16)
AST SERPL W P-5'-P-CCNC: 27 U/L (ref 15–37)
BASOPHILS # BLD AUTO: 0.05 K/UL (ref 0–0.2)
BASOPHILS NFR BLD AUTO: 0.6 % (ref 0–1)
BILIRUB SERPL-MCNC: 0.3 MG/DL (ref 0–1.2)
BUN SERPL-MCNC: 15 MG/DL (ref 7–18)
BUN/CREAT SERPL: 9 (ref 6–20)
CALCIUM SERPL-MCNC: 9 MG/DL (ref 8.5–10.1)
CHLORIDE SERPL-SCNC: 106 MMOL/L (ref 98–107)
CK SERPL-CCNC: 85 U/L (ref 26–192)
CO2 SERPL-SCNC: 24 MMOL/L (ref 21–32)
CREAT SERPL-MCNC: 1.69 MG/DL (ref 0.55–1.02)
DIFFERENTIAL METHOD BLD: ABNORMAL
EOSINOPHIL # BLD AUTO: 0.23 K/UL (ref 0–0.5)
EOSINOPHIL NFR BLD AUTO: 2.6 % (ref 1–4)
ERYTHROCYTE [DISTWIDTH] IN BLOOD BY AUTOMATED COUNT: 13.1 % (ref 11.5–14.5)
GLOBULIN SER-MCNC: 3.2 G/DL (ref 2–4)
GLUCOSE SERPL-MCNC: 104 MG/DL (ref 74–106)
HCT VFR BLD AUTO: 42.8 % (ref 38–47)
HGB BLD-MCNC: 13.9 G/DL (ref 12–16)
LYMPHOCYTES # BLD AUTO: 2.9 K/UL (ref 1–4.8)
LYMPHOCYTES NFR BLD AUTO: 33.4 % (ref 27–41)
MCH RBC QN AUTO: 31.3 PG (ref 27–31)
MCHC RBC AUTO-ENTMCNC: 32.5 G/DL (ref 32–36)
MCV RBC AUTO: 96.4 FL (ref 80–96)
MONOCYTES # BLD AUTO: 0.64 K/UL (ref 0–0.8)
MONOCYTES NFR BLD AUTO: 7.4 % (ref 2–6)
MPC BLD CALC-MCNC: 11.4 FL (ref 9.4–12.4)
NEUTROPHILS # BLD AUTO: 4.86 K/UL (ref 1.8–7.7)
NEUTROPHILS NFR BLD AUTO: 56 % (ref 53–65)
PLATELET # BLD AUTO: 261 K/UL (ref 150–400)
POTASSIUM SERPL-SCNC: 4 MMOL/L (ref 3.5–5.1)
PROT SERPL-MCNC: 7 G/DL (ref 6.4–8.2)
RBC # BLD AUTO: 4.44 M/UL (ref 4.2–5.4)
SODIUM SERPL-SCNC: 139 MMOL/L (ref 136–145)
TROPONIN I SERPL HS-MCNC: 9.8 PG/ML
TSH SERPL DL<=0.005 MIU/L-ACNC: 1.68 UIU/ML (ref 0.36–3.74)
WBC # BLD AUTO: 8.68 K/UL (ref 4.5–11)

## 2022-08-05 PROCEDURE — 80053 COMPREHEN METABOLIC PANEL: CPT | Performed by: FAMILY MEDICINE

## 2022-08-05 PROCEDURE — 85025 COMPLETE CBC W/AUTO DIFF WBC: CPT | Performed by: FAMILY MEDICINE

## 2022-08-05 PROCEDURE — 93010 EKG 12-LEAD: ICD-10-PCS | Mod: ,,, | Performed by: INTERNAL MEDICINE

## 2022-08-05 PROCEDURE — 36415 COLL VENOUS BLD VENIPUNCTURE: CPT | Performed by: FAMILY MEDICINE

## 2022-08-05 PROCEDURE — 25000003 PHARM REV CODE 250: Performed by: FAMILY MEDICINE

## 2022-08-05 PROCEDURE — 99284 EMERGENCY DEPT VISIT MOD MDM: CPT | Mod: ,,, | Performed by: FAMILY MEDICINE

## 2022-08-05 PROCEDURE — 93010 ELECTROCARDIOGRAM REPORT: CPT | Mod: ,,, | Performed by: INTERNAL MEDICINE

## 2022-08-05 PROCEDURE — 96375 TX/PRO/DX INJ NEW DRUG ADDON: CPT

## 2022-08-05 PROCEDURE — 99285 EMERGENCY DEPT VISIT HI MDM: CPT | Mod: 25

## 2022-08-05 PROCEDURE — 84484 ASSAY OF TROPONIN QUANT: CPT | Performed by: FAMILY MEDICINE

## 2022-08-05 PROCEDURE — 99284 PR EMERGENCY DEPT VISIT,LEVEL IV: ICD-10-PCS | Mod: ,,, | Performed by: FAMILY MEDICINE

## 2022-08-05 PROCEDURE — 84443 ASSAY THYROID STIM HORMONE: CPT | Performed by: FAMILY MEDICINE

## 2022-08-05 PROCEDURE — 96374 THER/PROPH/DIAG INJ IV PUSH: CPT

## 2022-08-05 PROCEDURE — 82550 ASSAY OF CK (CPK): CPT | Performed by: FAMILY MEDICINE

## 2022-08-05 PROCEDURE — 93005 ELECTROCARDIOGRAM TRACING: CPT

## 2022-08-05 PROCEDURE — 63600175 PHARM REV CODE 636 W HCPCS: Performed by: FAMILY MEDICINE

## 2022-08-05 RX ORDER — LIDOCAINE HYDROCHLORIDE 20 MG/ML
10 SOLUTION OROPHARYNGEAL
Status: COMPLETED | OUTPATIENT
Start: 2022-08-05 | End: 2022-08-05

## 2022-08-05 RX ORDER — DIPHENHYDRAMINE HCL 12.5MG/5ML
12.5 LIQUID (ML) ORAL
Status: COMPLETED | OUTPATIENT
Start: 2022-08-05 | End: 2022-08-05

## 2022-08-05 RX ORDER — MAG HYDROX/ALUMINUM HYD/SIMETH 200-200-20
30 SUSPENSION, ORAL (FINAL DOSE FORM) ORAL
Status: COMPLETED | OUTPATIENT
Start: 2022-08-05 | End: 2022-08-05

## 2022-08-05 RX ORDER — MORPHINE SULFATE 8 MG/ML
4 INJECTION INTRAMUSCULAR; INTRAVENOUS; SUBCUTANEOUS
Status: COMPLETED | OUTPATIENT
Start: 2022-08-05 | End: 2022-08-05

## 2022-08-05 RX ORDER — ONDANSETRON 2 MG/ML
4 INJECTION INTRAMUSCULAR; INTRAVENOUS
Status: COMPLETED | OUTPATIENT
Start: 2022-08-05 | End: 2022-08-05

## 2022-08-05 RX ORDER — FAMOTIDINE 20 MG/1
20 TABLET, FILM COATED ORAL 2 TIMES DAILY
Qty: 60 TABLET | Refills: 5 | Status: SHIPPED | OUTPATIENT
Start: 2022-08-05 | End: 2022-09-01

## 2022-08-05 RX ADMIN — ONDANSETRON 4 MG: 2 INJECTION INTRAMUSCULAR; INTRAVENOUS at 08:08

## 2022-08-05 RX ADMIN — LIDOCAINE HYDROCHLORIDE 10 ML: 20 SOLUTION ORAL; TOPICAL at 07:08

## 2022-08-05 RX ADMIN — DIPHENHYDRAMINE HYDROCHLORIDE 12.5 MG: 12.5 LIQUID ORAL at 07:08

## 2022-08-05 RX ADMIN — ALUMINUM HYDROXIDE, MAGNESIUM HYDROXIDE, AND SIMETHICONE 30 ML: 200; 200; 20 SUSPENSION ORAL at 07:08

## 2022-08-05 RX ADMIN — MORPHINE SULFATE 4 MG: 8 INJECTION INTRAVENOUS at 08:08

## 2022-08-05 NOTE — ED TRIAGE NOTES
Pt presents to ED with c/o of pain in the mid upper abdomen and sternal area that started during the night at some time. Pt states she woke up multiple times hurting. Pt has had a previous MI, but she states that her Protonix has been substituted for generic and she doesn't think it is working correctly.

## 2022-08-05 NOTE — ED PROVIDER NOTES
Encounter Date: 8/5/2022       History     Chief Complaint   Patient presents with    Chest Pain     PT REPORTS CHEST THIS MORNING. STATES WOKE UP A FEW TIMES TO GO TO THE BATHROOM. NOTICED PAIN WHEN SHE GOT UP . REPORTS PAIN MIDDLE OF CHEST TO HER STOMACH. REPORTS NO NAUSEA. NO VOMITING. NO DIARRHEA. NO DIAPHORESIS. PT DOES REPORT PAIN AS BURNING. HX OF MI AND STROKE.     The history is provided by the patient.     Review of patient's allergies indicates:   Allergen Reactions    Atorvastatin      LIPITOR    Dog dander      Dog hair & epithelia    House dust mite      D. Ptery, D. 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    COPD (chronic obstructive pulmonary disease)     Coronary artery disease     acute MI 02/2014    CRF (chronic renal failure)     GERD (gastroesophageal reflux disease)     Hyperlipidemia     Hypertension     Myocardial infarction     Nonrheumatic mitral (valve) insufficiency 05/21/2018    Sleep apnea 01/06/2020    Stroke     Vitamin D deficiency 07/31/2020     Past Surgical History:   Procedure Laterality Date    CORONARY ANGIOPLASTY WITH STENT PLACEMENT  02/07/2014    HYSTERECTOMY      RADIOFREQUENCY ABLATION Right 10/13/2021    Procedure: RADIOFREQUENCY ABLATION;  Surgeon: David Combs MD;  Location: [facility];  Service: Pain Management;  Laterality: Right;  Right SI RFTC     Family History   Problem Relation Age of Onset    Ovarian cancer Maternal Aunt     Arthritis Mother      Social History     Tobacco Use    Smoking status: Current Every Day Smoker     Packs/day: 1.00     Types: Cigarettes    Smokeless tobacco: Never Used     Tobacco comment: Smoker since age 16. Never had a CT of chest   Substance Use Topics    Alcohol use: Yes     Comment: social    Drug use: Never     Review of Systems   Constitutional: Positive for activity change. Negative for diaphoresis.   HENT: Negative for drooling.    Respiratory: Positive for shortness of breath.    Cardiovascular: Positive for chest pain. Negative for palpitations and leg swelling.   Genitourinary: Negative for flank pain.   Musculoskeletal: Positive for back pain.   Neurological: Negative for numbness.   Psychiatric/Behavioral: Negative for confusion.       Physical Exam     Initial Vitals [08/05/22 0740]   BP Pulse Resp Temp SpO2   (!) 144/104 82 (!) 23 97.9 °F (36.6 °C) 100 %      MAP       --         Physical Exam    Constitutional: She appears well-developed and well-nourished.   HENT:   Head: Normocephalic and atraumatic.   Eyes: Conjunctivae and EOM are normal. Pupils are equal, round, and reactive to light.   Neck:   Normal range of motion.  Cardiovascular: Normal rate and regular rhythm.   Abdominal: Abdomen is soft. Bowel sounds are normal.   Musculoskeletal:         General: Normal range of motion.      Cervical back: Normal range of motion.     Neurological: She is alert and oriented to person, place, and time. She has normal strength.   Skin: Skin is warm.   Psychiatric: She has a normal mood and affect. Her behavior is normal. Judgment and thought content normal.         Medical Screening Exam   See Full Note    ED Course   Procedures  Labs Reviewed   COMPREHENSIVE METABOLIC PANEL - Abnormal; Notable for the following components:       Result Value    Creatinine 1.69 (*)     eGFR 34 (*)     All other components within normal limits   CBC WITH DIFFERENTIAL - Abnormal; Notable for the following components:    MCV 96.4 (*)     MCH 31.3 (*)     Monocytes % 7.4 (*)     All other components within normal limits   TSH - Normal   TROPONIN I - Normal   CK - Normal   CBC W/ AUTO  DIFFERENTIAL    Narrative:     The following orders were created for panel order CBC auto differential.  Procedure                               Abnormality         Status                     ---------                               -----------         ------                     CBC with Differential[[id number]]        Abnormal            Final result                 Please view results for these tests on the individual orders.        ECG Results          EKG 12-lead (In process)  Result time 08/05/22 08:19:19    In process by Interface, Lab In [facility] (08/05/22 08:19:19)                 Narrative:    Test Reason : R07.9,    Vent. Rate : 081 BPM     Atrial Rate : 081 BPM     P-R Int : 134 ms          QRS Dur : 080 ms      QT Int : 388 ms       P-R-T Axes : 069 023 084 degrees     QTc Int : 450 ms    Normal sinus rhythm  Possible Inferior infarct ,age undetermined  ST and T wave abnormality, consider anterolateral ischemia  Abnormal ECG  No previous ECGs available    Referred By: [email or web address]   SELF           Confirmed By:                             Imaging Results          X-Ray Chest AP Portable (Final result)  Result time 08/05/22 08:24:23    Final result by Devendra Terrell DO (08/05/22 08:24:23)                 Impression:      No acute cardiopulmonary process demonstrated.    Point of Service: [facility]      Electronically signed by: Devendra Terrell  Date:    08/05/2022  Time:    08:24             Narrative:    EXAMINATION:  XR CHEST AP PORTABLE    CLINICAL HISTORY:  chest pain;    COMPARISON:  Chest x-ray May 26, 2022    TECHNIQUE:  Frontal view/views of the chest.    FINDINGS:  The cardiomediastinal silhouette is stable in configuration.  Chronic change of the lungs without focal consolidation, pleural effusion, or pneumothorax.  Visualized osseous and surrounding soft tissue structures appear grossly unchanged.                                 Medications   LIDOcaine HCl 2% oral solution 10 mL (10 mLs  Oral Given 8/5/22 0758)   aluminum-magnesium hydroxide-simethicone 200-200-20 mg/5 mL suspension 30 mL (30 mLs Oral Given 8/5/22 0759)   diphenhydrAMINE 12.5 mg/5 mL liquid 12.5 mg (12.5 mg Oral Given 8/5/22 0759)   morphine injection 4 mg (4 mg Intravenous Given 8/5/22 0804)   ondansetron injection 4 mg (4 mg Intravenous Given 8/5/22 0804)                       Clinical Impression:   Final diagnoses:  [R07.9] Chest pain  [R07.9] Chest pain, unspecified type (Primary)  [K21.9] Gastroesophageal reflux disease, unspecified whether esophagitis present          ED Disposition Condition    Discharge Stable        ED Prescriptions     None        Follow-up Information     Follow up With Specialties Details Why Contact Info    Katia Zepeda NP Family Medicine   [address]  [facility] [address]  [phone]      Katia Zepeda NP Family Medicine   [address]  [facility] [address]  [phone]             Fatuma Henson MD  08/05/22 0982

## 2022-08-05 NOTE — PROGRESS NOTES
Bun/cr sl elevated, increase fluids, triglycerides sl elevated, all oterh lab is good, repeat in 6 mnths

## 2022-08-07 ENCOUNTER — TELEPHONE (OUTPATIENT)
Dept: EMERGENCY MEDICINE | Facility: HOSPITAL | Age: 52
End: 2022-08-07
Payer: COMMERCIAL

## 2022-08-10 ENCOUNTER — CLINICAL SUPPORT (OUTPATIENT)
Dept: OTOLARYNGOLOGY | Facility: CLINIC | Age: 52
End: 2022-08-10
Payer: COMMERCIAL

## 2022-08-10 DIAGNOSIS — J30.81 ALLERGIC RHINITIS DUE TO DOGS: ICD-10-CM

## 2022-08-10 DIAGNOSIS — J30.89 ALLERGIC RHINITIS DUE TO HOUSE DUST MITE: ICD-10-CM

## 2022-08-10 DIAGNOSIS — J31.0 CHRONIC RHINITIS: Primary | ICD-10-CM

## 2022-08-10 DIAGNOSIS — J30.81 ALLERGIC RHINITIS DUE TO ANIMAL (CAT) (DOG) HAIR AND DANDER: ICD-10-CM

## 2022-08-10 DIAGNOSIS — J30.81 ALLERGIC RHINITIS DUE TO ANIMAL HAIR OR DANDER: ICD-10-CM

## 2022-08-10 DIAGNOSIS — J30.89 ALLERGIC RHINITIS DUE TO DERMATOPHAGOIDES FARINAE: ICD-10-CM

## 2022-08-10 DIAGNOSIS — J30.81 ALLERGY TO DOG DANDER: ICD-10-CM

## 2022-08-10 DIAGNOSIS — J30.81 ALLERGIC RHINITIS DUE TO DOG HAIR: ICD-10-CM

## 2022-08-10 DIAGNOSIS — J30.81 ALLERGIC TO DOGS: ICD-10-CM

## 2022-08-10 DIAGNOSIS — J30.1 ALLERGIC REACTION TO WEED POLLEN: ICD-10-CM

## 2022-08-10 DIAGNOSIS — J30.89 ALLERGIC RHINITIS DUE TO DERMATOPHAGOIDES PTERONYSSINUS: ICD-10-CM

## 2022-08-10 PROCEDURE — 95115 IMMUNOTHERAPY ONE INJECTION: CPT | Mod: PBBFAC | Performed by: OTOLARYNGOLOGY

## 2022-08-10 NOTE — PROGRESS NOTES
Established patient with Dr. Schmitz. See previous note for written order. Allergy injections per Dr. Schmitz.  20 minute mm reaction  2:23 PM   left arm; observation 3mm

## 2022-08-11 ENCOUNTER — HOSPITAL ENCOUNTER (OUTPATIENT)
Dept: RADIOLOGY | Facility: HOSPITAL | Age: 52
Discharge: HOME | End: 2022-08-11
Attending: ANESTHESIOLOGY
Payer: COMMERCIAL

## 2022-08-11 DIAGNOSIS — M25.551 RIGHT HIP PAIN: ICD-10-CM

## 2022-08-11 PROCEDURE — 73502 X-RAY EXAM HIP UNI 2-3 VIEWS: CPT | Mod: TC,RT

## 2022-08-15 DIAGNOSIS — M25.551 RIGHT HIP PAIN: Primary | ICD-10-CM

## 2022-08-17 ENCOUNTER — CLINICAL SUPPORT (OUTPATIENT)
Dept: OTOLARYNGOLOGY | Facility: CLINIC | Age: 52
End: 2022-08-17
Payer: COMMERCIAL

## 2022-08-17 DIAGNOSIS — J30.81 ALLERGIC RHINITIS DUE TO DOGS: ICD-10-CM

## 2022-08-17 DIAGNOSIS — J30.81 ALLERGIC RHINITIS DUE TO DOG HAIR: ICD-10-CM

## 2022-08-17 DIAGNOSIS — J30.81 ALLERGIC RHINITIS DUE TO ANIMAL (CAT) (DOG) HAIR AND DANDER: ICD-10-CM

## 2022-08-17 DIAGNOSIS — J30.1 ALLERGIC REACTION TO WEED POLLEN: ICD-10-CM

## 2022-08-17 DIAGNOSIS — J30.81 ALLERGIC RHINITIS DUE TO ANIMAL HAIR OR DANDER: ICD-10-CM

## 2022-08-17 DIAGNOSIS — J30.81 ALLERGIC TO DOGS: ICD-10-CM

## 2022-08-17 DIAGNOSIS — J31.0 CHRONIC RHINITIS: Primary | ICD-10-CM

## 2022-08-17 DIAGNOSIS — J30.81 ALLERGY TO DOG DANDER: ICD-10-CM

## 2022-08-17 DIAGNOSIS — J30.89 ALLERGIC RHINITIS DUE TO HOUSE DUST MITE: ICD-10-CM

## 2022-08-17 DIAGNOSIS — J30.89 ALLERGIC RHINITIS DUE TO DERMATOPHAGOIDES FARINAE: ICD-10-CM

## 2022-08-17 DIAGNOSIS — J30.89 ALLERGIC RHINITIS DUE TO DERMATOPHAGOIDES PTERONYSSINUS: ICD-10-CM

## 2022-08-17 PROCEDURE — 95115 IMMUNOTHERAPY ONE INJECTION: CPT | Mod: PBBFAC | Performed by: OTOLARYNGOLOGY

## 2022-08-17 NOTE — PROGRESS NOTES
Established patient with Dr. Schmitz. See previous note for written order. Allergy injections per Dr. Schmitz.  20 minute mm reaction  3:55 PM   left arm; observation 3mm

## 2022-08-31 ENCOUNTER — CLINICAL SUPPORT (OUTPATIENT)
Dept: FAMILY MEDICINE | Facility: CLINIC | Age: 52
End: 2022-08-31
Payer: COMMERCIAL

## 2022-08-31 ENCOUNTER — CLINICAL SUPPORT (OUTPATIENT)
Dept: REHABILITATION | Facility: HOSPITAL | Age: 52
End: 2022-08-31
Payer: COMMERCIAL

## 2022-08-31 DIAGNOSIS — Z76.89 ENCOUNTER FOR ALLERGY INJECTION: Primary | ICD-10-CM

## 2022-08-31 DIAGNOSIS — M25.551 RIGHT HIP PAIN: ICD-10-CM

## 2022-08-31 PROCEDURE — 97162 PT EVAL MOD COMPLEX 30 MIN: CPT

## 2022-08-31 PROCEDURE — 95115 IMMUNOTHERAPY ONE INJECTION: CPT | Mod: ,,, | Performed by: NURSE PRACTITIONER

## 2022-08-31 PROCEDURE — 95115 PR IMMUNOTHERAPY, ONE INJECTION: ICD-10-PCS | Mod: ,,, | Performed by: NURSE PRACTITIONER

## 2022-08-31 NOTE — PLAN OF CARE
[facility]  Physical Therapy Initial Evaluation    Name: Camila Morris  Clinic Number: [id number]    Therapy Diagnosis:   Encounter Diagnosis   Name Primary?    Right hip pain      Physician: David Combs MD    Physician Orders: PT Eval and Treat   Medical Diagnosis from Referral: M25.551 (ICD-10-CM) - Right hip pain   Evaluation Date: 8/31/2022  Plan of Care Expiration: Evaluation Only  Visit # / Visits authorized: 1/ 1    Time In: 2:47 PM  Time Out: 3:10 PM  Total Appointment Time (timed & untimed codes): 33 minutes    Precautions: Standard    Subjective   Date of onset: Chronic  History of current condition - CAMILA reports: having a triple nerve burn around October or November of last year for low back pain and states that it really helped for a while. She states that now however she has pain in her right hip towards her greater trochanter and in the middle of her buttcheek. She states that bending down makes matters worse. She states that at times she can not make any movement at all and the pain will occur.       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    Medications:   Camila has a current medication list which includes the following prescription(s): albuterol, amlodipine,  ascorbic acid (vitamin c), aspirin, budesonide-formoterol 160-4.5 mcg, cetirizine, cholecalciferol (vitamin d3), clopidogrel, epinephrine, ezetimibe, famotidine, famotidine, fluticasone propionate, gabapentin, hydrocodone-acetaminophen, ibuprofen, labetalol, losartan, multivitamin/iron/folic acid, mv-mn/fa/bl coh/isoflav/jujube, naproxen sodium, nitroglycerin, pantoprazole, repatha sureclick, and zinc gluconate.    Allergies:   Review of patient's allergies indicates:   Allergen Reactions    Atorvastatin      LIPITOR    Dog dander      Dog hair & epithelia    House dust mite      D. Ptery, D. Farinae    Levsin [hyoscyamine sulfate]     Nuts [tree nut]      BRAZIL NUTS    Pravastatin     Sulfa (sulfonamide antibiotics)     Weed pollen-short ragweed         Imaging, X:ray:     Pain:  Current 5/10, worst 8/10, best 3/10   Location: right hip and lower back   Description: Aching  Aggravating Factors: Standing and Bending  Easing Factors: pain medication    Objective     I    Range of motion:  Motion Right Left    Hip flexion  WITHIN FUNCTIONAL LIMITS  WITHIN FUNCTIONAL LIMITS   Hip extension  WITHIN FUNCTIONAL LIMITS  WITHIN FUNCTIONAL LIMITS   Hip abduction  WITHIN FUNCTIONAL LIMITS  WITHIN FUNCTIONAL LIMITS   Hip adduction  WITHIN FUNCTIONAL LIMITS  WITHIN FUNCTIONAL LIMITS   Internal rotation  WITHIN FUNCTIONAL LIMITS  WITHIN FUNCTIONAL LIMITS   External rotation  WITHIN FUNCTIONAL LIMITS  WITHIN FUNCTIONAL LIMITS   Knee extension  WITHIN FUNCTIONAL LIMITS  WITHIN FUNCTIONAL LIMITS   Knee flexion  WITHIN FUNCTIONAL LIMITS  WITHIN FUNCTIONAL LIMITS   Ankle DF  WITHIN FUNCTIONAL LIMITS  WITHIN FUNCTIONAL LIMITS   Ankle PF  WITHIN FUNCTIONAL LIMITS  WITHIN FUNCTIONAL LIMITS   Ankle Inversion  WITHIN FUNCTIONAL LIMITS  WITHIN FUNCTIONAL LIMITS   Ankle Eversion  WITHIN FUNCTIONAL LIMITS  WITHIN FUNCTIONAL LIMITS       Manual muscle test   BLE Gross MMT 4/5        Clinical Special Tests:  A. Hip  1. Leg length: right  WITHIN FUNCTIONAL LIMITS left WITHIN FUNCTIONAL LIMITS  2. Oscar test: right Negative left Negative  3. Hip scour test: right Positive left Negative  4. Piriformis test: right Negative left Negative  5. Goldie's test: right Negative left Negative      Comments:    Limitation/Restriction for FOTO  Survey    Therapist reviewed FOTO scores for Kirti Morris on 8/31/2022.   FOTO documents entered into [employer] - see Media section.    Limitation Score: 31%         Assessment   Kirti is a 52 y.o. female referred to outpatient Physical Therapy with a medical diagnosis of right hip pain. She presents with extreme pain during ambulation, walking, and with laying down on her right buttocks. She has chronic low back pain and presents with severe sciatica, however I do not feel as if she will benefit from skilled care due to the amount of pain she is in that will affect her ability to participate and perform the interventions and movements we will prescribe. Further medical intervention is suggested. An MRI is warranted for full assessment.    Plan   Evaluation Only    Bakari Davis, PT

## 2022-10-05 ENCOUNTER — HOSPITAL ENCOUNTER (OUTPATIENT)
Dept: RADIOLOGY | Facility: HOSPITAL | Age: 52
Discharge: HOME | End: 2022-10-05
Attending: ANESTHESIOLOGY
Payer: COMMERCIAL

## 2022-10-05 DIAGNOSIS — M54.17 L-S RADICULOPATHY: ICD-10-CM

## 2022-10-05 PROCEDURE — 72148 MRI LUMBAR SPINE W/O DYE: CPT | Mod: TC

## 2022-10-05 RX ORDER — DIAZEPAM 5 MG/1
5 TABLET ORAL ONCE
Qty: 1 TABLET | Refills: 0 | Status: SHIPPED | OUTPATIENT
Start: 2022-10-05 | End: 2023-08-23

## 2022-10-06 ENCOUNTER — CLINICAL SUPPORT (OUTPATIENT)
Dept: OTOLARYNGOLOGY | Facility: CLINIC | Age: 52
End: 2022-10-06
Payer: COMMERCIAL

## 2022-10-06 DIAGNOSIS — J30.89 ALLERGIC RHINITIS DUE TO HOUSE DUST MITE: ICD-10-CM

## 2022-10-06 DIAGNOSIS — J30.81 ALLERGIC TO DOGS: ICD-10-CM

## 2022-10-06 DIAGNOSIS — J30.81 ALLERGIC RHINITIS DUE TO ANIMAL (CAT) (DOG) HAIR AND DANDER: ICD-10-CM

## 2022-10-06 DIAGNOSIS — J31.0 CHRONIC RHINITIS: Primary | ICD-10-CM

## 2022-10-06 DIAGNOSIS — J30.81 ALLERGIC RHINITIS DUE TO DOGS: ICD-10-CM

## 2022-10-06 DIAGNOSIS — J30.89 ALLERGIC RHINITIS DUE TO DERMATOPHAGOIDES FARINAE: ICD-10-CM

## 2022-10-06 DIAGNOSIS — J30.1 ALLERGIC REACTION TO WEED POLLEN: ICD-10-CM

## 2022-10-06 DIAGNOSIS — J30.81 ALLERGY TO DOG DANDER: ICD-10-CM

## 2022-10-06 DIAGNOSIS — J30.89 ALLERGIC RHINITIS DUE TO DERMATOPHAGOIDES PTERONYSSINUS: ICD-10-CM

## 2022-10-06 DIAGNOSIS — J30.81 ALLERGIC RHINITIS DUE TO ANIMAL HAIR OR DANDER: ICD-10-CM

## 2022-10-06 DIAGNOSIS — J30.81 ALLERGIC RHINITIS DUE TO DOG HAIR: ICD-10-CM

## 2022-10-06 PROCEDURE — 95115 IMMUNOTHERAPY ONE INJECTION: CPT | Mod: PBBFAC | Performed by: OTOLARYNGOLOGY

## 2022-10-06 PROCEDURE — 95165 PR PROFES SVC,IMMUNOTHER,SINGLE/MULT AGS: ICD-10-PCS | Mod: S$PBB,,, | Performed by: OTOLARYNGOLOGY

## 2022-10-06 PROCEDURE — 95165 ANTIGEN THERAPY SERVICES: CPT | Mod: PBBFAC | Performed by: OTOLARYNGOLOGY

## 2022-10-06 PROCEDURE — 95165 ANTIGEN THERAPY SERVICES: CPT | Mod: S$PBB,,, | Performed by: OTOLARYNGOLOGY

## 2022-10-06 NOTE — PROGRESS NOTES
Established patient with Dr. Schmitz. See previous note for written order. Allergy injections per Dr. Schmitz.   Vial test administered from vial A.  10 minute vial test mm reactions  Administered left arm at 1:49 PM  3mm observed   Dose of 0.10ml given.  20 minute mm reaction  1:49 PM   left arm; observation 3mm

## 2022-10-13 ENCOUNTER — CLINICAL SUPPORT (OUTPATIENT)
Dept: FAMILY MEDICINE | Facility: CLINIC | Age: 52
End: 2022-10-13
Payer: COMMERCIAL

## 2022-10-13 DIAGNOSIS — Z76.89 ENCOUNTER FOR ALLERGY INJECTION: Primary | ICD-10-CM

## 2022-10-31 NOTE — PATIENT INSTRUCTIONS
Elevate, may alternate heat and cold to ankle several times per day, if not improved in 2 weeks, return to clinic for reeval with xray, take tyleno or motrin every 4-6 hours prn pain    Avoid irritants, meds as ordered, return [facility] as needed and as scheudled   Pre-op Diagnosis: Obstructive uropathy, neurogenic bladder    The above referenced H&P was reviewed by Romario De La Vega MD on 10/31/2022, the patient was examined and no significant changes have occurred in the patient's condition since the H&P was performed. I discussed with the patient and/or legal representative the potential benefits, risks and side effects of this procedure; the likelihood of the patient achieving goals; and potential problems that might occur during recuperation. I discussed reasonable alternatives to the procedure, including risks, benefits and side effects related to the alternatives and risks related to not receiving this procedure. We will proceed with procedure as planned.

## 2022-11-02 RX ORDER — AZITHROMYCIN 250 MG/1
TABLET, FILM COATED ORAL
Qty: 6 TABLET | Refills: 0 | Status: SHIPPED | OUTPATIENT
Start: 2022-11-02 | End: 2022-11-07

## 2022-11-02 NOTE — TELEPHONE ENCOUNTER
Pt called and stated her  has covid and has been in the hospital getting treatment.  Pt states she has tested positive for covid.  Request medication to help with symptoms.

## 2022-11-09 ENCOUNTER — OFFICE VISIT (OUTPATIENT)
Dept: FAMILY MEDICINE | Facility: CLINIC | Age: 52
End: 2022-11-09
Payer: COMMERCIAL

## 2022-11-09 VITALS
RESPIRATION RATE: 18 BRPM | WEIGHT: 230.63 LBS | HEIGHT: 65 IN | TEMPERATURE: 98 F | SYSTOLIC BLOOD PRESSURE: 104 MMHG | OXYGEN SATURATION: 97 % | HEART RATE: 73 BPM | BODY MASS INDEX: 38.42 KG/M2 | DIASTOLIC BLOOD PRESSURE: 62 MMHG

## 2022-11-09 DIAGNOSIS — E86.0 DEHYDRATION: ICD-10-CM

## 2022-11-09 DIAGNOSIS — U07.1 COVID: Primary | ICD-10-CM

## 2022-11-09 DIAGNOSIS — R05.1 ACUTE COUGH: ICD-10-CM

## 2022-11-09 PROCEDURE — 3078F PR MOST RECENT DIASTOLIC BLOOD PRESSURE < 80 MM HG: ICD-10-PCS | Mod: ,,, | Performed by: NURSE PRACTITIONER

## 2022-11-09 PROCEDURE — 3074F PR MOST RECENT SYSTOLIC BLOOD PRESSURE < 130 MM HG: ICD-10-PCS | Mod: ,,, | Performed by: NURSE PRACTITIONER

## 2022-11-09 PROCEDURE — 3008F BODY MASS INDEX DOCD: CPT | Mod: ,,, | Performed by: NURSE PRACTITIONER

## 2022-11-09 PROCEDURE — 99214 PR OFFICE/OUTPT VISIT, EST, LEVL IV, 30-39 MIN: ICD-10-PCS | Mod: ,,, | Performed by: NURSE PRACTITIONER

## 2022-11-09 PROCEDURE — 3008F PR BODY MASS INDEX (BMI) DOCUMENTED: ICD-10-PCS | Mod: ,,, | Performed by: NURSE PRACTITIONER

## 2022-11-09 PROCEDURE — 1159F PR MEDICATION LIST DOCUMENTED IN MEDICAL RECORD: ICD-10-PCS | Mod: ,,, | Performed by: NURSE PRACTITIONER

## 2022-11-09 PROCEDURE — 3074F SYST BP LT 130 MM HG: CPT | Mod: ,,, | Performed by: NURSE PRACTITIONER

## 2022-11-09 PROCEDURE — 4010F PR ACE/ARB THEARPY RXD/TAKEN: ICD-10-PCS | Mod: ,,, | Performed by: NURSE PRACTITIONER

## 2022-11-09 PROCEDURE — 3078F DIAST BP <80 MM HG: CPT | Mod: ,,, | Performed by: NURSE PRACTITIONER

## 2022-11-09 PROCEDURE — 1159F MED LIST DOCD IN RCRD: CPT | Mod: ,,, | Performed by: NURSE PRACTITIONER

## 2022-11-09 PROCEDURE — 99214 OFFICE O/P EST MOD 30 MIN: CPT | Mod: ,,, | Performed by: NURSE PRACTITIONER

## 2022-11-09 PROCEDURE — 4010F ACE/ARB THERAPY RXD/TAKEN: CPT | Mod: ,,, | Performed by: NURSE PRACTITIONER

## 2022-11-09 RX ORDER — FAMOTIDINE 20 MG/1
20 TABLET, FILM COATED ORAL 2 TIMES DAILY
COMMUNITY
Start: 2022-10-13 | End: 2023-02-01

## 2022-11-09 NOTE — PROGRESS NOTES
Katia Zepeda NP   [facility]  [address]  [place]     PATIENT NAME: Kirti Morris  : 1970  DATE: 22  MRN: [id number]      Billing Provider: Katia Zepeda NP  Level of Service:   Patient PCP Information       Provider PCP Type    Katia Zepeda NP General            Reason for Visit / Chief Complaint: Headache, Abdominal Pain, Fatigue, and Nausea       Update PCP  Update Chief Complaint         History of Present Illness / Problem Focused Workflow     Kirti Morris presents to the clinic c/o headache, abd pain, fatigue and nausea, tested positive for covid      Review of Systems     Review of Systems   Constitutional:  Positive for fatigue. Negative for chills and fever.   HENT:  Negative for nasal congestion, ear pain, facial swelling, hearing loss, mouth dryness, mouth sores, postnasal drip, rhinorrhea, sinus pressure/congestion and goiter.    Eyes:  Negative for discharge and itching.   Respiratory:  Negative for cough, shortness of breath and wheezing.    Cardiovascular:  Negative for chest pain and leg swelling.   Gastrointestinal:  Positive for abdominal pain and nausea. Negative for change in bowel habit and change in bowel habit.   Genitourinary:  Negative for difficulty urinating, dysuria, enuresis, frequency, hematuria and urgency.   Neurological:  Positive for headaches. Negative for dizziness, vertigo, syncope and weakness.   Psychiatric/Behavioral:  Negative for decreased concentration.    All other systems reviewed and are negative.     Medical / Social / Family History     Past Medical History:   Diagnosis Date    Angina at rest 2021    Anxiety     Arthritis     Asthma     Cerebral infarction 2021    Combined B12 and folate deficiency anemia 2020    COPD (chronic obstructive pulmonary disease)     Coronary artery disease     acute MI 2014    CRF (chronic renal failure)     GERD (gastroesophageal reflux disease)     Hyperlipidemia     Hypertension      Myocardial infarction     Nonrheumatic mitral (valve) insufficiency 05/21/2018    Sleep apnea 01/06/2020    Stroke     Vitamin D deficiency 07/31/2020       Past Surgical History:   Procedure Laterality Date    CORONARY ANGIOPLASTY WITH STENT PLACEMENT  02/07/2014    HYSTERECTOMY      RADIOFREQUENCY ABLATION Right 10/13/2021    Procedure: RADIOFREQUENCY ABLATION;  Surgeon: David Combs MD;  Location: [facility];  Service: Pain Management;  Laterality: Right;  Right SI RFTC       Social History  Ms.  reports that she has been smoking cigarettes. She has been smoking an average of 1 pack per day. She has never used smokeless tobacco. She reports current alcohol use. She reports that she does not use drugs.    Family History  Ms.'s family history includes Arthritis in her mother; Ovarian cancer in her maternal aunt.    Medications and Allergies     Medications  Outpatient Medications Marked as Taking for the 11/9/22 encounter (Office Visit) with Katia Zepeda NP   Medication Sig Dispense Refill    albuterol (PROVENTIL/VENTOLIN HFA) 90 mcg/actuation inhaler inhale TWO puffs by MOUTH twice daily FOR shortness of breath...do not exceed FOUR times PER DAY 18 g 0    amLODIPine (NORVASC) 10 MG tablet Take 1 tablet (10 mg total) by mouth once daily. 30 tablet 3    ascorbic acid, vitamin C, (VITAMIN C) 500 MG tablet 500 mg.      aspirin (ECOTRIN) 81 MG EC tablet Take 81 mg by mouth once daily.      budesonide-formoterol 160-4.5 mcg (SYMBICORT) 160-4.5 mcg/actuation HFAA Inhale 2 puffs into the lungs every 12 (twelve) hours. Controller - USES PRN. 10.2 g 3    cetirizine (ZYRTEC) 10 MG tablet Take 1 tablet (10 mg total) by mouth once daily. 30 tablet 3    cholecalciferol, vitamin D3, (VITAMIN D3) 250 mcg (10,000 unit) Cap TAKE ONE CAPSULE BY MOUTH ONE TIME DAILY 90 capsule 0    clopidogreL (PLAVIX) 75 mg tablet Take 1 tablet (75 mg total) by mouth once daily. 30 tablet 3    ezetimibe (ZETIA) 10 mg tablet  Take 10 mg by mouth once daily.      famotidine (PEPCID) 20 MG tablet Take 20 mg by mouth 2 (two) times daily.      fluticasone propionate (FLONASE) 50 mcg/actuation nasal spray 1 spray (50 mcg total) by Each Nostril route once daily. 15 g 0    gabapentin (NEURONTIN) 600 MG tablet TAKE ONE Tablet BY MOUTH THREE TIMES DAILY 90 tablet 1    HYDROcodone-acetaminophen (NORCO) 7.5-325 mg per tablet Take 1 tablet by mouth 2 (two) times daily as needed.      ibuprofen (ADVIL,MOTRIN) 600 MG tablet Take 600 mg by mouth every 8 (eight) hours as needed for Pain.      labetaloL (NORMODYNE) 200 MG tablet Take 1 tablet (200 mg total) by mouth 2 (two) times daily. 60 tablet 3    losartan (COZAAR) 50 MG tablet Take 1 tablet (50 mg total) by mouth once daily. 30 tablet 3    multivitamin/iron/folic acid (CENTRUM COMPLETE ORAL) Take by mouth Daily.      mv-mn/FA/bl coh/isoflav/jujube (ESTROVEN MENOPAUSE ORAL) Take by mouth once daily.      naproxen sodium (ANAPROX) 550 MG tablet Take 1 tablet (550 mg total) by mouth 2 (two) times daily with meals. 30 tablet 0    nitroGLYCERIN (NITROSTAT) 0.4 MG SL tablet Place 0.4 mg under the tongue every 5 (five) minutes as needed.      pantoprazole (PROTONIX) 40 MG tablet TAKE ONE Tablet BY MOUTH TWICE DAILY 60 tablet 3    REPATHA SURECLICK 140 mg/mL PnIj Inject 140 mg into the skin every 14 (fourteen) days.      tiZANidine (ZANAFLEX) 4 MG tablet TAKE ONE Tablet BY MOUTH EVERY 6 HOURS AS NEEDED FOR MUSCLE SPASMS / DO not take cyclobenzaprine while taking tizanidine 30 tablet 0    zinc gluconate 50 mg tablet Take 50 mg by mouth once daily. Takes as needed         Allergies  Review of patient's allergies indicates:   Allergen Reactions    Atorvastatin      LIPITOR    Dog dander      Dog hair & epithelia    House dust mite      D. Ptery, D. Farinae    Levsin [hyoscyamine sulfate]     Nuts [tree nut]      BRAZIL NUTS    Pravastatin     Sulfa (sulfonamide antibiotics)     Weed pollen-short ragweed   "      Physical Examination     Vitals:    11/09/22 1453   BP: 104/62   BP Location: Left arm   Patient Position: Sitting   Pulse: 73   Resp: 18   Temp: 97.8 °F (36.6 °C)   TempSrc: Oral   SpO2: 97%   Weight: 104.6 kg (230 lb 9.6 oz)   Height: 5' 5" (1.651 m)      Physical Exam  Vitals and nursing note reviewed.   Constitutional:       Appearance: Normal appearance.      Comments: Pt in clinic and wearing sunglasses, stated that she couldn't even get her clothes on today   HENT:      Head: Normocephalic.      Right Ear: Tympanic membrane, ear canal and external ear normal.      Left Ear: Tympanic membrane, ear canal and external ear normal.      Nose: Nose normal.      Mouth/Throat:      Mouth: Mucous membranes are dry.      Pharynx: Oropharynx is clear.   Eyes:      Extraocular Movements: Extraocular movements intact.      Conjunctiva/sclera: Conjunctivae normal.      Pupils: Pupils are equal, round, and reactive to light.   Cardiovascular:      Rate and Rhythm: Normal rate and regular rhythm.      Pulses: Normal pulses.      Heart sounds: Normal heart sounds.   Pulmonary:      Effort: Pulmonary effort is normal.      Breath sounds: Normal breath sounds.      Comments: Coughs at intervals  Abdominal:      General: Bowel sounds are normal.      Palpations: Abdomen is soft.   Musculoskeletal:         General: Normal range of motion.      Cervical back: Normal range of motion and neck supple.   Skin:     General: Skin is warm and dry.      Capillary Refill: Capillary refill takes less than 2 seconds.   Neurological:      General: No focal deficit present.      Mental Status: She is alert and oriented to person, place, and time.   Psychiatric:         Mood and Affect: Mood normal.         Behavior: Behavior normal.        Assessment and Plan (including Health Maintenance)      Problem List  Smart Sets  Document Outside HM   :    Plan: will have lily give 1000cc normal saline iv, return to clinic if needed    There " are no diagnoses linked to this encounter.        Health Maintenance Due   Topic Date Due    Hepatitis C Screening  Never done    Pneumococcal Vaccines (Age 0-64) (1 - PCV) Never done    HIV Screening  Never done    TETANUS VACCINE  Never done    High Dose Statin  Never done    Colorectal Cancer Screening  Never done    Shingles Vaccine (1 of 2) Never done    Mammogram  07/23/2022    Influenza Vaccine (1) 09/01/2022       Problem List Items Addressed This Visit    None        Health Maintenance Topics with due status: Not Due       Topic Last Completion Date    Lipid Panel 08/04/2022       Procedures     Future Appointments   Date Time Provider Department Center   3/20/2023  9:00 AM Katia Zepeda NP [facility]        No follow-ups on file.       Signature:  Katia Zepeda NP    Date of encounter: 11/9/22

## 2022-11-10 PROBLEM — E86.0 DEHYDRATION: Status: ACTIVE | Noted: 2022-11-10

## 2022-11-10 PROBLEM — U07.1 COVID: Status: ACTIVE | Noted: 2022-11-10

## 2022-12-19 NOTE — PROGRESS NOTES
Katia Zepeda NP   [facility]  [address] [facility] [address]  [place]     PATIENT NAME: Kirti Morris  : 1970  DATE: 22  MRN: [id number]      Billing Provider: Katia Zepeda NP  Level of Service:   Patient PCP Information     Provider PCP Type    Katia Zepeda NP General          Reason for Visit / Chief Complaint: Back Pain (Chronic back pain, but worse since last PM. Has taken muscle relaxers and used heating pad w/o relief. )       Update PCP  Update Chief Complaint         History of Present Illness / Problem Focused Workflow     Kirti Morris presents to the clinic c/o pain in left lower back, rates pain miesha 5 right now but at its worse is a 10, felt something pop n her back last night and has been hurting, flexeril and norco 7.5mg not helping      Review of Systems     Review of Systems   Constitutional: Negative for chills, fatigue and fever.   HENT: Negative for nasal congestion, ear pain, facial swelling, hearing loss, mouth dryness, mouth sores, postnasal drip, rhinorrhea, sinus pressure/congestion and goiter.    Eyes: Negative for discharge and itching.   Respiratory: Negative for cough, shortness of breath and wheezing.    Cardiovascular: Negative for chest pain and leg swelling.   Gastrointestinal: Negative for abdominal pain, change in bowel habit and change in bowel habit.   Genitourinary: Negative for difficulty urinating, dysuria, enuresis, frequency, hematuria and urgency.   Musculoskeletal: Positive for back pain.   Neurological: Negative for dizziness, vertigo, syncope, weakness and headaches.   Psychiatric/Behavioral: Negative for decreased concentration.        Medical / Social / Family History     Past Medical History:   Diagnosis Date    Angina at rest 2021    Anxiety     Arthritis     Asthma     Cerebral infarction 2021    Combined B12 and folate deficiency anemia 2020    COPD (chronic obstructive pulmonary disease)     Coronary artery disease      acute MI 02/2014    CRF (chronic renal failure)     GERD (gastroesophageal reflux disease)     Hyperlipidemia     Hypertension     Myocardial infarction     Nonrheumatic mitral (valve) insufficiency 05/21/2018    Sleep apnea 01/06/2020    Stroke     Vitamin D deficiency 07/31/2020       Past Surgical History:   Procedure Laterality Date    CORONARY ANGIOPLASTY WITH STENT PLACEMENT  02/07/2014    HYSTERECTOMY      RADIOFREQUENCY ABLATION Right 10/13/2021    Procedure: RADIOFREQUENCY ABLATION;  Surgeon: David Combs MD;  Location: [facility];  Service: Pain Management;  Laterality: Right;  Right SI RFTC       Social History  Ms.  reports that she has been smoking cigarettes. She has been smoking about 1.00 pack per day. She has never used smokeless tobacco. She reports current alcohol use. She reports that she does not use drugs.    Family History  Ms.'s family history includes Arthritis in her mother; Ovarian cancer in her maternal aunt.    Medications and Allergies     Medications  Outpatient Medications Marked as Taking for the 7/26/22 encounter (Office Visit) with Katia Zepeda NP   Medication Sig Dispense Refill    albuterol (VENTOLIN HFA) 90 mcg/actuation inhaler Inhale 2 puffs into the lungs 2 (two) times daily as needed (SOB). 2 puffs BID AS NEEDED. Not to exceed 4 times per day. 18 g 0    amLODIPine (NORVASC) 10 MG tablet Take 1 tablet (10 mg total) by mouth once daily. 30 tablet 3    ascorbic acid, vitamin C, (VITAMIN C) 500 MG tablet 500 mg.      aspirin (ECOTRIN) 81 MG EC tablet Take 81 mg by mouth once daily.      budesonide-formoterol 160-4.5 mcg (SYMBICORT) 160-4.5 mcg/actuation HFAA Inhale 2 puffs into the lungs every 12 (twelve) hours. Controller - USES PRN. 10.2 g 3    cetirizine (ZYRTEC) 10 MG tablet Take 1 tablet (10 mg total) by mouth once daily. 30 tablet 3    cholecalciferol, vitamin D3, (VITAMIN D3) 250 mcg (10,000 unit) Cap TAKE ONE CAPSULE BY MOUTH ONE  TIME DAILY 90 capsule 0    clopidogreL (PLAVIX) 75 mg tablet Take 1 tablet (75 mg total) by mouth once daily. 30 tablet 3    cyclobenzaprine (FLEXERIL) 10 MG tablet Take 1 tablet (10 mg total) by mouth 3 (three) times daily as needed for Muscle spasms. ONLY TAKES PRN 60 tablet 0    doxycycline (MONODOX) 100 MG capsule Take 1 capsule (100 mg total) by mouth 2 (two) times daily. 14 capsule 0    ezetimibe (ZETIA) 10 mg tablet Take 10 mg by mouth once daily.      famotidine (PEPCID) 20 MG tablet Take 20 mg by mouth Daily.      fluticasone propionate (FLONASE) 50 mcg/actuation nasal spray 1 spray (50 mcg total) by Each Nostril route once daily. 15 g 0    gabapentin (NEURONTIN) 600 MG tablet TAKE ONE TABLET BY MOUTH THREE TIMES DAILY 90 tablet 3    HYDROcodone-acetaminophen (NORCO) 7.5-325 mg per tablet Take 1 tablet by mouth 2 (two) times daily as needed.      ibuprofen (ADVIL,MOTRIN) 600 MG tablet Take 600 mg by mouth every 8 (eight) hours as needed for Pain.      labetaloL (NORMODYNE) 200 MG tablet Take 1 tablet (200 mg total) by mouth 2 (two) times daily. 60 tablet 3    losartan (COZAAR) 50 MG tablet Take 1 tablet (50 mg total) by mouth once daily. 30 tablet 3    multivitamin/iron/folic acid (CENTRUM COMPLETE ORAL) Take by mouth Daily.      mv-mn/FA/bl coh/isoflav/jujube (ESTROVEN MENOPAUSE ORAL) Take by mouth once daily.      nitroGLYCERIN (NITROSTAT) 0.4 MG SL tablet Place 0.4 mg under the tongue every 5 (five) minutes as needed.      pantoprazole (PROTONIX) 40 MG tablet TAKE ONE Tablet BY MOUTH TWICE DAILY 60 tablet 3    REPATHA SURECLICK 140 mg/mL PnIj Inject 140 mg into the skin every 14 (fourteen) days.      zinc gluconate 50 mg tablet Take 50 mg by mouth once daily. Takes as needed      [DISCONTINUED] fluconazole (DIFLUCAN) 150 MG Tab Take 150 mg by mouth once.       Current Facility-Administered Medications for the 7/26/22 encounter (Office Visit) with Katia Zepeda NP   Medication Dose  "Route Frequency Provider Last Rate Last Admin    dexamethasone injection 4 mg  4 mg Intramuscular 1 time in Clinic/HOD Katia Zepeda NP        ketorolac injection 60 mg  60 mg Intramuscular 1 time in Clinic/YAS Zepeda NP        methylPREDNISolone acetate injection 40 mg  40 mg Intramuscular 1 time in Clinic/YAS Zepeda NP        [DISCONTINUED] cefTRIAXone injection 1 g  1 g Intramuscular 1 time in Clinic/YAS Zepeda NP        [DISCONTINUED] dexamethasone injection 4 mg  4 mg Intramuscular 1 time in Clinic/HOD Katia Zepeda NP           Allergies  Review of patient's allergies indicates:   Allergen Reactions    Atorvastatin      LIPITOR    Levsin [hyoscyamine sulfate]     Nuts [tree nut]      BRAZIL NUTS    Pravastatin     Sulfa (sulfonamide antibiotics)        Physical Examination     Vitals:    07/26/22 1016   BP: 130/84   BP Location: Left arm   Patient Position: Sitting   Pulse: (!) 57   Resp: 20   Temp: 98.1 °F (36.7 °C)   TempSrc: Oral   SpO2: 99%   Weight: 108 kg (238 lb)   Height: 5' 5" (1.651 m)      Physical Exam  Constitutional:       Appearance: Normal appearance.   HENT:      Head: Normocephalic.      Right Ear: Tympanic membrane, ear canal and external ear normal.      Left Ear: Tympanic membrane, ear canal and external ear normal.      Nose: Nose normal.      Mouth/Throat:      Mouth: Mucous membranes are moist.      Pharynx: Oropharynx is clear.   Eyes:      Extraocular Movements: Extraocular movements intact.      Conjunctiva/sclera: Conjunctivae normal.      Pupils: Pupils are equal, round, and reactive to light.   Cardiovascular:      Rate and Rhythm: Normal rate and regular rhythm.      Pulses: Normal pulses.      Heart sounds: Normal heart sounds.   Pulmonary:      Effort: Pulmonary effort is normal.      Breath sounds: Normal breath sounds.   Abdominal:      General: Bowel sounds are normal.      Palpations: Abdomen is soft.   Musculoskeletal:         " General: Tenderness (muscle spasm noted in left lower back , leg lift positive at 30degrees, left. ) present. Normal range of motion.   Skin:     General: Skin is warm and dry.      Capillary Refill: Capillary refill takes less than 2 seconds.   Neurological:      General: No focal deficit present.      Mental Status: She is alert and oriented to person, place, and time.   Psychiatric:         Mood and Affect: Mood normal.         Behavior: Behavior normal.          Assessment and Plan (including Health Maintenance)      Problem List  Smart Sets  Document Outside HM   :    Plan: alternate heat and cold to back, avoid lifting more than 5 pounds for 1 week, meds as ordered, return to clinic in 1 week if needed    Back pain, unspecified back location, unspecified back pain laterality, unspecified chronicity  -     Cancel: POCT URINALYSIS W/O SCOPE    Acute left-sided low back pain without sciatica  -     dexamethasone injection 4 mg  -     methylPREDNISolone acetate injection 40 mg  -     ketorolac injection 60 mg  -     naproxen sodium (ANAPROX) 550 MG tablet; Take 1 tablet (550 mg total) by mouth 2 (two) times daily with meals.  Dispense: 30 tablet; Refill: 0  -     tiZANidine (ZANAFLEX) 4 MG tablet; Take 1 tablet (4 mg total) by mouth every 6 (six) hours as needed (muscle spasms). donot take flexeril while taking zanaflex  Dispense: 30 tablet; Refill: 0    Muscle spasm  -     tiZANidine (ZANAFLEX) 4 MG tablet; Take 1 tablet (4 mg total) by mouth every 6 (six) hours as needed (muscle spasms). donot take flexeril while taking zanaflex  Dispense: 30 tablet; Refill: 0            Health Maintenance Due   Topic Date Due    Hepatitis C Screening  Never done    Pneumococcal Vaccines (Age 0-64) (1 - PCV) Never done    HIV Screening  Never done    TETANUS VACCINE  Never done    Colorectal Cancer Screening  Never done    Shingles Vaccine (1 of 2) Never done    Mammogram  07/23/2022       Problem List Items Addressed  This Visit    None     Visit Diagnoses     Back pain, unspecified back location, unspecified back pain laterality, unspecified chronicity    -  Primary    Acute left-sided low back pain without sciatica        Relevant Medications    dexamethasone injection 4 mg (Start on 7/26/2022 11:15 AM)    methylPREDNISolone acetate injection 40 mg (Start on 7/26/2022 11:15 AM)    ketorolac injection 60 mg (Start on 7/26/2022 11:15 AM)    naproxen sodium (ANAPROX) 550 MG tablet    tiZANidine (ZANAFLEX) 4 MG tablet    Muscle spasm        Relevant Medications    tiZANidine (ZANAFLEX) 4 MG tablet            Health Maintenance Topics with due status: Not Due       Topic Last Completion Date    Influenza Vaccine 12/08/2021    Lipid Panel 03/17/2022       Procedures     Future Appointments   Date Time Provider Department Center   8/4/2022  8:45 AM Katia Zepeda NP [facility] [place]   3/20/2023  9:00 AM Katia Zepeda NP [facility] [facility] [place]        Follow up in about 1 week (around 8/2/2022), or if symptoms worsen or fail to improve.       Signature:  Katia Zepeda NP    Date of encounter: 7/26/22         [Fever] : fever [Nasal Congestion] : nasal congestion [Cough] : cough

## 2023-01-27 ENCOUNTER — TELEPHONE (OUTPATIENT)
Dept: SLEEP MEDICINE | Facility: CLINIC | Age: 53
End: 2023-01-27
Payer: COMMERCIAL

## 2023-01-30 ENCOUNTER — OFFICE VISIT (OUTPATIENT)
Dept: SLEEP MEDICINE | Facility: CLINIC | Age: 53
End: 2023-01-30
Payer: COMMERCIAL

## 2023-01-30 VITALS
BODY MASS INDEX: 40.62 KG/M2 | WEIGHT: 243.81 LBS | OXYGEN SATURATION: 98 % | SYSTOLIC BLOOD PRESSURE: 90 MMHG | HEART RATE: 70 BPM | HEIGHT: 65 IN | DIASTOLIC BLOOD PRESSURE: 61 MMHG

## 2023-01-30 DIAGNOSIS — G47.33 OSA ON CPAP: Primary | ICD-10-CM

## 2023-01-30 DIAGNOSIS — J44.9 CHRONIC OBSTRUCTIVE PULMONARY DISEASE, UNSPECIFIED COPD TYPE: ICD-10-CM

## 2023-01-30 DIAGNOSIS — I10 ESSENTIAL HYPERTENSION: ICD-10-CM

## 2023-01-30 PROCEDURE — 99202 OFFICE O/P NEW SF 15 MIN: CPT | Mod: S$PBB,,, | Performed by: FAMILY MEDICINE

## 2023-01-30 PROCEDURE — 3074F PR MOST RECENT SYSTOLIC BLOOD PRESSURE < 130 MM HG: ICD-10-PCS | Mod: ,,, | Performed by: FAMILY MEDICINE

## 2023-01-30 PROCEDURE — 1160F RVW MEDS BY RX/DR IN RCRD: CPT | Mod: ,,, | Performed by: FAMILY MEDICINE

## 2023-01-30 PROCEDURE — 3078F DIAST BP <80 MM HG: CPT | Mod: ,,, | Performed by: FAMILY MEDICINE

## 2023-01-30 PROCEDURE — 99202 PR OFFICE/OUTPT VISIT, NEW, LEVL II, 15-29 MIN: ICD-10-PCS | Mod: S$PBB,,, | Performed by: FAMILY MEDICINE

## 2023-01-30 PROCEDURE — 3074F SYST BP LT 130 MM HG: CPT | Mod: ,,, | Performed by: FAMILY MEDICINE

## 2023-01-30 PROCEDURE — 1159F MED LIST DOCD IN RCRD: CPT | Mod: ,,, | Performed by: FAMILY MEDICINE

## 2023-01-30 PROCEDURE — 3008F PR BODY MASS INDEX (BMI) DOCUMENTED: ICD-10-PCS | Mod: ,,, | Performed by: FAMILY MEDICINE

## 2023-01-30 PROCEDURE — 1159F PR MEDICATION LIST DOCUMENTED IN MEDICAL RECORD: ICD-10-PCS | Mod: ,,, | Performed by: FAMILY MEDICINE

## 2023-01-30 PROCEDURE — 99215 OFFICE O/P EST HI 40 MIN: CPT | Mod: PBBFAC | Performed by: FAMILY MEDICINE

## 2023-01-30 PROCEDURE — 3078F PR MOST RECENT DIASTOLIC BLOOD PRESSURE < 80 MM HG: ICD-10-PCS | Mod: ,,, | Performed by: FAMILY MEDICINE

## 2023-01-30 PROCEDURE — 3008F BODY MASS INDEX DOCD: CPT | Mod: ,,, | Performed by: FAMILY MEDICINE

## 2023-01-30 PROCEDURE — 1160F PR REVIEW ALL MEDS BY PRESCRIBER/CLIN PHARMACIST DOCUMENTED: ICD-10-PCS | Mod: ,,, | Performed by: FAMILY MEDICINE

## 2023-01-30 NOTE — PROGRESS NOTES
Patient presents to sleep clinic for cpap follow up.  Patient has new machine, but has not used it because she has not got a mask.  Patient wants to use [facility]. Patient uses a [facility]. ESS is 19

## 2023-01-30 NOTE — PROGRESS NOTES
Subjective:       Patient ID: Kirti Morris is a 52 y.o. female.    Chief Complaint: Sleep Apnea (Treating with cpap)    Patient is seen in sleep clinic for CPAP management but has not been seen since 2017.  The patient received a new CPAP December 2022 but has not started using the machine as she needs a new face mask and head gear.  I will write a prescription for new face mask and head gear and patient will follow-up with sleep clinic in 1 month.  I discussed the risk of not treating her ZAHEER as her initial AHI was 99 with a low O2 saturation of 84%.    Review of Systems   Constitutional:  Positive for fatigue.        Negative EDS   Respiratory:  Positive for apnea.    Psychiatric/Behavioral:  Positive for sleep disturbance.        Objective:      Physical Exam  Constitutional:       Appearance: She is obese.   Cardiovascular:      Rate and Rhythm: Normal rate and regular rhythm.      Heart sounds: No murmur heard.  Pulmonary:      Breath sounds: Normal breath sounds.   Skin:     General: Skin is warm and dry.   Neurological:      Mental Status: She is alert and oriented to person, place, and time.       Assessment:       Problem List Items Addressed This Visit          Pulmonary    Chronic obstructive pulmonary disease       Cardiac/Vascular    Essential hypertension       Other    ZAHEER on CPAP - Primary         Plan:       Continue CPAP @ 11 cm H20  Caution while operating a motor vehicle  Weight loss management  Prescription for new supplies  Follow up sleep clinic in 1 month.

## 2023-02-21 ENCOUNTER — OFFICE VISIT (OUTPATIENT)
Dept: FAMILY MEDICINE | Facility: CLINIC | Age: 53
End: 2023-02-21
Payer: COMMERCIAL

## 2023-02-21 VITALS
WEIGHT: 252 LBS | BODY MASS INDEX: 41.99 KG/M2 | RESPIRATION RATE: 17 BRPM | DIASTOLIC BLOOD PRESSURE: 62 MMHG | OXYGEN SATURATION: 98 % | HEIGHT: 65 IN | HEART RATE: 84 BPM | SYSTOLIC BLOOD PRESSURE: 124 MMHG

## 2023-02-21 DIAGNOSIS — G89.4 CHRONIC PAIN SYNDROME: ICD-10-CM

## 2023-02-21 DIAGNOSIS — F32.9 REACTIVE DEPRESSION (SITUATIONAL): ICD-10-CM

## 2023-02-21 DIAGNOSIS — F41.9 ANXIETY: Primary | ICD-10-CM

## 2023-02-21 PROBLEM — M54.50 ACUTE LEFT-SIDED LOW BACK PAIN WITHOUT SCIATICA: Status: RESOLVED | Noted: 2022-07-26 | Resolved: 2023-02-21

## 2023-02-21 PROBLEM — J01.01 ACUTE RECURRENT MAXILLARY SINUSITIS: Status: RESOLVED | Noted: 2022-05-09 | Resolved: 2023-02-21

## 2023-02-21 PROBLEM — R30.0 DYSURIA: Status: RESOLVED | Noted: 2022-03-17 | Resolved: 2023-02-21

## 2023-02-21 PROBLEM — R05.9 COUGH: Status: RESOLVED | Noted: 2022-04-26 | Resolved: 2023-02-21

## 2023-02-21 PROBLEM — J40 BRONCHITIS: Status: RESOLVED | Noted: 2022-04-26 | Resolved: 2023-02-21

## 2023-02-21 PROBLEM — R52 GENERALIZED BODY ACHES: Status: RESOLVED | Noted: 2022-04-11 | Resolved: 2023-02-21

## 2023-02-21 PROBLEM — M62.838 MUSCLE SPASM: Status: RESOLVED | Noted: 2022-07-26 | Resolved: 2023-02-21

## 2023-02-21 PROBLEM — M25.472 PAIN AND SWELLING OF ANKLE, LEFT: Status: RESOLVED | Noted: 2022-05-26 | Resolved: 2023-02-21

## 2023-02-21 PROBLEM — M25.572 PAIN AND SWELLING OF ANKLE, LEFT: Status: RESOLVED | Noted: 2022-05-26 | Resolved: 2023-02-21

## 2023-02-21 PROBLEM — R35.0 FREQUENCY OF MICTURITION: Status: RESOLVED | Noted: 2022-03-17 | Resolved: 2023-02-21

## 2023-02-21 PROBLEM — E86.0 DEHYDRATION: Status: RESOLVED | Noted: 2022-11-10 | Resolved: 2023-02-21

## 2023-02-21 PROBLEM — U07.1 COVID: Status: RESOLVED | Noted: 2022-11-10 | Resolved: 2023-02-21

## 2023-02-21 PROCEDURE — 3008F BODY MASS INDEX DOCD: CPT | Mod: ,,, | Performed by: NURSE PRACTITIONER

## 2023-02-21 PROCEDURE — 3074F PR MOST RECENT SYSTOLIC BLOOD PRESSURE < 130 MM HG: ICD-10-PCS | Mod: ,,, | Performed by: NURSE PRACTITIONER

## 2023-02-21 PROCEDURE — 3008F PR BODY MASS INDEX (BMI) DOCUMENTED: ICD-10-PCS | Mod: ,,, | Performed by: NURSE PRACTITIONER

## 2023-02-21 PROCEDURE — 3074F SYST BP LT 130 MM HG: CPT | Mod: ,,, | Performed by: NURSE PRACTITIONER

## 2023-02-21 PROCEDURE — 1159F MED LIST DOCD IN RCRD: CPT | Mod: ,,, | Performed by: NURSE PRACTITIONER

## 2023-02-21 PROCEDURE — 99214 PR OFFICE/OUTPT VISIT, EST, LEVL IV, 30-39 MIN: ICD-10-PCS | Mod: 25,,, | Performed by: NURSE PRACTITIONER

## 2023-02-21 PROCEDURE — 99214 OFFICE O/P EST MOD 30 MIN: CPT | Mod: 25,,, | Performed by: NURSE PRACTITIONER

## 2023-02-21 PROCEDURE — 3078F PR MOST RECENT DIASTOLIC BLOOD PRESSURE < 80 MM HG: ICD-10-PCS | Mod: ,,, | Performed by: NURSE PRACTITIONER

## 2023-02-21 PROCEDURE — 96372 THER/PROPH/DIAG INJ SC/IM: CPT | Mod: ,,, | Performed by: NURSE PRACTITIONER

## 2023-02-21 PROCEDURE — 1159F PR MEDICATION LIST DOCUMENTED IN MEDICAL RECORD: ICD-10-PCS | Mod: ,,, | Performed by: NURSE PRACTITIONER

## 2023-02-21 PROCEDURE — 96372 PR INJECTION,THERAP/PROPH/DIAG2ST, IM OR SUBCUT: ICD-10-PCS | Mod: ,,, | Performed by: NURSE PRACTITIONER

## 2023-02-21 PROCEDURE — 3078F DIAST BP <80 MM HG: CPT | Mod: ,,, | Performed by: NURSE PRACTITIONER

## 2023-02-21 RX ORDER — KETOROLAC TROMETHAMINE 30 MG/ML
60 INJECTION, SOLUTION INTRAMUSCULAR; INTRAVENOUS
Status: COMPLETED | OUTPATIENT
Start: 2023-02-21 | End: 2023-02-21

## 2023-02-21 RX ORDER — DULOXETIN HYDROCHLORIDE 30 MG/1
CAPSULE, DELAYED RELEASE ORAL
Qty: 45 CAPSULE | Refills: 0 | Status: SHIPPED | OUTPATIENT
Start: 2023-02-21 | End: 2023-05-23

## 2023-02-21 RX ADMIN — KETOROLAC TROMETHAMINE 60 MG: 30 INJECTION, SOLUTION INTRAMUSCULAR; INTRAVENOUS at 03:02

## 2023-02-21 NOTE — PROGRESS NOTES
Katia Zepeda NP   [facility]  [address] [address] [place]     PATIENT NAME: Kirti Morris  : 1970  DATE: 23  MRN: [id number]      Billing Provider: Katia Zepeda NP  Level of Service: DC OFFICE/OUTPT VISIT, EST, YEVGENIY IV, 30-39 MIN  Patient PCP Information       Provider PCP Type    Katia Zepeda NP General            Reason for Visit / Chief Complaint: Anxiety (Pt requesting medication to help with the emotional toll of her son being on a ventilator. She has taken a medication for depression before and  she did not do well on it.)       Update PCP  Update Chief Complaint         History of Present Illness / Problem Focused Workflow     Kirti Morris presents to the clinic anxiety, , pt requesting medication to help with the emotional toll of her step son being on a ventilator, she has take a medication for depression before and she did not do well on it      Review of Systems     Review of Systems   Musculoskeletal:         Chronic pain all over   Psychiatric/Behavioral:  The patient is nervous/anxious.       Medical / Social / Family History     Past Medical History:   Diagnosis Date    Angina at rest 2021    Anxiety     Arthritis     Asthma     Cerebral infarction 2021    Combined B12 and folate deficiency anemia 2020    COPD (chronic obstructive pulmonary disease)     Coronary artery disease     acute MI 2014    CRF (chronic renal failure)     GERD (gastroesophageal reflux disease)     Hyperlipidemia     Hypertension     Myocardial infarction     Nonrheumatic mitral (valve) insufficiency 2018    Sleep apnea 2020    Stroke     Vitamin D deficiency 2020       Past Surgical History:   Procedure Laterality Date    CORONARY ANGIOPLASTY WITH STENT PLACEMENT  2014    HYSTERECTOMY      RADIOFREQUENCY ABLATION Right 10/13/2021    Procedure: RADIOFREQUENCY ABLATION;  Surgeon: David Combs MD;  [facility]: [facility];  Service: Pain  Management;  Laterality: Right;  Right SI RFTC       Social History  Ms.  reports that she has been smoking cigarettes. She has been smoking an average of 1 pack per day. She has never used smokeless tobacco. She reports current alcohol use. She reports that she does not use drugs.    Family History  Ms.'s family history includes Arthritis in her mother; Ovarian cancer in her maternal aunt.    Medications and Allergies     Medications  Outpatient Medications Marked as Taking for the 2/21/23 encounter (Office Visit) with Katia Zepeda NP   Medication Sig Dispense Refill    albuterol (PROVENTIL) 2.5 mg /3 mL (0.083 %) nebulizer solution Take 3 mLs (2.5 mg total) by nebulization every 6 (six) hours as needed for Wheezing. Rescue 30 each 1    albuterol (PROVENTIL/VENTOLIN HFA) 90 mcg/actuation inhaler INHALE TWO PUFFS BY MOUTH TWICE DAILY FOR SHORTNESS OF BREATH / not more THAN FOUR TIMES DAILY 18 g 0    amLODIPine (NORVASC) 10 MG tablet Take 1 tablet (10 mg total) by mouth once daily. 30 tablet 3    ascorbic acid, vitamin C, (VITAMIN C) 500 MG tablet 500 mg.      aspirin (ECOTRIN) 81 MG EC tablet Take 81 mg by mouth once daily.      budesonide-formoterol 160-4.5 mcg (SYMBICORT) 160-4.5 mcg/actuation HFAA Inhale 2 puffs into the lungs every 12 (twelve) hours. Controller 10.2 g 0    cetirizine (ZYRTEC) 10 MG tablet Take 1 tablet (10 mg total) by mouth once daily. 90 tablet 1    cholecalciferol, vitamin D3, (VITAMIN D3) 250 mcg (10,000 unit) Cap TAKE ONE CAPSULE BY MOUTH ONE TIME DAILY 90 capsule 0    clopidogreL (PLAVIX) 75 mg tablet Take 1 tablet (75 mg total) by mouth once daily. 30 tablet 3    ezetimibe (ZETIA) 10 mg tablet Take 10 mg by mouth once daily.      famotidine (PEPCID) 20 MG tablet TAKE ONE Tablet BY MOUTH TWICE DAILY 60 tablet 5    fluticasone propionate (FLONASE) 50 mcg/actuation nasal spray 1 spray (50 mcg total) by Each Nostril route once daily. 15 g 0    gabapentin (NEURONTIN) 600 MG tablet TAKE ONE  Tablet BY MOUTH THREE TIMES DAILY 90 tablet 1    HYDROcodone-acetaminophen (NORCO) 7.5-325 mg per tablet Take 1 tablet by mouth 2 (two) times daily as needed.      labetaloL (NORMODYNE) 200 MG tablet Take 1 tablet (200 mg total) by mouth 2 (two) times daily. 60 tablet 3    losartan (COZAAR) 50 MG tablet Take 1 tablet (50 mg total) by mouth once daily. 30 tablet 3    multivitamin/iron/folic acid (CENTRUM COMPLETE ORAL) Take by mouth Daily.      mv-mn/FA/bl coh/isoflav/jujube (ESTROVEN MENOPAUSE ORAL) Take by mouth once daily.      nitroGLYCERIN (NITROSTAT) 0.4 MG SL tablet Place 0.4 mg under the tongue every 5 (five) minutes as needed.      pantoprazole (PROTONIX) 40 MG tablet TAKE ONE Tablet BY MOUTH TWICE DAILY 60 tablet 3    REPATHA SURECLICK 140 mg/mL PnIj Inject 140 mg into the skin every 14 (fourteen) days.      tiZANidine (ZANAFLEX) 4 MG tablet TAKE ONE Tablet BY MOUTH EVERY 6 HOURS AS NEEDED FOR MUSCLE SPASMS / DO not take cyclobenzaprine while taking tizanidine 30 tablet 0    zinc gluconate 50 mg tablet Take 50 mg by mouth once daily. Takes as needed       Current Facility-Administered Medications for the 2/21/23 encounter (Office Visit) with Katia Zepeda NP   Medication Dose Route Frequency Provider Last Rate Last Admin    [COMPLETED] ketorolac injection 60 mg  60 mg Intramuscular 1 time in Clinic/HOD Katia Zepeda NP   60 mg at 02/21/23 1514       Allergies  Review of patient's allergies indicates:   Allergen Reactions    Atorvastatin      LIPITOR    Dog dander      Dog hair & epithelia    House dust mite      D. Ptery, D. Farinae    Levsin [hyoscyamine sulfate]     Nuts [tree nut]      BRAZIL NUTS    Pravastatin     Sulfa (sulfonamide antibiotics)     Weed pollen-short ragweed        Physical Examination     Vitals:    02/21/23 1414   BP: 124/62   BP Location: Right arm   Patient Position: Sitting   BP Method: Small (Manual)   Pulse: 84   Resp: 17   SpO2: 98%   Weight: 114.3 kg (252 lb)   Height: 5'  "5" (1.651 m)      Physical Exam  Vitals and nursing note reviewed.   Constitutional:       Appearance: Normal appearance. She is obese.   HENT:      Head: Normocephalic.      Right Ear: Tympanic membrane, ear canal and external ear normal.      Left Ear: Tympanic membrane, ear canal and external ear normal.      Nose: Nose normal.      Mouth/Throat:      Mouth: Mucous membranes are moist.      Pharynx: Oropharynx is clear.   Eyes:      Extraocular Movements: Extraocular movements intact.      Conjunctiva/sclera: Conjunctivae normal.      Pupils: Pupils are equal, round, and reactive to light.   Cardiovascular:      Rate and Rhythm: Normal rate and regular rhythm.      Pulses: Normal pulses.      Heart sounds: Normal heart sounds.   Pulmonary:      Effort: Pulmonary effort is normal.      Breath sounds: Normal breath sounds.   Abdominal:      General: Bowel sounds are normal.      Palpations: Abdomen is soft.   Musculoskeletal:         General: Normal range of motion.      Comments: C/o chronic pain all over, rates pain miesha 8   Skin:     General: Skin is warm and dry.      Capillary Refill: Capillary refill takes less than 2 seconds.   Neurological:      General: No focal deficit present.      Mental Status: She is alert and oriented to person, place, and time.   Psychiatric:         Mood and Affect: Mood normal.         Behavior: Behavior normal.        Assessment and Plan (including Health Maintenance)      Problem List  Smart Sets  Document Outside HM   :    Plan:     Anxiety  -     DULoxetine (CYMBALTA) 30 MG capsule; 1 capsule daily x 2 weeks then may increase to 2 po every am if needed  Dispense: 45 capsule; Refill: 0    Chronic pain syndrome  -     ketorolac injection 60 mg  -     DULoxetine (CYMBALTA) 30 MG capsule; 1 capsule daily x 2 weeks then may increase to 2 po every am if needed  Dispense: 45 capsule; Refill: 0    Reactive depression (situational)  -     DULoxetine (CYMBALTA) 30 MG capsule; 1 capsule " daily x 2 weeks then may increase to 2 po every am if needed  Dispense: 45 capsule; Refill: 0            Health Maintenance Due   Topic Date Due    Hepatitis C Screening  Never done    COVID-19 Vaccine (1) Never done    Pneumococcal Vaccines (Age 0-64) (1 - PCV) Never done    HIV Screening  Never done    TETANUS VACCINE  Never done    High Dose Statin  Never done    Hemoglobin A1c (Diabetic Prevention Screening)  Never done    Colorectal Cancer Screening  Never done    Shingles Vaccine (1 of 2) Never done    Mammogram  07/23/2022    Influenza Vaccine (1) 09/01/2022       Problem List Items Addressed This Visit          Neuro    Chronic pain syndrome    Current Assessment & Plan     Cont meds as ordered, keep all miesha with  Pain mgt. Return to clini c as needed         Relevant Medications    ketorolac injection 60 mg (Completed)    DULoxetine (CYMBALTA) 30 MG capsule       Psychiatric    Anxiety - Primary    Current Assessment & Plan     meds as ordered, return to clinic as needed,          Relevant Medications    DULoxetine (CYMBALTA) 30 MG capsule    Reactive depression (situational)    Relevant Medications    DULoxetine (CYMBALTA) 30 MG capsule         Health Maintenance Topics with due status: Not Due       Topic Last Completion Date    Lipid Panel 08/04/2022       Procedures     Future Appointments   Date Time Provider Department Center   3/20/2023  9:00 AM [facility]epeda [facility] [place]   4/17/2023  1:15 PM Alonso Clark DO [facility] [place]        Follow up in about 3 weeks (around 3/14/2023).       Signature:  Katia Zepeda NP    Date of encounter: 2/21/23

## 2023-02-22 DIAGNOSIS — M54.17 LUMBOSACRAL RADICULOPATHY: Primary | ICD-10-CM

## 2023-03-02 ENCOUNTER — CLINICAL SUPPORT (OUTPATIENT)
Dept: REHABILITATION | Facility: HOSPITAL | Age: 53
End: 2023-03-02
Payer: COMMERCIAL

## 2023-03-02 DIAGNOSIS — M54.17 LUMBOSACRAL RADICULOPATHY: ICD-10-CM

## 2023-03-02 PROCEDURE — 97162 PT EVAL MOD COMPLEX 30 MIN: CPT

## 2023-03-02 NOTE — PLAN OF CARE
[facility]   Physical Therapy Initial Evaluation    Name: Kirti Morris  Clinic Number: [id number]    Therapy Diagnosis:   Encounter Diagnosis   Name Primary?    Lumbosacral radiculopathy      Physician: David Combs MD    Physician Orders: PT Eval and Treat Lumbosacral radiculopathy  Medical Diagnosis from Referral: Lumbosacral radiculopathy M54.17  Evaluation Date: 3/2/2023  Authorization Period Expiration: 4/13/2023  Plan of Care Expiration: 4/13/2023  Visit # / Visits authorized: 1/ 12    Time In: 1325  Time Out: 1415  Total Appointment Time (timed & untimed codes): 50 minutes    Precautions: Standard    Subjective   Date of onset: February 2023  History of current condition - KIRTI reports: pain in B L-S region and R hip.  Pt reports that she had a terrible night last night, but 2 days ago she was doing really good and even felt like doing some cooking.  Pt reports that she can tell her pain gets worse with rainy weather like today.     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    Medications:   Kirti has a current medication list which includes the following prescription(s): albuterol, albuterol, amlodipine, ascorbic acid (vitamin c), aspirin, budesonide-formoterol  160-4.5 mcg, cetirizine, cholecalciferol (vitamin d3), clopidogrel, diazepam, duloxetine, epinephrine, ezetimibe, famotidine, fluticasone propionate, gabapentin, hydrocodone-acetaminophen, ibuprofen, labetalol, losartan, multivitamin/iron/folic acid, mv-mn/fa/bl coh/isoflav/jujube, naproxen sodium, nitroglycerin, pantoprazole, repatha sureclick, tizanidine, and zinc gluconate.    Allergies:   Review of patient's allergies indicates:   Allergen Reactions    Atorvastatin      LIPITOR    Dog dander      Dog hair & epithelia    House dust mite      D. Ptery, D. 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    Pain:  Current 6/10, worst 8/10, best 4/10   Location: bilateral back   Description: Grabbing, Pulling  Aggravating Factors: Sitting, Standing, Walking, and Night Time  Easing Factors: pain medication, heating pad, and rest    Pts goals: Improvement in pain and core strength    Objective     Posture:  Standing lordosis: Decreased  Sitting lordosis: Decreased  Iliac crest height:  equal  PSIS height:  equal  Pelvic rotation/torsion: no  Scoliosis: no  Lateral shift: no  Comments:    Forward bend: 51   Back bend: 15  Lateral trunk lean: right 24 left 17  Trunk rotation: right 18 left 32    MANUAL MUSCLE TEST  Right left   Hip flexion 4/5 4+/5   Hip abduction 4+/5 4+/5   Knee extension 4+/5 4+/5   Knee flexion  4/5 4+/5   Ankle dorsiflexion 4+/5 4+/5   Ankle plantar flexion  4+/5 4+/5       ROM/flexibility right left   Hip flexion (120) WFL WFL   Internal rotation (45) WFL WFL   External rotation (45) WFL WFL   Hamstring 90/90 (-10) WFL WFL       Special test  Right  Left    SLR test < 60 degrees negative negative   SLR test > 60 degrees negative negative   Sitting slump test negative negative   Piriformis test positive negative   PRETTY test negative negative     Gait assessment:   Step length: decreased   Step width: WNL   Loni: decreased   Antalgic gait: yes  Assistive device: none      Palpation:  Moderate palpable tenderness R hip      Limitation/Restriction for FOTO low back/hip Survey    Therapist reviewed FOTO scores for Camila Morris on 3/2/2023.   FOTO documents entered into [employer] - see Media section.    Limitation Score: 45%         TREATMENT   Home Exercises and Patient Education Provided    Education provided:   - next visit    Written Home Exercises Provided:  next visit .  Exercises were reviewed and CAMILA was able to demonstrate them prior to the end of the session.  CAMILA demonstrated good  understanding of the education provided.     See EMR under Media for exercises provided  next visit .    Assessment   Camila is a 52 y.o. female referred to outpatient Physical Therapy with a medical diagnosis of lumbosacral radiculopathy. Pt presents with decreased strength, pain, tenderness, impaired gait, impaired function.    Pt prognosis is Excellent.   Pt will benefit from skilled outpatient Physical Therapy to address the deficits stated above and in the chart below, provide pt/family education, and to maximize pt's level of independence.     Plan of care discussed with patient: Yes  Pt's spiritual, cultural and educational needs considered and patient is agreeable to the plan of care and goals as stated below:     Anticipated Barriers for therapy: none      Goals:  Patient will report decrease in pain to 0/10 to improve quality of life  Patient will increase overall FOTO score by 12 points or more demonstrating decreased difficulty with functional activities.   Patient will increase lower extremity and core strength to 4+ to 5/5 to improve ambulation  ability.  Patient will demonstrate no palpable tenderness in R hip.  Patient will demonstrate independence with home exercise program.    Plan   Plan of care Certification: 3/2/2023 to 4/13/2023.    Outpatient Physical Therapy 2 times weekly for 6 weeks to include the following interventions: Electrical Stimulation IFC, Patient Education, and Therapeutic Exercise.     Eli Boles, PT

## 2023-03-06 ENCOUNTER — CLINICAL SUPPORT (OUTPATIENT)
Dept: REHABILITATION | Facility: HOSPITAL | Age: 53
End: 2023-03-06
Payer: COMMERCIAL

## 2023-03-06 DIAGNOSIS — M54.17 LUMBOSACRAL RADICULOPATHY: Primary | ICD-10-CM

## 2023-03-06 PROCEDURE — 97110 THERAPEUTIC EXERCISES: CPT | Mod: CQ

## 2023-03-06 NOTE — PROGRESS NOTES
"  Physical Therapy Treatment Note     Name: Camila Morris  Clinic Number: [id number]    Therapy Diagnosis: No diagnosis found.  Physician: David Combs MD    Visit Date: 3/6/2023    Physician Orders: PT Eval and Treat Lumbosacral radiculopathy  Medical Diagnosis from Referral: Lumbosacral radiculopathy M54.17  Evaluation Date: 3/2/2023  Authorization Period Expiration: 4/13/2023  Plan of Care Expiration: 4/13/2023  Plan of care Certification: 3/2/2023 to 4/13/2023.   Updated Plan of Care Due:   Visit # / Visits authorized: 2/ 12      PTA Visit #: 1    Time In: 1320 PM  Time Out: 1400 PM  Total Billable Time:  40   minutes    Precautions: Standard      Subjective     Pt reports: "sharp shooting back pain on the right".  She was compliant with home exercise program.      Pain: 5/10   Location: bilateral back      Objective     CAMILA received therapeutic exercises to develop strength, endurance, ROM, flexibility, and posture for   40  minutes including:    Sci Fit Stepper x 7 min   Bridging 1x15 with 3 sec holds  Pelvic tilts 1x10  Trunk rotation 1x10 with swiss ball  SLRs 1x 10   Seated forward ball rolls 5 x 5 sec holds  Cat/cow 1x10  Prayer stretch 5x5 sec each  SKTC 10 x 5 sec each  DKTC attempted, but not able to complete today  Piriformis stretching 3x10 sec each    HEP education and demonstration x 5 min         (Not This Visit)  CAMILA received the following supervised modalities after being cleared for contradictions: IFC Electrical Stimulation:  Camila received IFC Electrical Stimulation for pain control applied to the bilateral lumbar spine. Pt received stimulation at 100 % scan at a frequency of        for       minutes. Camila tolderated treatment well without any adverse effects.      CAMILA received hot pack for     minutes to     .    CAMILA received cold pack for      minutes to     .      Home Exercises Provided and Patient Education Provided     Education provided: yes, handout    Written " Home Exercises Provided: yes.  Exercises were reviewed and CAMILA was able to demonstrate them prior to the end of the session.  CAMILA demonstrated good  understanding of the education provided.     See EMR under Media for exercises provided 3/6/2023.    Assessment     Pt tolerated Tx well. PTA provided HEP education and demonstration with pt presenting understanding.     CAMILA Is progressing well towards her goals.   Pt prognosis is Excellent.     Pt will continue to benefit from skilled outpatient physical therapy to address the deficits listed in the problem list box on initial evaluation, provide pt/family education and to maximize pt's level of independence in the home and community environment.     Pt's spiritual, cultural and educational needs considered and pt agreeable to plan of care and goals.     Anticipated barriers to physical therapy: none  Goals:  Patient will report decrease in pain to 0/10 to improve quality of life  Patient will increase overall FOTO score by 12 points or more demonstrating decreased difficulty with functional activities.   Patient will increase lower extremity and core strength to 4+ to 5/5 to improve ambulation ability.  Patient will demonstrate no palpable tenderness in R hip.  Patient will demonstrate independence with home exercise program.       Plan     Pt will continue with POC.    Jazmyn Nagy, SYLVIA  3/6/2023

## 2023-03-08 ENCOUNTER — CLINICAL SUPPORT (OUTPATIENT)
Dept: REHABILITATION | Facility: HOSPITAL | Age: 53
End: 2023-03-08
Payer: COMMERCIAL

## 2023-03-08 DIAGNOSIS — M54.17 LUMBOSACRAL RADICULOPATHY: Primary | ICD-10-CM

## 2023-03-08 PROCEDURE — 97110 THERAPEUTIC EXERCISES: CPT

## 2023-03-08 PROCEDURE — 97014 ELECTRIC STIMULATION THERAPY: CPT

## 2023-03-08 NOTE — PROGRESS NOTES
Physical Therapy Treatment Note     Name: Camila Morris  Clinic Number: [id number]    Therapy Diagnosis:   Encounter Diagnosis   Name Primary?    Lumbosacral radiculopathy Yes     Physician: David Combs MD    Visit Date: 3/8/2023    Physician Orders: PT Eval and Treat Lumbosacral radiculopathy  Medical Diagnosis from Referral: Lumbosacral radiculopathy M54.17  Evaluation Date: 3/2/2023  Authorization Period Expiration: 4/13/2023  Plan of Care Expiration: 4/13/2023  Plan of care Certification: 3/2/2023 to 4/13/2023.   Updated Plan of Care Due:   Visit # / Visits authorized: 3/ 12      PTA Visit #: 0    Time In: 1320   Time Out: 1415   Total Billable Time:  55 minutes    Precautions: Standard      Subjective     Pt reports: Pain in R hip and across lower back.  She was compliant with home exercise program.      Pain: 5/10   Location: bilateral back      Objective     CAMILA received therapeutic exercises to develop strength, endurance, ROM, flexibility, and posture for 40 minutes including:    Sci Fit Stepper x 7 min   Bridging 1x15 with 3 sec holds  Pelvic tilts 1x10  Trunk rotation 1x10  Seated forward ball rolls 5 x 10 sec holds  Cat/cow 1x10 (not this visit)  Prayer stretch 5x5 sec each (not this visit)  SKTC 10 x 5 sec each  Piriformis stretching 5x10 sec each   SLR 1x 10 (attempted but painful)  Sidelying hip abd 1x10 (unable with L LE)  Clamshell 1x10    CAMILA received the following supervised modalities after being cleared for contradictions: IFC Electrical Stimulation:  Camila received IFC Electrical Stimulation for pain control applied to the bilateral lumbar spine. Pt received stimulation at 100 % scan at a frequency of 18 V/CV  for 15     minutes.  Camila tolerated treatment well without any adverse effects.      CAMILA received hot pack for 15 minutes to R hip/piriformis region simultaneously with IFC.    (Not this visit)CAMILA received cold pack for      minutes to     .      Home Exercises  Provided and Patient Education Provided     Education provided: yes, handout    Written Home Exercises Provided: yes.  Exercises were reviewed and CAMILA was able to demonstrate them prior to the end of the session.  CAMILA demonstrated good  understanding of the education provided.     See EMR under Media for exercises provided 3/6/2023.    Assessment     Pt tolerated Tx well with decreased pain to 3/10 following treatment. P.T. provided HEP education and demonstration with pt presenting understanding.     CAMILA Is progressing well towards her goals.   Pt prognosis is Excellent.     Pt will continue to benefit from skilled outpatient physical therapy to address the deficits listed in the problem list box on initial evaluation, provide pt/family education and to maximize pt's level of independence in the home and community environment.     Pt's spiritual, cultural and educational needs considered and pt agreeable to plan of care and goals.     Anticipated barriers to physical therapy: none  Goals:  Patient will report decrease in pain to 0/10 to improve quality of life  Patient will increase overall FOTO score by 12 points or more demonstrating decreased difficulty with functional activities.   Patient will increase lower extremity and core strength to 4+ to 5/5 to improve ambulation ability.  Patient will demonstrate no palpable tenderness in R hip.  Patient will demonstrate independence with home exercise program.       Plan     Pt will continue with POC.    Eli Boles, PT  3/8/2023

## 2023-03-13 ENCOUNTER — CLINICAL SUPPORT (OUTPATIENT)
Dept: REHABILITATION | Facility: HOSPITAL | Age: 53
End: 2023-03-13
Payer: COMMERCIAL

## 2023-03-13 DIAGNOSIS — M54.17 LUMBOSACRAL RADICULOPATHY: Primary | ICD-10-CM

## 2023-03-13 PROCEDURE — 97110 THERAPEUTIC EXERCISES: CPT | Mod: CQ

## 2023-03-13 PROCEDURE — 97140 MANUAL THERAPY 1/> REGIONS: CPT | Mod: CQ

## 2023-03-13 NOTE — PLAN OF CARE
Reasons for Recertification of Therapy: Additional interventions needed to address pain and muscular tightness.    Plan     Updated Certification Period: 3/13/2023 to 4/13/2023  Recommended Treatment Plan: 2 times per week for 6 weeks: Electrical Stimulation IFC, Manual Therapy, Moist Heat/ Ice, Patient Education, Therapeutic Exercise, and Ultrasound      Eli Boles, PT  3/13/2023      I CERTIFY THE NEED FOR THESE SERVICES FURNISHED UNDER THIS PLAN OF TREATMENT AND WHILE UNDER MY CARE.    Physician's comments:      Physician's Signature: ___________________________________________________

## 2023-03-13 NOTE — PROGRESS NOTES
Physical Therapy Treatment Note     Name: Camila Morris  Clinic Number: [id number]    Therapy Diagnosis:   Encounter Diagnosis   Name Primary?    Lumbosacral radiculopathy Yes     Physician: David Combs MD    Visit Date: 3/13/2023    Physician Orders: PT Eval and Treat Lumbosacral radiculopathy  Medical Diagnosis from Referral: Lumbosacral radiculopathy M54.17  Evaluation Date: 3/2/2023  Authorization Period Expiration: 4/13/2023  Plan of Care Expiration: 4/13/2023  Plan of care Certification: 3/2/2023 to 4/13/2023.   Updated Plan of Care Due:   Visit # / Visits authorized: 4/ 12      PTA Visit #: 1    Time In: 1320  Time Out: 1400  Total Billable Time:   40   minutes    Precautions: Standard      Subjective     Pt reports: Pain in R posterior/lower hip  She was compliant with home exercise program.      Pain: 5/10   Location: bilateral back      Objective     CAMILA received therapeutic exercises to develop strength, endurance, ROM, flexibility, and posture for   30  minutes including:    Sci Fit Stepper x 7 min   Bridging 1x15 with 3 sec holds  Pelvic tilts 1x10 with 3 sec holds  Trunk rotation 1x10 x 3 sec  Seated forward ball rolls 5 x 10 sec holds  Cat/cow 1x10 (not this visit)  Prayer stretch 5x5 sec each (not this visit)  SKTC 10 x 5 sec each   Piriformis stretching 5x10 sec each   SLR 1x 10 (attempted but painful)  Sidelying hip abd 1x10   Clamshell 2 x10    (Not This Visit)   CAMILA received the following supervised modalities after being cleared for contradictions: IFC Electrical Stimulation:  Camila received IFC Electrical Stimulation for pain control applied to the bilateral lumbar spine. Pt received stimulation at 100 % scan at a frequency of 18 V/CV  for 15     minutes.  Camila tolerated treatment well without any adverse effects.      Camila received the following manual therapy techniques for 10 min:  Friction Massage were applied to the R piriformis/R lumbo sacral to promote decreased  pain.    CAMILA received hot pack for 10 minutes to R hip/piriformis region to promote decreased pain and stiffness, pt was in sidelying positioning, 6-8 towel layers were used, skin was assessed before/after Tx with no noted redness or irritation present.     (Not this visit)CAMILA received cold pack for      minutes to     .      Home Exercises Provided and Patient Education Provided     Education provided: yes, handout    Written Home Exercises Provided: yes.  Exercises were reviewed and CAMILA was able to demonstrate them prior to the end of the session.  CAMILA demonstrated good  understanding of the education provided.     See EMR under Media for exercises provided 3/6/2023.    Assessment     Pt tolerated Tx well with therapist providing skilled cues for correct posture and positioning with all exercises.     CAMILA Is progressing well towards her goals.   Pt prognosis is Excellent.     Pt will continue to benefit from skilled outpatient physical therapy to address the deficits listed in the problem list box on initial evaluation, provide pt/family education and to maximize pt's level of independence in the home and community environment.     Pt's spiritual, cultural and educational needs considered and pt agreeable to plan of care and goals.     Anticipated barriers to physical therapy: none  Goals:  Patient will report decrease in pain to 0/10 to improve quality of life  Patient will increase overall FOTO score by 12 points or more demonstrating decreased difficulty with functional activities.   Patient will increase lower extremity and core strength to 4+ to 5/5 to improve ambulation ability.  Patient will demonstrate no palpable tenderness in R hip.  Patient will demonstrate independence with home exercise program.       Plan     Pt will continue with POC.    Jazmyn Nagy, PTA  3/13/2023

## 2023-03-15 ENCOUNTER — PATIENT OUTREACH (OUTPATIENT)
Dept: FAMILY MEDICINE | Facility: CLINIC | Age: 53
End: 2023-03-15
Payer: COMMERCIAL

## 2023-03-16 ENCOUNTER — OFFICE VISIT (OUTPATIENT)
Dept: FAMILY MEDICINE | Facility: CLINIC | Age: 53
End: 2023-03-16
Payer: COMMERCIAL

## 2023-03-16 VITALS
BODY MASS INDEX: 41.09 KG/M2 | DIASTOLIC BLOOD PRESSURE: 78 MMHG | OXYGEN SATURATION: 98 % | TEMPERATURE: 98 F | RESPIRATION RATE: 18 BRPM | HEART RATE: 66 BPM | HEIGHT: 65 IN | WEIGHT: 246.63 LBS | SYSTOLIC BLOOD PRESSURE: 120 MMHG

## 2023-03-16 DIAGNOSIS — F41.9 ANXIETY: Primary | ICD-10-CM

## 2023-03-16 DIAGNOSIS — N95.1 MENOPAUSAL SYMPTOMS: ICD-10-CM

## 2023-03-16 DIAGNOSIS — F32.9 REACTIVE DEPRESSION (SITUATIONAL): ICD-10-CM

## 2023-03-16 DIAGNOSIS — G89.4 CHRONIC PAIN SYNDROME: ICD-10-CM

## 2023-03-16 PROCEDURE — 3074F PR MOST RECENT SYSTOLIC BLOOD PRESSURE < 130 MM HG: ICD-10-PCS | Mod: ,,, | Performed by: NURSE PRACTITIONER

## 2023-03-16 PROCEDURE — 3078F PR MOST RECENT DIASTOLIC BLOOD PRESSURE < 80 MM HG: ICD-10-PCS | Mod: ,,, | Performed by: NURSE PRACTITIONER

## 2023-03-16 PROCEDURE — 3078F DIAST BP <80 MM HG: CPT | Mod: ,,, | Performed by: NURSE PRACTITIONER

## 2023-03-16 PROCEDURE — 99214 PR OFFICE/OUTPT VISIT, EST, LEVL IV, 30-39 MIN: ICD-10-PCS | Mod: ,,, | Performed by: NURSE PRACTITIONER

## 2023-03-16 PROCEDURE — 1159F MED LIST DOCD IN RCRD: CPT | Mod: ,,, | Performed by: NURSE PRACTITIONER

## 2023-03-16 PROCEDURE — 99214 OFFICE O/P EST MOD 30 MIN: CPT | Mod: ,,, | Performed by: NURSE PRACTITIONER

## 2023-03-16 PROCEDURE — 1159F PR MEDICATION LIST DOCUMENTED IN MEDICAL RECORD: ICD-10-PCS | Mod: ,,, | Performed by: NURSE PRACTITIONER

## 2023-03-16 PROCEDURE — 3008F PR BODY MASS INDEX (BMI) DOCUMENTED: ICD-10-PCS | Mod: ,,, | Performed by: NURSE PRACTITIONER

## 2023-03-16 PROCEDURE — 3008F BODY MASS INDEX DOCD: CPT | Mod: ,,, | Performed by: NURSE PRACTITIONER

## 2023-03-16 PROCEDURE — 3074F SYST BP LT 130 MM HG: CPT | Mod: ,,, | Performed by: NURSE PRACTITIONER

## 2023-03-16 NOTE — PROGRESS NOTES
Katia Zepeda NP   [facility]  [address]  [place]     PATIENT NAME: Kirti Morris  : 1970  DATE: 3/16/23  MRN: [id number]      Billing Provider: Katia Zepeda NP  Level of Service:   Patient PCP Information       Provider PCP Type    Katia Zepeda NP General            Reason for Visit / Chief Complaint: Follow-up (3 wk f/u ) and Anxiety       Update PCP  Update Chief Complaint         History of Present Illness / Problem Focused Workflow     Kirti Morris presents to the clinic for eval of anxiety, stated that he scalp is itching, has not started on cymbalta 60mg, only taking 30mg , She is wondering if med is causing scalp to itch, I told her that she could hold it for a few days to see if scalp improved, but she finallly stated that she thought she was just going to try different shampoo first      Review of Systems     Review of Systems   Constitutional:  Negative for chills, fatigue and fever.        Scalp itching   HENT:  Negative for nasal congestion, ear pain, facial swelling, hearing loss, mouth dryness, mouth sores, postnasal drip, rhinorrhea, sinus pressure/congestion and goiter.    Eyes:  Negative for discharge and itching.   Respiratory:  Negative for cough, shortness of breath and wheezing.    Cardiovascular:  Negative for chest pain and leg swelling.   Gastrointestinal:  Negative for abdominal pain, change in bowel habit and change in bowel habit.   Genitourinary:  Negative for difficulty urinating, dysuria, enuresis, frequency, hematuria and urgency.   Neurological:  Negative for dizziness, vertigo, syncope, weakness and headaches.   Psychiatric/Behavioral:  Negative for decreased concentration.       Medical / Social / Family History     Past Medical History:   Diagnosis Date    Angina at rest 2021    Anxiety     Arthritis     Asthma     Cerebral infarction 2021    Combined B12 and folate deficiency anemia 2020    COPD (chronic obstructive pulmonary  disease)     Coronary artery disease     acute MI 02/2014    CRF (chronic renal failure)     GERD (gastroesophageal reflux disease)     Hyperlipidemia     Hypertension     Myocardial infarction     Nonrheumatic mitral (valve) insufficiency 05/21/2018    Sleep apnea 01/06/2020    Stroke     Vitamin D deficiency 07/31/2020       Past Surgical History:   Procedure Laterality Date    CORONARY ANGIOPLASTY WITH STENT PLACEMENT  02/07/2014    HYSTERECTOMY      RADIOFREQUENCY ABLATION Right 10/13/2021    Procedure: RADIOFREQUENCY ABLATION;  Surgeon: David Combs MD;  Location: [facility];  Service: Pain Management;  Laterality: Right;  Right SI RFTC       Social History  Ms.  reports that she has been smoking cigarettes. She has been smoking an average of 1 pack per day. She has never used smokeless tobacco. She reports current alcohol use. She reports that she does not use drugs.    Family History  Ms.'s family history includes Arthritis in her mother; Ovarian cancer in her maternal aunt.    Medications and Allergies     Medications  Outpatient Medications Marked as Taking for the 3/16/23 encounter (Office Visit) with Katia Zepeda NP   Medication Sig Dispense Refill    albuterol (PROVENTIL) 2.5 mg /3 mL (0.083 %) nebulizer solution Take 3 mLs (2.5 mg total) by nebulization every 6 (six) hours as needed for Wheezing. Rescue 30 each 1    albuterol (PROVENTIL/VENTOLIN HFA) 90 mcg/actuation inhaler INHALE TWO PUFFS BY MOUTH TWICE DAILY FOR SHORTNESS OF BREATH / not more THAN FOUR TIMES DAILY 18 g 0    amLODIPine (NORVASC) 10 MG tablet Take 1 tablet (10 mg total) by mouth once daily. 30 tablet 3    ascorbic acid, vitamin C, (VITAMIN C) 500 MG tablet 500 mg.      aspirin (ECOTRIN) 81 MG EC tablet Take 81 mg by mouth once daily.      budesonide-formoterol 160-4.5 mcg (SYMBICORT) 160-4.5 mcg/actuation HFAA Inhale 2 puffs into the lungs every 12 (twelve) hours. Controller 10.2 g 0    cetirizine (ZYRTEC) 10 MG  tablet Take 1 tablet (10 mg total) by mouth once daily. 90 tablet 1    cholecalciferol, vitamin D3, (VITAMIN D3) 250 mcg (10,000 unit) Cap TAKE ONE CAPSULE BY MOUTH ONE TIME DAILY 90 capsule 0    clopidogreL (PLAVIX) 75 mg tablet Take 1 tablet (75 mg total) by mouth once daily. 30 tablet 3    DULoxetine (CYMBALTA) 30 MG capsule 1 capsule daily x 2 weeks then may increase to 2 po every am if needed 45 capsule 0    ezetimibe (ZETIA) 10 mg tablet Take 10 mg by mouth once daily.      famotidine (PEPCID) 20 MG tablet TAKE ONE Tablet BY MOUTH TWICE DAILY 60 tablet 5    fluticasone propionate (FLONASE) 50 mcg/actuation nasal spray 1 spray (50 mcg total) by Each Nostril route once daily. 15 g 0    gabapentin (NEURONTIN) 600 MG tablet TAKE ONE Tablet BY MOUTH THREE TIMES DAILY 90 tablet 1    HYDROcodone-acetaminophen (NORCO) 7.5-325 mg per tablet Take 1 tablet by mouth 2 (two) times daily as needed.      ibuprofen (ADVIL,MOTRIN) 600 MG tablet Take 600 mg by mouth every 8 (eight) hours as needed for Pain.      labetaloL (NORMODYNE) 200 MG tablet Take 1 tablet (200 mg total) by mouth 2 (two) times daily. 60 tablet 3    losartan (COZAAR) 50 MG tablet Take 1 tablet (50 mg total) by mouth once daily. 30 tablet 3    multivitamin/iron/folic acid (CENTRUM COMPLETE ORAL) Take by mouth Daily.      mv-mn/FA/bl coh/isoflav/jujube (ESTROVEN MENOPAUSE ORAL) Take by mouth once daily.      pantoprazole (PROTONIX) 40 MG tablet TAKE ONE Tablet BY MOUTH TWICE DAILY 60 tablet 3    REPATHA SURECLICK 140 mg/mL PnIj Inject 140 mg into the skin every 14 (fourteen) days.      tiZANidine (ZANAFLEX) 4 MG tablet TAKE ONE Tablet BY MOUTH EVERY 6 HOURS AS NEEDED FOR MUSCLE SPASMS / DO not take cyclobenzaprine while taking tizanidine 30 tablet 1       Allergies  Review of patient's allergies indicates:   Allergen Reactions    Atorvastatin      LIPITOR    Dog dander      Dog hair & epithelia    House dust mite      D. Ptery, D. Farinae    Kirit  "[hyoscyamine sulfate]     Nuts [tree nut]      BRAZIL NUTS    Pravastatin     Sulfa (sulfonamide antibiotics)     Weed pollen-short ragweed        Physical Examination     Vitals:    03/16/23 1420 03/16/23 1421   BP: (!) 153/93 120/78   BP Location: Right arm Right arm   Patient Position: Sitting Sitting   Pulse: 66    Resp: 18    Temp: 98.2 °F (36.8 °C)    TempSrc: Oral    SpO2: 98%    Weight: 111.9 kg (246 lb 9.6 oz)    Height: 5' 5" (1.651 m)       Physical Exam  Constitutional:       Appearance: Normal appearance.   HENT:      Head: Normocephalic.      Right Ear: Tympanic membrane, ear canal and external ear normal.      Left Ear: Tympanic membrane, ear canal and external ear normal.      Nose: Nose normal.      Mouth/Throat:      Mouth: Mucous membranes are moist.      Pharynx: Oropharynx is clear.   Eyes:      Extraocular Movements: Extraocular movements intact.      Conjunctiva/sclera: Conjunctivae normal.      Pupils: Pupils are equal, round, and reactive to light.   Cardiovascular:      Rate and Rhythm: Normal rate and regular rhythm.      Pulses: Normal pulses.      Heart sounds: Normal heart sounds.   Pulmonary:      Effort: Pulmonary effort is normal.      Breath sounds: Normal breath sounds.   Abdominal:      General: Bowel sounds are normal.      Palpations: Abdomen is soft.   Musculoskeletal:         General: Normal range of motion.      Cervical back: Normal range of motion.   Skin:     General: Skin is warm and dry.      Capillary Refill: Capillary refill takes less than 2 seconds.   Neurological:      General: No focal deficit present.      Mental Status: She is alert and oriented to person, place, and time.   Psychiatric:         Mood and Affect: Mood normal.         Behavior: Behavior normal.        Assessment and Plan (including Health Maintenance)      Problem List  Smart Sets  Document Outside HM   :    Plan:     Menopausal symptoms  -     Ambulatory referral/consult to Obstetrics / " Gynecology; Future; Expected date: 03/23/2023    Chronic pain syndrome    Anxiety    Reactive depression (situational)            Health Maintenance Due   Topic Date Due    Hepatitis C Screening  Never done    COVID-19 Vaccine (1) Never done    Pneumococcal Vaccines (Age 0-64) (1 - PCV) Never done    HIV Screening  Never done    TETANUS VACCINE  Never done    High Dose Statin  Never done    Hemoglobin A1c (Diabetic Prevention Screening)  Never done    Shingles Vaccine (1 of 2) Never done    Mammogram  07/23/2022    Influenza Vaccine (1) 09/01/2022       Problem List Items Addressed This Visit          Neuro    Chronic pain syndrome       Psychiatric    Anxiety    Reactive depression (situational)     Other Visit Diagnoses       Menopausal symptoms    -  Primary    Relevant Orders    Ambulatory referral/consult to Obstetrics / Gynecology              Health Maintenance Topics with due status: Not Due       Topic Last Completion Date    Colorectal Cancer Screening 04/25/2022    Lipid Panel 08/04/2022       Procedures     Future Appointments   Date Time Provider Department Center   3/20/2023  1:15 PM Bakari Davis, PT [facility] [place]   3/22/2023  1:15 PM Jazmyn Nagy, PTA [facility] [place] [facility]   3/27/2023  1:15 PM Bakari Davis, PT [facility] [place] [facility]   3/29/2023  1:15 PM Bakari Davis, PT [facility] [place] [facility]   4/3/2023  1:15 PM Jazmyn Nagy, PTA [facility] [place] [facility]   4/5/2023  1:15 PM Bakari Davis, PT [facility] [place] [facility]   4/10/2023  1:15 PM Jazmyn Nagy, PTA [facility] [place] [facility]   4/12/2023  1:15 PM Bakari Davis, PT [facility] [place] [facility]   5/2/2023  1:00 PM Katia Zepeda NP [facility] [place]   5/8/2023  1:15 PM Alonso Clark DO [facility] [place] [facility]        No follow-ups on file.       Signature:  Katia Zepeda NP    Date of encounter: 3/16/23

## 2023-03-19 ENCOUNTER — PATIENT OUTREACH (OUTPATIENT)
Dept: ADMINISTRATIVE | Facility: HOSPITAL | Age: 53
End: 2023-03-19

## 2023-03-19 NOTE — PROGRESS NOTES
Chief Complaint:   Patient presents with:  Hypertension      HPI:   This is a 64year old male coming in for follow-up care. Patient is been noncompliant with his diabetes and hypertension.   Patient states that he has poor insurance coverage with very hig Post visit Population Health review of encounter with date of service 03/16/2023 with SHOLA Zepeda.  Encounter does not qualify as HY or [facility].  Has HY scheduled for 05/02/2023.         at 67 of cerebral hemorrhage, had diabetes and CAD   • Other (CAD) Father         Other  in his upper [de-identified] with CAD, having CABG at age 80      Social History    Socioeconomic History      Marital status:       Spouse name: Not on file      Numbe bruising. PSYCHIATRIC:  Denies depression or anxiety.   ENDOCRINOLOGIC:  Denies cold or heat intolerance,   ALLERGIES:  Denies allergic response,       EXAM:   /86 (BP Location: Right arm, Patient Position: Sitting, Cuff Size: large)   Pulse 74   Tem for refills. Consider doubling dose of lisinopril       Recheck in 6 months      Patient/Caregiver Education: Patient/Caregiver Education: There are no barriers to learning. Medical education done. Outcome: Patient verbalizes understanding.  Patient is

## 2023-03-20 ENCOUNTER — CLINICAL SUPPORT (OUTPATIENT)
Dept: REHABILITATION | Facility: HOSPITAL | Age: 53
End: 2023-03-20
Payer: COMMERCIAL

## 2023-03-20 DIAGNOSIS — M54.17 LUMBOSACRAL RADICULOPATHY: Primary | ICD-10-CM

## 2023-03-20 PROCEDURE — 97110 THERAPEUTIC EXERCISES: CPT

## 2023-03-20 NOTE — PROGRESS NOTES
Physical Therapy Treatment Note     Name: Camila Morris  Clinic Number: [id number]    Therapy Diagnosis:   Encounter Diagnosis   Name Primary?    Lumbosacral radiculopathy Yes     Physician: David Combs MD    Visit Date: 3/20/2023    Physician Orders: PT Eval and Treat Lumbosacral radiculopathy  Medical Diagnosis from Referral: Lumbosacral radiculopathy M54.17  Evaluation Date: 3/2/2023  Authorization Period Expiration: 4/13/2023  Plan of Care Expiration: 4/13/2023  Plan of care Certification: 3/2/2023 to 4/13/2023.   Updated Plan of Care Due:   Visit # / Visits authorized: 5/ 12      PTA Visit #: 0    Time In: 1:20 PM   Time Out: 2:00 PM  Total Billable Time:   40   minutes    Precautions: Standard      Subjective     Pt reports: Pain in R posterior/lower hip  She was compliant with home exercise program.      Pain: 5/10   Location: bilateral back      Objective     CAMILA received therapeutic exercises to develop strength, endurance, ROM, flexibility, and posture for 40 minutes including:    Sci Fit Stepper x 7 min   Bridging 1x15 with 3 sec holds  Pelvic tilts 1x10 with 3 sec holds  Trunk rotation 1x10 x 3 sec  Seated forward ball rolls 5 x 10 sec holds  Cat/cow 1x10 (not this visit)  Prayer stretch 5x5 sec each (not this visit)  SKTC 10 x 5 sec each   Piriformis stretching 5x10 sec each   SLR 1x 10 (attempted but painful)  Sidelying hip abd 1x10   Clamshell 2 x10    (Not This Visit)   CAMILA received the following supervised modalities after being cleared for contradictions: IFC Electrical Stimulation:  Camila received IFC Electrical Stimulation for pain control applied to the bilateral lumbar spine. Pt received stimulation at 100 % scan at a frequency of 18 V/CV  for 15     minutes.  Camila tolerated treatment well without any adverse effects.      (Not This Visit) Camila received the following manual therapy techniques for 10 min:  Friction Massage were applied to the R piriformis/R lumbo sacral to  promote decreased pain.    (Not This Visit) CAMILA received hot pack for 10 minutes to R hip/piriformis region to promote decreased pain and stiffness, pt was in sidelying positioning, 6-8 towel layers were used, skin was assessed before/after Tx with no noted redness or irritation present.     (Not this visit)CAMILA received cold pack for      minutes to     .      Home Exercises Provided and Patient Education Provided     Education provided: yes, handout    Written Home Exercises Provided: yes.  Exercises were reviewed and CAMILA was able to demonstrate them prior to the end of the session.  CAMILA demonstrated good  understanding of the education provided.     See EMR under Media for exercises provided 3/6/2023.    Assessment     Pt tolerated Tx well with therapist providing skilled cues for correct posture and positioning with all exercises.     CAMILA Is progressing well towards her goals.   Pt prognosis is Excellent.     Pt will continue to benefit from skilled outpatient physical therapy to address the deficits listed in the problem list box on initial evaluation, provide pt/family education and to maximize pt's level of independence in the home and community environment.     Pt's spiritual, cultural and educational needs considered and pt agreeable to plan of care and goals.     Anticipated barriers to physical therapy: none  Goals:  Patient will report decrease in pain to 0/10 to improve quality of life  Patient will increase overall FOTO score by 12 points or more demonstrating decreased difficulty with functional activities.   Patient will increase lower extremity and core strength to 4+ to 5/5 to improve ambulation ability.  Patient will demonstrate no palpable tenderness in R hip.  Patient will demonstrate independence with home exercise program.       Plan     Pt will continue with POC.    Bakari Davis, PT  3/20/2023

## 2023-03-22 ENCOUNTER — CLINICAL SUPPORT (OUTPATIENT)
Dept: REHABILITATION | Facility: HOSPITAL | Age: 53
End: 2023-03-22
Payer: COMMERCIAL

## 2023-03-22 DIAGNOSIS — M54.17 LUMBOSACRAL RADICULOPATHY: Primary | ICD-10-CM

## 2023-03-22 PROCEDURE — 97140 MANUAL THERAPY 1/> REGIONS: CPT | Mod: CQ

## 2023-03-22 PROCEDURE — 97110 THERAPEUTIC EXERCISES: CPT | Mod: CQ

## 2023-03-22 NOTE — PROGRESS NOTES
Physical Therapy Treatment Note     Name: Camila Morris  Clinic Number: [id number]    Therapy Diagnosis:   Encounter Diagnosis   Name Primary?    Lumbosacral radiculopathy Yes     Physician: David Combs MD    Visit Date: 3/22/2023    Physician Orders: PT Eval and Treat Lumbosacral radiculopathy  Medical Diagnosis from Referral: Lumbosacral radiculopathy M54.17  Evaluation Date: 3/2/2023  Authorization Period Expiration: 4/13/2023  Plan of Care Expiration: 4/13/2023  Plan of care Certification: 3/2/2023 to 4/13/2023.   Updated Plan of Care Due:   Visit # / Visits authorized: 6/ 12      PTA Visit #: 1    Time In: 1:30 PM   Time Out: 2:17 PM  Total Billable Time:  47   minutes    Precautions: Standard      Subjective     Pt reports: Pain in R posterior/lower hip  She was compliant with home exercise program.      Pain: 5/10   Location: bilateral back      Objective     CAMILA received therapeutic exercises to develop strength, endurance, ROM, flexibility, and posture for 37 minutes including:    Sci Fit Stepper x 7 min   Bridging 1x15 with 3 sec holds  Pelvic tilts 1x10 with 3 sec holds  Trunk rotation 1x10 x 3 sec  Seated forward ball rolls 5 x 10 sec holds  Cat/cow 1x10 (not this visit)  Prayer stretch 5x5 sec each (not this visit)  SKTC 10 x 5 sec each   Piriformis stretching 5x10 sec each   SLR 1x 10 (attempted but painful)  Sidelying hip abd 1x10   Clamshell 2 x10    (Not This Visit)   CAMILA received the following supervised modalities after being cleared for contradictions: IFC Electrical Stimulation:  Camila received IFC Electrical Stimulation for pain control applied to the bilateral lumbar spine. Pt received stimulation at 100 % scan at a frequency of 18 V/CV  for 15     minutes.  Camila tolerated treatment well without any adverse effects.      Camila received the following manual therapy techniques for 10 min:  Friction Massage were applied to the R piriformis/R lumbo sacral to promote decreased  pain.    (Not This Visit) CAMILA received hot pack for 10 minutes to R hip/piriformis region to promote decreased pain and stiffness, pt was in sidelying positioning, 6-8 towel layers were used, skin was assessed before/after Tx with no noted redness or irritation present.     (Not this visit)CAMILA received cold pack for      minutes to     .      Home Exercises Provided and Patient Education Provided     Education provided: yes, handout    Written Home Exercises Provided: yes.  Exercises were reviewed and CAMILA was able to demonstrate them prior to the end of the session.  CAMILA demonstrated good  understanding of the education provided.     See EMR under Media for exercises provided 3/6/2023.    Assessment     Pt tolerated Tx well with therapist providing skilled cues for correct posture and positioning with all exercises.     CAMILA Is progressing well towards her goals.   Pt prognosis is Excellent.     Pt will continue to benefit from skilled outpatient physical therapy to address the deficits listed in the problem list box on initial evaluation, provide pt/family education and to maximize pt's level of independence in the home and community environment.     Pt's spiritual, cultural and educational needs considered and pt agreeable to plan of care and goals.     Anticipated barriers to physical therapy: none  Goals:  Patient will report decrease in pain to 0/10 to improve quality of life  Patient will increase overall FOTO score by 12 points or more demonstrating decreased difficulty with functional activities.   Patient will increase lower extremity and core strength to 4+ to 5/5 to improve ambulation ability.  Patient will demonstrate no palpable tenderness in R hip.  Patient will demonstrate independence with home exercise program.       Plan     Pt will continue with POC.    Jazmyn Nagy, PTA  3/22/2023

## 2023-04-03 ENCOUNTER — CLINICAL SUPPORT (OUTPATIENT)
Dept: REHABILITATION | Facility: HOSPITAL | Age: 53
End: 2023-04-03
Payer: COMMERCIAL

## 2023-04-03 DIAGNOSIS — M54.17 LUMBOSACRAL RADICULOPATHY: Primary | ICD-10-CM

## 2023-04-03 PROCEDURE — 97110 THERAPEUTIC EXERCISES: CPT | Mod: CQ

## 2023-04-03 NOTE — PROGRESS NOTES
Physical Therapy Treatment Note     Name: Camila Morris  Clinic Number: [id number]    Therapy Diagnosis:   Encounter Diagnosis   Name Primary?    Lumbosacral radiculopathy Yes     Physician: David Combs MD    Visit Date: 4/3/2023    Physician Orders: PT Eval and Treat Lumbosacral radiculopathy  Medical Diagnosis from Referral: Lumbosacral radiculopathy M54.17  Evaluation Date: 3/2/2023  Authorization Period Expiration: 4/13/2023  Plan of Care Expiration: 4/13/2023  Plan of care Certification: 3/2/2023 to 4/13/2023.   Updated Plan of Care Due:   Visit # / Visits authorized: 7/ 12      PTA Visit #: 2    Time In: 1:12 PM   Time Out:  1:55   PM  Total Billable Time:  43 minutes    Precautions: Standard      Subjective     Pt reports: Pain in R posterior/lower hip  She was compliant with home exercise program.      Pain: 6/10   Location: bilateral back      Objective     CAMILA received therapeutic exercises to develop strength, endurance, ROM, flexibility, and posture for 43 minutes including:    Sci Fit Stepper x 7 min   Bridging 1x15 with 3 sec holds  Pelvic tilts 1x10 with 3 sec holds (not this visit)  Trunk rotation 1x10 x 3 sec  Seated forward ball rolls 5 x 10 sec holds  Cat/cow 1x10   Bird dog 1x10   Prayer stretch 5x5 sec each (not this visit)  SKTC 10 x 5 sec each (not this visit)  Piriformis stretching seated edge of mat 5x10 sec each   Fire hydrants RLE 1x15   Donkey kicks 1x10 RLE  SLR 2x 10   Sidelying hip abd 1x15   Clamshell 2 x10    (Not This Visit)   CAMILA received the following supervised modalities after being cleared for contradictions: IFC Electrical Stimulation:  Camila received IFC Electrical Stimulation for pain control applied to the bilateral lumbar spine. Pt received stimulation at 100 % scan at a frequency of 18 V/CV  for 15     minutes.  Camila tolerated treatment well without any adverse effects.      (Not This Visit) Camila received the following manual therapy techniques for 10  min:  Friction Massage were applied to the R piriformis/R lumbo sacral to promote decreased pain.    (Not This Visit) CAMILA received hot pack for 10 minutes to R hip/piriformis region to promote decreased pain and stiffness, pt was in sidelying positioning, 6-8 towel layers were used, skin was assessed before/after Tx with no noted redness or irritation present.     (Not this visit)CAMILA received cold pack for   5 minutes bilateral lumbar spine with pt in seated position.  Pt skin assessed before/after Tx with no noted redness or irritation present.       Home Exercises Provided and Patient Education Provided     Education provided: yes, handout    Written Home Exercises Provided: yes.  Exercises were reviewed and CAMILA was able to demonstrate them prior to the end of the session.  CAMILA demonstrated good  understanding of the education provided.     See EMR under Media for exercises provided 3/6/2023.    Assessment     Pt tolerated Tx well with therapist providing skilled cues for correct posture and positioning with all exercises.     CAMILA Is progressing well towards her goals.   Pt prognosis is Excellent.     Pt will continue to benefit from skilled outpatient physical therapy to address the deficits listed in the problem list box on initial evaluation, provide pt/family education and to maximize pt's level of independence in the home and community environment.     Pt's spiritual, cultural and educational needs considered and pt agreeable to plan of care and goals.     Anticipated barriers to physical therapy: none  Goals:  Patient will report decrease in pain to 0/10 to improve quality of life  Patient will increase overall FOTO score by 12 points or more demonstrating decreased difficulty with functional activities.   Patient will increase lower extremity and core strength to 4+ to 5/5 to improve ambulation ability.  Patient will demonstrate no palpable tenderness in R hip.  Patient will demonstrate  independence with home exercise program.       Plan     Pt will continue with POC.    Jazmyn Nagy, PTA  4/3/2023

## 2023-04-12 ENCOUNTER — CLINICAL SUPPORT (OUTPATIENT)
Dept: REHABILITATION | Facility: HOSPITAL | Age: 53
End: 2023-04-12
Payer: COMMERCIAL

## 2023-04-12 DIAGNOSIS — M54.17 LUMBOSACRAL RADICULOPATHY: Primary | ICD-10-CM

## 2023-04-12 PROCEDURE — 97110 THERAPEUTIC EXERCISES: CPT

## 2023-04-12 NOTE — PROGRESS NOTES
Physical Therapy Treatment Note     Name: Camila Morris  Clinic Number: [id number]    Therapy Diagnosis:   No diagnosis found.    Physician: David Combs MD    Visit Date: 4/12/2023    Physician Orders: PT Eval and Treat Lumbosacral radiculopathy  Medical Diagnosis from Referral: Lumbosacral radiculopathy M54.17  Evaluation Date: 3/2/2023  Authorization Period Expiration: 4/13/2023  Plan of Care Expiration: 4/13/2023  Plan of care Certification: 3/2/2023 to 4/13/2023.   Updated Plan of Care Due:   Visit # / Visits authorized: 8/ 12      PTA Visit #: 0    Time In: 1:15 PM   Time Out:  2:00   PM  Total Billable Time:  45 minutes    Precautions: Standard      Subjective     Pt reports: Pain in R posterior/lower hip  She was compliant with home exercise program.      Pain: 6/10   Location: bilateral back      Objective     CAMILA received therapeutic exercises to develop strength, endurance, ROM, flexibility, and posture for 43 minutes including:    Sci Fit Stepper x 7 min   Prone Prop x2 mins  Prone Press Ups  Bridging 1x15 with 3 sec holds  Pelvic tilts 1x10 with 3 sec holds (not this visit)  Trunk rotation 1x10 x 3 sec  Seated forward ball rolls 5 x 10 sec holds  Cat/cow 1x10   Bird dog 1x10   Prayer stretch 5x5 sec each (not this visit)  SKTC 10 x 5 sec each (not this visit)  Piriformis stretching seated edge of mat 5x10 sec each   Fire hydrants RLE 1x15   Donkey kicks 1x10 RLE  SLR 2x 10   Sidelying hip abd 1x15   Clamshell 2 x10    (Not This Visit)   CAMILA received the following supervised modalities after being cleared for contradictions: IFC Electrical Stimulation:  Camila received IFC Electrical Stimulation for pain control applied to the bilateral lumbar spine. Pt received stimulation at 100 % scan at a frequency of 18 V/CV  for 15     minutes.  Camila tolerated treatment well without any adverse effects.      (Not This Visit) Camila received the following manual therapy techniques for 10 min:   Friction Massage were applied to the R piriformis/R lumbo sacral to promote decreased pain.    (Not This Visit) CAMILA received hot pack for 10 minutes to R hip/piriformis region to promote decreased pain and stiffness, pt was in sidelying positioning, 6-8 towel layers were used, skin was assessed before/after Tx with no noted redness or irritation present.     (Not this visit)CAMILA received cold pack for   5 minutes bilateral lumbar spine with pt in seated position.  Pt skin assessed before/after Tx with no noted redness or irritation present.       Home Exercises Provided and Patient Education Provided     Education provided: yes, handout    Written Home Exercises Provided: yes.  Exercises were reviewed and CAMILA was able to demonstrate them prior to the end of the session.  CAMILA demonstrated good  understanding of the education provided.     See EMR under Media for exercises provided 3/6/2023.    Assessment     Pt assessed for all set goals and will be discharged on this date due to meeting max functional potential.      Anticipated barriers to physical therapy: none  Goals: None Achieved  Patient will report decrease in pain to 0/10 to improve quality of life  Patient will increase overall FOTO score by 12 points or more demonstrating decreased difficulty with functional activities.   Patient will increase lower extremity and core strength to 4+ to 5/5 to improve ambulation ability.  Patient will demonstrate no palpable tenderness in R hip.  Patient will demonstrate independence with home exercise program.       Plan     armando Davis, PT  4/12/2023

## 2023-05-01 RX ORDER — PANTOPRAZOLE SODIUM 40 MG/1
TABLET, DELAYED RELEASE ORAL
Qty: 60 TABLET | Refills: 3 | Status: SHIPPED | OUTPATIENT
Start: 2023-05-01 | End: 2023-11-10 | Stop reason: SDUPTHER

## 2023-05-01 RX ORDER — GABAPENTIN 600 MG/1
TABLET ORAL
Qty: 90 TABLET | Refills: 1 | Status: SHIPPED | OUTPATIENT
Start: 2023-05-01 | End: 2023-06-27

## 2023-05-02 ENCOUNTER — OFFICE VISIT (OUTPATIENT)
Dept: FAMILY MEDICINE | Facility: CLINIC | Age: 53
End: 2023-05-02
Payer: COMMERCIAL

## 2023-05-02 VITALS
TEMPERATURE: 98 F | SYSTOLIC BLOOD PRESSURE: 114 MMHG | DIASTOLIC BLOOD PRESSURE: 76 MMHG | HEART RATE: 70 BPM | WEIGHT: 254 LBS | OXYGEN SATURATION: 99 % | RESPIRATION RATE: 20 BRPM | BODY MASS INDEX: 42.32 KG/M2 | HEIGHT: 65 IN

## 2023-05-02 DIAGNOSIS — E78.5 HYPERLIPIDEMIA, UNSPECIFIED HYPERLIPIDEMIA TYPE: ICD-10-CM

## 2023-05-02 DIAGNOSIS — Z00.00 ENCOUNTER FOR PREVENTIVE CARE: Primary | ICD-10-CM

## 2023-05-02 DIAGNOSIS — Z13.1 SCREENING FOR DIABETES MELLITUS: ICD-10-CM

## 2023-05-02 DIAGNOSIS — Z13.220 SCREENING FOR LIPOID DISORDERS: ICD-10-CM

## 2023-05-02 DIAGNOSIS — I10 ESSENTIAL HYPERTENSION: ICD-10-CM

## 2023-05-02 DIAGNOSIS — Z12.31 ENCOUNTER FOR SCREENING MAMMOGRAM FOR BREAST CANCER: ICD-10-CM
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
## 2023-05-02 DIAGNOSIS — F32.9 REACTIVE DEPRESSION (SITUATIONAL): ICD-10-CM

## 2023-05-02 DIAGNOSIS — S50.362A INSECT BITE OF LEFT ELBOW, INITIAL ENCOUNTER: ICD-10-CM

## 2023-05-02 PROCEDURE — 4010F ACE/ARB THERAPY RXD/TAKEN: CPT | Mod: ,,, | Performed by: NURSE PRACTITIONER

## 2023-05-02 PROCEDURE — 3008F BODY MASS INDEX DOCD: CPT | Mod: ,,, | Performed by: NURSE PRACTITIONER

## 2023-05-02 PROCEDURE — 99396 PR PREVENTIVE VISIT,EST,40-64: ICD-10-PCS | Mod: ,,, | Performed by: NURSE PRACTITIONER

## 2023-05-02 PROCEDURE — 4010F PR ACE/ARB THEARPY RXD/TAKEN: ICD-10-PCS | Mod: ,,, | Performed by: NURSE PRACTITIONER

## 2023-05-02 PROCEDURE — 3078F DIAST BP <80 MM HG: CPT | Mod: ,,, | Performed by: NURSE PRACTITIONER

## 2023-05-02 PROCEDURE — 3008F PR BODY MASS INDEX (BMI) DOCUMENTED: ICD-10-PCS | Mod: ,,, | Performed by: NURSE PRACTITIONER

## 2023-05-02 PROCEDURE — 1159F MED LIST DOCD IN RCRD: CPT | Mod: ,,, | Performed by: NURSE PRACTITIONER

## 2023-05-02 PROCEDURE — 1159F PR MEDICATION LIST DOCUMENTED IN MEDICAL RECORD: ICD-10-PCS | Mod: ,,, | Performed by: NURSE PRACTITIONER

## 2023-05-02 PROCEDURE — 3074F SYST BP LT 130 MM HG: CPT | Mod: ,,, | Performed by: NURSE PRACTITIONER

## 2023-05-02 PROCEDURE — 3074F PR MOST RECENT SYSTOLIC BLOOD PRESSURE < 130 MM HG: ICD-10-PCS | Mod: ,,, | Performed by: NURSE PRACTITIONER

## 2023-05-02 PROCEDURE — 99396 PREV VISIT EST AGE 40-64: CPT | Mod: ,,, | Performed by: NURSE PRACTITIONER

## 2023-05-02 PROCEDURE — 3078F PR MOST RECENT DIASTOLIC BLOOD PRESSURE < 80 MM HG: ICD-10-PCS | Mod: ,,, | Performed by: NURSE PRACTITIONER

## 2023-05-02 NOTE — ASSESSMENT & PLAN NOTE
Health goals to improve overall health and well-being:  BMI < 30 - lose 1-2 lbs per week  Healthy/DASH diet, exercise at least 5 days per week  fasting glucose < 100  SBP < 130 & DBP < 80  LDL chol < 100  Stressed medication adherence.

## 2023-05-02 NOTE — PATIENT INSTRUCTIONS
"Patient Education       High Blood Pressure in Adults   The Basics   Written by the doctors and editors at [facility]   What is high blood pressure? -- High blood pressure is a condition that puts you at risk for heart attack, stroke, and kidney disease. It does not usually cause symptoms. But it can be serious.  When your doctor or nurse tells you your blood pressure, they will say 2 numbers. For instance, your doctor or nurse might say that your blood pressure is "130 over 80." The top number is the pressure inside your arteries when your heart is jeff. The bottom number is the pressure inside your arteries when your heart is relaxed.  "Elevated blood pressure" is a term doctors or nurses use as a warning. People with elevated blood pressure do not yet have high blood pressure. But their blood pressure is not as low as it should be for good health.  Many experts define high, elevated, and normal blood pressure as follows:  High - Top number of 130 or above and/or bottom number of 80 or above  Elevated - Top number between 120 and 129 and bottom number of 79 or below  Normal - Top number of 119 or below and bottom number of 79 or below  This information is also in the table (table 1).   How can I lower my blood pressure? -- If your doctor or nurse has prescribed blood pressure medicine, the most important thing you can do is to take it. If it causes side effects, do not just stop taking it. Instead, talk to your doctor or nurse about the problems it causes. They might be able to lower your dose or switch you to another medicine. If cost is a problem, mention that too. They might be able to put you on a less expensive medicine. Taking your blood pressure medicine can keep you from having a heart attack or stroke, and it can save your life!  Can I do anything on my own? -- You have a lot of control over your blood pressure. To lower it:  Lose weight (if you are overweight)  Choose a diet low in fat and rich in " "fruits, vegetables, and low-fat dairy products  Reduce the amount of salt you eat  Do something active for at least 30 minutes a day on most days of the week  Cut down on alcohol (if you drink more than 2 alcoholic drinks per day)  It's also a good idea to get a home blood pressure meter. People who check their own blood pressure at home do better at keeping it low and can sometimes even reduce the amount of medicine they take.  All topics are updated as new evidence becomes available and our peer review process is complete.  This topic retrieved from [employer] on: Sep 21, 2021.  Topic 41757 Version 15.0  Release: 29.4.2 - C29.263  © 2021 UpToDate, Inc. and/or its affiliates. All rights reserved.  table 1: Definition of normal and high blood pressure  Level  Top number  Bottom number    High 130 or above 80 or above   Elevated 120 to 129 79 or below   Normal 119 or below 79 or below   These definitions are from the American College of Cardiology/American Heart Association. Other expert groups might use slightly different definitions.  "Elevated blood pressure" is a term doctor or nurses use as a warning. It means you do not yet have high blood pressure, but your blood pressure is not as low as it should be for good health.  Graphic 64609 Version 6.0  Consumer Information Use and Disclaimer   This information is not specific medical advice and does not replace information you receive from your health care provider. This is only a brief summary of general information. It does NOT include all information about conditions, illnesses, injuries, tests, procedures, treatments, therapies, discharge instructions or life-style choices that may apply to you. You must talk with your health care provider for complete information about your health and treatment options. This information should not be used to decide whether or not to accept your health care provider's advice, instructions or recommendations. Only your health care " "provider has the knowledge and training to provide advice that is right for you. The use of this information is governed by the [employer] End User License Agreement, available at https://www.Smallaa.[employer]/en/solutions/[employer]/about/joshua.The use of [employer] content is governed by the [employer] Terms of Use. ©2021 UpToDate, Inc. All rights reserved.  Copyright   © 2021 UpToDate, Inc. and/or its affiliates. All rights reserved.    Patient Education       High Cholesterol   The Basics   Written by the doctors and editors at [facility]   What is cholesterol? -- Cholesterol is a substance that is found in the blood. Everyone has some. It is needed for good health. The problem is, people sometimes have too much cholesterol. Compared with people with normal cholesterol, people with high cholesterol have a higher risk of heart attacks, strokes, and other health problems. The higher your cholesterol, the higher your risk of these problems.  Are there different types of cholesterol? -- Yes, there are a few different types. If you get a cholesterol test, you might hear your doctor or nurse talk about:  Total cholesterol  LDL cholesterol - Some people call this the "bad" cholesterol. That's because having high LDL levels raises your risk of heart attacks, strokes, and other health problems.  HDL cholesterol - Some people call this the "good" cholesterol. That's because people with high HDL levels tend to have a lower risk of heart attacks, strokes, and other health problems.   Non-HDL cholesterol - Non-HDL cholesterol is your total cholesterol minus your HDL cholesterol.  Triglycerides - Triglycerides are not cholesterol. They are another type of fat. But they often get measured when cholesterol is measured. (Having high triglycerides also seems to increase the risk of heart attacks and strokes.)  What should my numbers be? -- Ask your doctor or nurse what your numbers should be. Different people need different goals. (If you " "live outside the [place], see the table (table 1)). In general, people who do not already have heart disease should aim for:  Total cholesterol below 200  LDL cholesterol below 130 - or much lower, if they are at risk of heart attacks or strokes  HDL cholesterol above 60  Non-HDL cholesterol below 160 - or lower, if they are at risk of heart attacks or strokes  Triglycerides below 150  Keep in mind, though, that many people who cannot meet these goals still have a low risk of heart attacks and strokes.  What should I do if my doctor tells me I have high cholesterol? -- Ask your doctor what your overall risk of heart attacks and strokes is. High cholesterol, by itself, is not always a reason to worry. Having high cholesterol is just one of many things that can increase your risk of heart attacks and strokes. Other factors that increase your risk include:  Cigarette smoking  High blood pressure  Having a parent, sister, or brother who got heart disease at a young age - Young, in this case, means younger than 55 for men and younger than 65 for women.  A diet that is not heart healthy - A "heart-healthy" diet includes lots of fruits and vegetables, fiber, and healthy fats (like those found in fish and certain oils). It also means limiting sugar and unhealthy fats.  Older age  If you are at high risk of heart attacks and strokes, having high cholesterol is a problem. On the other hand, if you are at low risk, having high cholesterol might not lead to treatment.  Should I take medicine to lower cholesterol? -- Not everyone who has high cholesterol needs medicines. Your doctor or nurse will decide if you need them based on your age, family history, and other health concerns.  You should probably take a cholesterol-lowering medicine called a statin if you:  Already had a heart attack or stroke  Have known heart disease  Have diabetes  Have a condition called peripheral artery disease, which makes it painful to walk, " and happens when the arteries in your legs get clogged with fatty deposits  Have an abdominal aortic aneurysm, which is a widening of the main artery in the belly  Most people with any of the conditions listed above should take a statin no matter what their cholesterol level is. If your doctor or nurse puts you on a statin, stay on it. The medicine might not make you feel any different. But it can help prevent heart attacks, strokes, and death.  Can I lower my cholesterol without medicines? -- Yes, you can lower your cholesterol some by:  Avoiding red meat, butter, fried foods, cheese, and other foods that have a lot of saturated fat  Losing weight (if you are overweight)  Being more active  Even if these steps do little to change your cholesterol, they can improve your health in many ways.  All topics are updated as new evidence becomes available and our peer review process is complete.  This topic retrieved from [employer] on: Sep 21, 2021.  Topic 58046 Version 19.0  Release: 29.4.2 - C29.263  © 2021 UpToDate, Inc. and/or its affiliates. All rights reserved.  table 1: Cholesterol and triglyceride measurements in the [place] and elsewhere     Measurement used within the [place] Milligrams/deciliter (mg/dL)  Measurement used most places outside the [place] Millimoles/liter (mmol/Liter)     Level to aim for  Level to aim for    Total cholesterol  Below 200 Below 5.17   LDL cholesterol  Below 130 - or much lower if at risk of heart attack and stroke Below 3.36 - or much lower if at risk of heart attack and stroke   HDL cholesterol  Above 60 Above 1.55   Triglycerides  Below 150 Below 1.7   Cholesterol is measured differently in the [place] than it is in most other countries. This table shows values used within and outside the [place]. It includes the cholesterol and triglyceride levels that most people who do not have heart disease should aim for.  Graphic 39342 Version 3.0  Consumer  Information Use and Disclaimer   This information is not specific medical advice and does not replace information you receive from your health care provider. This is only a brief summary of general information. It does NOT include all information about conditions, illnesses, injuries, tests, procedures, treatments, therapies, discharge instructions or life-style choices that may apply to you. You must talk with your health care provider for complete information about your health and treatment options. This information should not be used to decide whether or not to accept your health care provider's advice, instructions or recommendations. Only your health care provider has the knowledge and training to provide advice that is right for you. The use of this information is governed by the [employer] End User License Agreement, available at https://www.Vestmark/en/solutions/[employer]/about/joshua.The use of [employer] content is governed by the [employer] Terms of Use. ©2021 UpToDate, Inc. All rights reserved.  Copyright   © 2021 UpToDate, Inc. and/or its affiliates. All rights reserved.    Patient Education       Diet and Health   The Basics   Written by the doctors and editors at [employer]   Why is it important to eat a healthy diet? -- It's important to eat a healthy diet because eating the right foods can keep you healthy now and later on in life.  Which foods are especially healthy? -- Foods that are especially healthy include:  Fruits and vegetables - Eating a diet with lots of fruits and vegetables can help prevent heart disease and strokes. It might also help prevent certain types of cancers. Try to eat fruits and vegetables at each meal and also for snacks. If you don't have fresh fruits and vegetables available, you can eat frozen or canned ones instead. Doctors recommend eating at least 2 1/2 servings of vegetables and 2 servings of fruits each day.  Foods with fiber - Eating foods with a lot of fiber can help  "prevent heart disease and strokes. If you have type 2 diabetes, it can also help control your blood sugar. Foods that have a lot of fiber include vegetables, fruits, beans, nuts, oatmeal, and whole grain breads and cereals. You can tell how much fiber is in a food by reading the nutrition label (figure 1). Doctors recommend eating 25 to 36 grams of fiber each day.  Foods with folate (also called folic acid) - Folate is a vitamin that is important for pregnant people, since it helps prevent certain birth defects. Anyone who could get pregnant should get at least 400 micrograms of folic acid daily, whether or not they are actively trying to get pregnant. Folate is found in many breakfast cereals, oranges, orange juice, and green leafy vegetables.  Foods with calcium and vitamin D - Babies, children, and adults need calcium and vitamin D to help keep their bones strong. Adults also need calcium and vitamin D to help prevent osteoporosis. Osteoporosis is a condition that causes bones to get "thin" and break more easily than usual. Different foods and drinks have calcium and vitamin D in them (figure 2). People who don't get enough calcium and vitamin D in their diet might need to take a supplement.  Foods with protein - Protein helps your muscles stay strong. Healthy foods with a lot of protein include chicken, fish, eggs, beans, nuts, and soy products. Red meat also has a lot of protein, but it also contains fats, which can be unhealthy.  Some experts recommend a "Mediterranean diet." This involves eating a lot of fruits, vegetables, nuts, whole grains, and olive oil. It also includes some fish, poultry, and dairy products, but not a lot of red meat. Eating this way can help your overall health, and might even lower your risk of having a stroke.  What foods should I avoid or limit? -- To eat a healthy diet, there are some things you should avoid or limit. They include:   Fats - There are different types of fats. Some " "types of fats are better for your body than others.  Trans fats are especially unhealthy. They are found in margarines, many fast foods, and some store-bought baked goods. Trans fats can raise your cholesterol level and your increase your chance of getting heart disease. You should avoid eating foods with these types of fats.  The type of "polyunsaturated" fats found in fish seems to be healthy and can reduce your chance of getting heart disease. Other polyunsaturated fats might also be good for your health. When you cook, it's best to use oils with healthier fats, such as olive oil and canola oil.  Sugar - To have a healthy diet, it's important to limit or avoid sugar, sweets, and refined grains. Refined grains are found in white bread, white rice, most forms of pasta, and most packaged "snack" foods. Whole grains, such as whole-wheat bread and brown rice, have more fiber and are better for your health.  Avoiding sugar-sweetened beverages, like soda and sports drinks, can also help improve your health.  Red meat - Studies have shown that eating a lot of red meat can increase your risk of certain health problems, including heart disease and cancer. You should limit the amount of red meat that you eat.  Can I drink alcohol as part of a healthy diet? -- People who drink a small amount of alcohol each day might have a lower chance of getting heart disease. But drinking alcohol can lead to problems. For example, it can raise a person's chances of getting liver disease and certain types of cancers. In women, even 1 drink a day can increase the risk of getting breast cancer.  Most doctors recommend that adult women not have more than 1 drink a day and that adult men not have more than 2 drinks a day. The limits are different because most women's bodies take longer to break down alcohol.  How many calories do I need each day? -- The number of calories you need each day depends on your weight, height, age, sex, and how " "active you are.  Your doctor or nurse can tell you how many calories you should eat each day. If you are trying to lose weight, you should eat fewer calories each day.  What if I have questions? -- If you have questions about which foods you should or should not eat, ask your doctor or nurse. The right diet for you will depend, in part, on your health and any medical conditions you have.  All topics are updated as new evidence becomes available and our peer review process is complete.  This topic retrieved from [employer] on: Sep 21, 2021.  Topic 12247 Version 20.0  Release: 29.4.2 - C29.263  © 2021 UpToDate, Inc. and/or its affiliates. All rights reserved.  figure 1: Nutrition label - fiber     This is an example of a nutrition label. To figure out how much fiber is in a food, look for the line that says "Dietary Fiber." It's also important to look at the serving size. This food has 7 grams of fiber in each serving, and each serving is 1 cup.  Graphic 39975 Version 7.0    figure 2: Foods and drinks with calcium and vitamin D     Foods rich in calcium include ice cream, soy milk, breads, kale, broccoli, milk, cheese, cottage cheese, almonds, yogurt, ready-to-eat cereals, beans, and tofu. Foods rich in vitamin D include milk, fortified plant-based "milks" (soy, almond), canned tuna fish, cod liver oil, yogurt, ready-to-eat-cereals, cooked salmon, canned sardines, mackerel, and eggs. Some of these foods are rich in both.  Graphic 65526 Version 4.0    Consumer Information Use and Disclaimer   This information is not specific medical advice and does not replace information you receive from your health care provider. This is only a brief summary of general information. It does NOT include all information about conditions, illnesses, injuries, tests, procedures, treatments, therapies, discharge instructions or life-style choices that may apply to you. You must talk with your health care provider for complete information " about your health and treatment options. This information should not be used to decide whether or not to accept your health care provider's advice, instructions or recommendations. Only your health care provider has the knowledge and training to provide advice that is right for you. The use of this information is governed by the [employer] End User License Agreement, available at https://www.Osteopl[employer]/en/solutions/[employer]aspring/about/joshua.The use of [employer] content is governed by the [employer] Terms of Use. ©2021 [employer] Inc. All rights reserved.  Copyright   © 2021 UpToDate, Inc. and/or its affiliates. All rights reserved.

## 2023-05-02 NOTE — Clinical Note
Hepatitis C Screening- declines COVID-19 Vaccine(1)- declines Pneumococcal Vaccines (Age 0-64)(1 - PCV)- wants to discuss w/ PCP HIV Screening- declines TETANUS VACCINE- declines High Dose Statin- at PCP's discretion  Hemoglobin A1c (Diabetic Prevention Screening)- done today Shingles Vaccine(1 of 2) - will obtain from [facility] Mammogram due on 07/23/2022- order placed.

## 2023-05-02 NOTE — PROGRESS NOTES
Katia Zepeda NP   [facility]  [address] [facility] [address]  [place]     PATIENT NAME: Kirti Morris  : 1970  DATE: 23  MRN: [id number]      Billing Provider: Katia Zepeda NP  Level of Service: [facility] PREVENTIVE VISIT,EST,40-64  Patient PCP Information       Provider PCP Type    Katia Zepeda NP General            Reason for Visit / Chief Complaint: Healthy You ([employer] of [place] HY: lipid/ glucose required. ), health maintenance (Hepatitis C Screening- declines/COVID-19 Vaccine(1)- declines/Pneumococcal Vaccines (Age 0-64)(1 - PCV)- wants to discuss w/ PCP/HIV Screening- declines/TETANUS VACCINE- declines/High Dose Statin- at PCP's discretion /Hemoglobin A1c (Diabetic Prevention Screening)- done today/Shingles Vaccine(1 of 2) - will obtain from [facility]/Mammogram due on 2022- order placed. ), Follow-up (3 week f/up: anxiety, depression), Anxiety, Depression, and Rash (First noticed Thursday. Possible spider bite to left arm/ elbow. Did not see spider. Multiple reddened, itchy areas noted. )       Update PCP  Update Chief Complaint         History of Present Illness / Problem Focused Workflow     Kirti Morris presents to the clinic for healty you visit, staetd that she has a rash on her left elbow and arm, thought it might be possible spider bite, multiple red irchy areas on arm near elbow, also here for eval of anxiety, stopped taking celexa due to scalp itching but stated that it was lice and she did not start med back      Review of Systems     Review of Systems   Constitutional:  Negative for chills, fatigue and fever.   HENT:  Negative for nasal congestion, ear pain, facial swelling, hearing loss, mouth dryness, mouth sores, postnasal drip, rhinorrhea, sinus pressure/congestion and goiter.    Eyes:  Negative for discharge and itching.   Respiratory:  Negative for cough, shortness of breath and wheezing.    Cardiovascular:  Negative for chest pain and leg swelling.   Gastrointestinal:   Negative for abdominal pain, change in bowel habit and change in bowel habit.   Genitourinary:  Negative for difficulty urinating, dysuria, enuresis, frequency, hematuria and urgency.   Integumentary:  Positive for rash.   Neurological:  Negative for dizziness, vertigo, syncope, weakness and headaches.   Psychiatric/Behavioral:  Negative for decreased concentration. The patient is nervous/anxious.       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    COPD (chronic obstructive pulmonary disease)     Coronary artery disease     acute MI 02/2014    CRF (chronic renal failure)     GERD (gastroesophageal reflux disease)     Hyperlipidemia     Hypertension     Myocardial infarction     Nonrheumatic mitral (valve) insufficiency 05/21/2018    Sleep apnea 01/06/2020    Stroke     Vitamin D deficiency 07/31/2020       Past Surgical History:   Procedure Laterality Date    CORONARY ANGIOPLASTY WITH STENT PLACEMENT  02/07/2014    HYSTERECTOMY      RADIOFREQUENCY ABLATION Right 10/13/2021    Procedure: RADIOFREQUENCY ABLATION;  Surgeon: David Combs MD;  Location: [facility];  Service: Pain Management;  Laterality: Right;  Right SI RFTC       Social History  Ms.  reports that she has been smoking cigarettes. She has been smoking an average of 1 pack per day. She has never used smokeless tobacco. She reports current alcohol use. She reports that she does not use drugs.    Family History  Ms.'s family history includes Arthritis in her mother; Ovarian cancer in her maternal aunt.    Medications and Allergies     Medications  Outpatient Medications Marked as Taking for the 5/2/23 encounter (Office Visit) with Katia Zepeda NP   Medication Sig Dispense Refill    albuterol (PROVENTIL) 2.5 mg /3 mL (0.083 %) nebulizer solution Take 3  mLs (2.5 mg total) by nebulization every 6 (six) hours as needed for Wheezing. Rescue 30 each 1    albuterol (PROVENTIL/VENTOLIN HFA) 90 mcg/actuation inhaler INHALE TWO PUFFS BY MOUTH TWICE DAILY FOR SHORTNESS OF BREATH / not more THAN FOUR TIMES DAILY 18 g 0    amLODIPine (NORVASC) 10 MG tablet Take 1 tablet (10 mg total) by mouth once daily. 30 tablet 3    ascorbic acid, vitamin C, (VITAMIN C) 500 MG tablet 500 mg.      aspirin (ECOTRIN) 81 MG EC tablet Take 81 mg by mouth once daily.      budesonide-formoterol 160-4.5 mcg (SYMBICORT) 160-4.5 mcg/actuation HFAA Inhale 2 puffs into the lungs every 12 (twelve) hours. Controller 10.2 g 0    cetirizine (ZYRTEC) 10 MG tablet Take 1 tablet (10 mg total) by mouth once daily. 90 tablet 1    cholecalciferol, vitamin D3, (VITAMIN D3) 250 mcg (10,000 unit) Cap TAKE ONE CAPSULE BY MOUTH ONE TIME DAILY 90 capsule 0    clopidogreL (PLAVIX) 75 mg tablet Take 1 tablet (75 mg total) by mouth once daily. 30 tablet 3    ezetimibe (ZETIA) 10 mg tablet Take 10 mg by mouth once daily.      gabapentin (NEURONTIN) 600 MG tablet TAKE ONE Tablet BY MOUTH THREE TIMES DAILY 90 tablet 1    HYDROcodone-acetaminophen (NORCO) 7.5-325 mg per tablet Take 1 tablet by mouth 2 (two) times daily as needed.      labetaloL (NORMODYNE) 200 MG tablet Take 1 tablet (200 mg total) by mouth 2 (two) times daily. 60 tablet 3    losartan (COZAAR) 50 MG tablet Take 1 tablet (50 mg total) by mouth once daily. 30 tablet 3    multivitamin/iron/folic acid (CENTRUM COMPLETE ORAL) Take by mouth Daily.      mv-mn/FA/bl coh/isoflav/jujube (ESTROVEN MENOPAUSE ORAL) Take by mouth once daily.      pantoprazole (PROTONIX) 40 MG tablet TAKE ONE Tablet BY MOUTH TWICE DAILY 60 tablet 3    REPATHA SURECLICK 140 mg/mL PnIj Inject 140 mg into the skin every 14 (fourteen) days.      tiZANidine (ZANAFLEX) 4 MG tablet TAKE ONE Tablet BY MOUTH EVERY 6 HOURS AS NEEDED FOR MUSCLE SPASMS / DO not take cyclobenzaprine while taking  "tizanidine 30 tablet 1    zinc gluconate 50 mg tablet Take 50 mg by mouth once daily. Takes as needed         Allergies  Review of patient's allergies indicates:   Allergen Reactions    Atorvastatin      LIPITOR    Dog dander      Dog hair & epithelia    House dust mite      D. Ptery, D. Farinae    Levsin [hyoscyamine sulfate]     Nuts [tree nut]      BRAZIL NUTS    Pravastatin     Sulfa (sulfonamide antibiotics)     Weed pollen-short ragweed        Physical Examination     Vitals:    05/02/23 1400   BP: 114/76   BP Location: Right arm   Patient Position: Sitting   BP Method: Large (Automatic)   Pulse: 70   Resp: 20   Temp: 98.2 °F (36.8 °C)   TempSrc: Oral   SpO2: 99%   Weight: 115.2 kg (254 lb)   Height: 5' 5" (1.651 m)      Physical Exam  Constitutional:       Appearance: Normal appearance.   HENT:      Head: Normocephalic.      Right Ear: Tympanic membrane, ear canal and external ear normal.      Left Ear: Tympanic membrane, ear canal and external ear normal.      Nose: Nose normal.      Mouth/Throat:      Mouth: Mucous membranes are moist.      Pharynx: Oropharynx is clear.   Eyes:      Extraocular Movements: Extraocular movements intact.      Conjunctiva/sclera: Conjunctivae normal.      Pupils: Pupils are equal, round, and reactive to light.   Cardiovascular:      Rate and Rhythm: Normal rate and regular rhythm.      Pulses: Normal pulses.      Heart sounds: Normal heart sounds.   Pulmonary:      Effort: Pulmonary effort is normal.      Breath sounds: Normal breath sounds.   Abdominal:      General: Bowel sounds are normal.      Palpations: Abdomen is soft.   Musculoskeletal:         General: Normal range of motion.   Skin:     General: Skin is warm and dry.      Capillary Refill: Capillary refill takes less than 2 seconds.      Comments: Several sm healing areas on left arm/elbow that resembles insect bites. No signs of infection   Neurological:      General: No focal deficit present.      Mental Status: She " is alert and oriented to person, place, and time.   Psychiatric:         Mood and Affect: Mood normal.         Behavior: Behavior normal.        Assessment and Plan (including Health Maintenance)      Problem List  Smart Sets  Document Outside HM   :    Plan:     Screening for diabetes mellitus  -     Hemoglobin A1C; Future; Expected date: 05/02/2023  -     Glucose, Fasting; Future; Expected date: 05/02/2023    Screening for lipoid disorders  -     Lipid Panel; Future; Expected date: 05/02/2023    Encounter for screening mammogram for breast cancer  -     Mammo Digital Screening Bilat; Future; Expected date: 05/02/2023    Hyperlipidemia, unspecified hyperlipidemia type  -     CBC Auto Differential; Future; Expected date: 05/02/2023  -     Comprehensive Metabolic Panel; Future; Expected date: 05/02/2023  -     Microalbumin/Creatinine Ratio, Urine; Future; Expected date: 05/02/2023    Essential hypertension  -     CBC Auto Differential; Future; Expected date: 05/02/2023  -     Comprehensive Metabolic Panel; Future; Expected date: 05/02/2023  -     Microalbumin/Creatinine Ratio, Urine; Future; Expected date: 05/02/2023            Health Maintenance Due   Topic Date Due    Hepatitis C Screening  Never done    COVID-19 Vaccine (1) Never done    Pneumococcal Vaccines (Age 0-64) (1 - PCV) Never done    HIV Screening  Never done    TETANUS VACCINE  Never done    High Dose Statin  Never done    Hemoglobin A1c (Diabetic Prevention Screening)  Never done    Shingles Vaccine (1 of 2) Never done    Mammogram  07/23/2022       Problem List Items Addressed This Visit          Cardiac/Vascular    Essential hypertension    Relevant Orders    CBC Auto Differential    Comprehensive Metabolic Panel    Microalbumin/Creatinine Ratio, Urine    Hyperlipidemia    Relevant Orders    CBC Auto Differential    Comprehensive Metabolic Panel    Microalbumin/Creatinine Ratio, Urine     Other Visit Diagnoses       Screening for diabetes mellitus     -  Primary    Relevant Orders    Hemoglobin A1C    Glucose, Fasting    Screening for lipoid disorders        Relevant Orders    Lipid Panel    Encounter for screening mammogram for breast cancer        Relevant Orders    Mammo Digital Screening Bilat              Health Maintenance Topics with due status: Not Due       Topic Last Completion Date    Influenza Vaccine 12/08/2021    Colorectal Cancer Screening 04/25/2022    Lipid Panel 08/04/2022       Procedures     Future Appointments   Date Time Provider Department Center   5/8/2023  1:15 PM Alonso Clark DO [facility]    5/7/2024 12:30 PM Katia Zepeda NP [facility] [place]        Follow up in 6 months (on 11/2/2023).       Signature:  Katia Zepeda NP    Date of encounter: 5/2/23

## 2023-05-03 ENCOUNTER — PATIENT OUTREACH (OUTPATIENT)
Dept: ADMINISTRATIVE | Facility: HOSPITAL | Age: 53
End: 2023-05-03

## 2023-05-03 NOTE — PROGRESS NOTES
Post visit Population Health review of encounter with date of service  5/2 with Duane.  All required HY components in encounter.    ..  Health Maintenance Due   Topic Date Due    Hepatitis C Screening  Never done    COVID-19 Vaccine (1) Never done    Pneumococcal Vaccines (Age 0-64) (1 - PCV) Never done    HIV Screening  Never done    TETANUS VACCINE  Never done    High Dose Statin  Never done    Hemoglobin A1c (Diabetic Prevention Screening)  Never done    Shingles Vaccine (1 of 2) Never done    Mammogram  07/23/2022

## 2023-05-04 ENCOUNTER — PATIENT OUTREACH (OUTPATIENT)
Dept: ADMINISTRATIVE | Facility: HOSPITAL | Age: 53
End: 2023-05-04

## 2023-05-04 NOTE — PROGRESS NOTES
Health maintenance record review for population health care gaps  Health maintenance reviewed with patient, patient declined health maintenance at this time with documentation from provider noted in chart.   5/2/23  AP Wheeler LPN  Hepatitis C Screening- declines   COVID-19 Vaccine(1)- declines   Pneumococcal Vaccines (Age 0-64)(1 - PCV)- wants to discuss w/ PCP   HIV Screening- declines   TETANUS VACCINE- declines   High Dose Statin- at PCP's discretion   Hemoglobin A1c (Diabetic Prevention Screening)- done today   Shingles Vaccine(1 of 2) - will obtain from [facility]   Mammogram due on 07/23/2022- order placed.     MAYTE sent to [facility] for vaccine records. Decline noted to chart

## 2023-05-04 NOTE — LETTER
AUTHORIZATION FOR RELEASE OF   CONFIDENTIAL INFORMATION    Dear [facility],    We are seeing Kirti Morris, date of birth 1970, in the clinic at [facility]. Katia Zepeda NP is the patient's PCP. Kirti Morris has an outstanding lab/procedure at the time we reviewed her chart. In order to help keep her health information updated, she has authorized us to request the following medical record(s):        (  )  MAMMOGRAM                                      (  )  COLONOSCOPY      (  )  PAP SMEAR                                          (  )  OUTSIDE LAB RESULTS     (  )  DEXA SCAN                                          (  )  EYE EXAM            (  )  FOOT EXAM                                          (  )  ENTIRE RECORD     ( X )  OUTSIDE IMMUNIZATIONS                 (  )  _______________         Please fax records to [facility], Glenda Barrett, NP, [phone]     If you have any questions, please [facility] at [phone].           Patient Name: Kirti Morris  : 1970  Patient Phone #: [phone]

## 2023-05-05 ENCOUNTER — TELEPHONE (OUTPATIENT)
Dept: SLEEP MEDICINE | Facility: CLINIC | Age: 53
End: 2023-05-05
Payer: COMMERCIAL

## 2023-05-05 ENCOUNTER — HOSPITAL ENCOUNTER (EMERGENCY)
Facility: HOSPITAL | Age: 53
Discharge: HOME | End: 2023-05-05
Payer: COMMERCIAL

## 2023-05-05 VITALS
OXYGEN SATURATION: 97 % | WEIGHT: 252 LBS | SYSTOLIC BLOOD PRESSURE: 132 MMHG | BODY MASS INDEX: 40.5 KG/M2 | HEIGHT: 66 IN | RESPIRATION RATE: 20 BRPM | DIASTOLIC BLOOD PRESSURE: 78 MMHG | HEART RATE: 77 BPM | TEMPERATURE: 99 F
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
## 2023-05-05 PROCEDURE — 99284 PR EMERGENCY DEPT VISIT,LEVEL IV: ICD-10-PCS | Mod: ,,, | Performed by: REGISTERED NURSE

## 2023-05-05 PROCEDURE — 99284 EMERGENCY DEPT VISIT MOD MDM: CPT | Mod: ,,, | Performed by: REGISTERED NURSE

## 2023-05-05 PROCEDURE — 63600175 PHARM REV CODE 636 W HCPCS: Performed by: REGISTERED NURSE

## 2023-05-05 PROCEDURE — 99284 EMERGENCY DEPT VISIT MOD MDM: CPT

## 2023-05-05 PROCEDURE — 96372 THER/PROPH/DIAG INJ SC/IM: CPT | Performed by: REGISTERED NURSE

## 2023-05-05 RX ORDER — CEPHALEXIN 500 MG/1
500 CAPSULE ORAL 4 TIMES DAILY
Qty: 20 CAPSULE | Refills: 0 | Status: SHIPPED | OUTPATIENT
Start: 2023-05-05 | End: 2023-05-10

## 2023-05-05 RX ORDER — KETOROLAC TROMETHAMINE 30 MG/ML
60 INJECTION, SOLUTION INTRAMUSCULAR; INTRAVENOUS
Status: COMPLETED | OUTPATIENT
Start: 2023-05-05 | End: 2023-05-05

## 2023-05-05 RX ORDER — FAMOTIDINE 20 MG/1
20 TABLET, FILM COATED ORAL 2 TIMES DAILY
COMMUNITY
End: 2023-07-18

## 2023-05-05 RX ADMIN — KETOROLAC TROMETHAMINE 60 MG: 30 INJECTION, SOLUTION INTRAMUSCULAR; INTRAVENOUS at 08:05

## 2023-05-06 NOTE — ED PROVIDER NOTES
Encounter Date: 5/5/2023       History     Chief Complaint   Patient presents with    Insect Bite     LEFT UPPER ARM     Kirti Morris is a 53 yo WF that presents with 5 quarter size areas to her posterior left arm that are erythematous, slightly raised, itch, and warm to touch.  States she has had similar places last week and believes it is spider bites.  States the pain is burning in nature and rates pain 4/10.  She denies fever or chills.    The history is provided by the patient.   Review of patient's allergies indicates:   Allergen Reactions    Atorvastatin      LIPITOR    Dog dander      Dog hair & epithelia    House dust mite      D. Ptery, D. 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    COPD (chronic obstructive pulmonary disease)     Coronary artery disease     acute MI 02/2014    CRF (chronic renal failure)     GERD (gastroesophageal reflux disease)     Hyperlipidemia     Hypertension     Myocardial infarction     Nonrheumatic mitral (valve) insufficiency 05/21/2018    Sleep apnea 01/06/2020    Stroke     Vitamin D deficiency 07/31/2020     Past Surgical History:   Procedure Laterality Date    CORONARY ANGIOPLASTY WITH STENT PLACEMENT  02/07/2014    HYSTERECTOMY      RADIOFREQUENCY ABLATION Right 10/13/2021    Procedure: RADIOFREQUENCY ABLATION;  Surgeon: David Combs MD;  Location: [facility];  Service: Pain Management;  Laterality: Right;  Right SI RFTC     Family History   Problem Relation Age of Onset    Ovarian cancer Maternal Aunt     Arthritis Mother      Social History     Tobacco Use    Smoking status: Every Day     Packs/day: 1.00     Types: Cigarettes    Smokeless  tobacco: Never    Tobacco comments:     Smoker since age 16. Never had a CT of chest   Substance Use Topics    Alcohol use: Yes     Comment: social    Drug use: Never     Review of Systems   Constitutional:  Negative for chills and fever.   Respiratory:  Negative for shortness of breath.    Cardiovascular:  Negative for chest pain.   Skin:  Positive for color change.   Neurological:  Negative for dizziness, weakness and headaches.   All other systems reviewed and are negative.    Physical Exam     Initial Vitals [05/05/23 1950]   BP Pulse Resp Temp SpO2   132/78 77 20 98.9 °F (37.2 °C) 97 %      MAP       --         Physical Exam    Constitutional: She appears well-developed and well-nourished. She is not diaphoretic. No distress.   HENT:   Head: Normocephalic and atraumatic.   Right Ear: External ear normal.   Left Ear: External ear normal.   Nose: Nose normal.   Mouth/Throat: Oropharynx is clear and moist. No oropharyngeal exudate.   Eyes: EOM are normal. Pupils are equal, round, and reactive to light.   Neck: Neck supple.   Normal range of motion.  Cardiovascular:  Normal rate, regular rhythm, normal heart sounds and intact distal pulses.           Pulmonary/Chest: Breath sounds normal. No respiratory distress.   Abdominal: Abdomen is soft. Bowel sounds are normal.   Musculoskeletal:         General: Normal range of motion.      Cervical back: Normal range of motion and neck supple.     Neurological: She is alert and oriented to person, place, and time. She has normal strength. GCS score is 15. GCS eye subscore is 4. GCS verbal subscore is 5. GCS motor subscore is 6.   Skin: Skin is warm, dry and intact. Capillary refill takes less than 2 seconds. Lesion noted. Rash is not pustular and not vesicular. There is erythema.        Psychiatric: She has a normal mood and affect. Her behavior is normal. Judgment and thought content normal.       Medical Screening Exam   See Full Note    ED Course   Procedures  Labs  Reviewed - No data to display       Imaging Results    None          Medications   ketorolac injection 60 mg (60 mg Intramuscular Given 5/5/23 2020)     Medical Decision Making:   ED Management:  -Areas look like insect bites of some type.  There is erythema, slight swelling, warmth and itching to the areas.  Pt states she is very prone to infections.  Pieter prescribe Keflex for 5 days                       Clinical Impression:   Final diagnoses:  [W57.XXXA] Bug bite with infection, initial encounter (Primary)        ED Disposition Condition    Discharge Stable          ED Prescriptions       Medication Sig Dispense Start Date End Date Auth. Provider    cephALEXin (KEFLEX) 500 MG capsule Take 1 capsule (500 mg total) by mouth 4 (four) times daily. for 5 days 20 capsule 5/5/2023 5/10/2023 FUAD Hernandez          Follow-up Information       Follow up With Specialties Details Why Contact Info    Katia Zepeda NP Family Medicine On 5/8/2023 If symptoms worsen [address]  [facility] [place] [address]  [phone]               FUAD Hernandez  05/05/23 2044

## 2023-05-06 NOTE — DISCHARGE INSTRUCTIONS
-Benadryl 25 mg every 6 hours for itching as needed  -Cool compresses to area  -Ibuprofen as needed for  pain  -Antibiotic as prescribed

## 2023-05-06 NOTE — ED TRIAGE NOTES
52 O FE TO ER WITH C/O SPIDER BITES TO LEFT UPPER ARM, 4 WHELPS WITH SLIGHT REDNESS NOTED DAVIE LEFT UPPER ARM

## 2023-05-08 ENCOUNTER — OFFICE VISIT (OUTPATIENT)
Dept: SLEEP MEDICINE | Facility: CLINIC | Age: 53
End: 2023-05-08
Payer: COMMERCIAL

## 2023-05-08 VITALS
WEIGHT: 251.13 LBS | BODY MASS INDEX: 41.84 KG/M2 | HEIGHT: 65 IN | SYSTOLIC BLOOD PRESSURE: 93 MMHG | HEART RATE: 72 BPM | OXYGEN SATURATION: 97 % | DIASTOLIC BLOOD PRESSURE: 61 MMHG

## 2023-05-08 DIAGNOSIS — I10 ESSENTIAL HYPERTENSION: ICD-10-CM

## 2023-05-08 DIAGNOSIS — G47.33 OSA ON CPAP: Primary | ICD-10-CM

## 2023-05-08 DIAGNOSIS — J44.9 CHRONIC OBSTRUCTIVE PULMONARY DISEASE, UNSPECIFIED COPD TYPE: ICD-10-CM

## 2023-05-08 PROCEDURE — 3074F PR MOST RECENT SYSTOLIC BLOOD PRESSURE < 130 MM HG: ICD-10-PCS | Mod: ,,, | Performed by: FAMILY MEDICINE

## 2023-05-08 PROCEDURE — 99213 PR OFFICE/OUTPT VISIT, EST, LEVL III, 20-29 MIN: ICD-10-PCS | Mod: S$PBB,,, | Performed by: FAMILY MEDICINE

## 2023-05-08 PROCEDURE — 1160F RVW MEDS BY RX/DR IN RCRD: CPT | Mod: ,,, | Performed by: FAMILY MEDICINE

## 2023-05-08 PROCEDURE — 3078F DIAST BP <80 MM HG: CPT | Mod: ,,, | Performed by: FAMILY MEDICINE

## 2023-05-08 PROCEDURE — 3008F PR BODY MASS INDEX (BMI) DOCUMENTED: ICD-10-PCS | Mod: ,,, | Performed by: FAMILY MEDICINE

## 2023-05-08 PROCEDURE — 1160F PR REVIEW ALL MEDS BY PRESCRIBER/CLIN PHARMACIST DOCUMENTED: ICD-10-PCS | Mod: ,,, | Performed by: FAMILY MEDICINE

## 2023-05-08 PROCEDURE — 1159F PR MEDICATION LIST DOCUMENTED IN MEDICAL RECORD: ICD-10-PCS | Mod: ,,, | Performed by: FAMILY MEDICINE

## 2023-05-08 PROCEDURE — 4010F PR ACE/ARB THEARPY RXD/TAKEN: ICD-10-PCS | Mod: ,,, | Performed by: FAMILY MEDICINE

## 2023-05-08 PROCEDURE — 3074F SYST BP LT 130 MM HG: CPT | Mod: ,,, | Performed by: FAMILY MEDICINE

## 2023-05-08 PROCEDURE — 4010F ACE/ARB THERAPY RXD/TAKEN: CPT | Mod: ,,, | Performed by: FAMILY MEDICINE

## 2023-05-08 PROCEDURE — 99215 OFFICE O/P EST HI 40 MIN: CPT | Mod: PBBFAC | Performed by: FAMILY MEDICINE

## 2023-05-08 PROCEDURE — 1159F MED LIST DOCD IN RCRD: CPT | Mod: ,,, | Performed by: FAMILY MEDICINE

## 2023-05-08 PROCEDURE — 3008F BODY MASS INDEX DOCD: CPT | Mod: ,,, | Performed by: FAMILY MEDICINE

## 2023-05-08 PROCEDURE — 3078F PR MOST RECENT DIASTOLIC BLOOD PRESSURE < 80 MM HG: ICD-10-PCS | Mod: ,,, | Performed by: FAMILY MEDICINE

## 2023-05-08 PROCEDURE — 99213 OFFICE O/P EST LOW 20 MIN: CPT | Mod: S$PBB,,, | Performed by: FAMILY MEDICINE

## 2023-05-08 NOTE — PROGRESS NOTES
Patient presents to clinic for CPAP F/U. ESS is 15. Patient gets supplies from [facility] in [place]. Patient wears a full face mask.

## 2023-05-08 NOTE — PROGRESS NOTES
Subjective     Patient ID: Kirti Morris is a 52 y.o. female.    Chief Complaint: Sleep Apnea (CPAP management)    Patient is seen in sleep clinic for CPAP management and has a new CPAP machine.  Compliance is 33.3% with an average AHI 6.1 at a pressure of 11 cm H2O.  [place] score is 15 and patient uses a fullface mask.  The patient states she feels like she smothering at times.  Patient's initial AHI was 99 with a low O2 saturation of 84%.  I will change patient's CPAP to auto PAP and ask her to follow-up in 1 month.    Review of Systems   Constitutional:  Positive for fatigue.        Negative EDS   Respiratory:  Positive for apnea.    Psychiatric/Behavioral:  Positive for sleep disturbance.         Objective     Physical Exam  Constitutional:       Appearance: She is obese.   Cardiovascular:      Rate and Rhythm: Normal rate and regular rhythm.      Heart sounds: No murmur heard.  Pulmonary:      Breath sounds: Normal breath sounds.   Skin:     General: Skin is warm and dry.   Neurological:      Mental Status: She is alert and oriented to person, place, and time.          Assessment and Plan     Problem List Items Addressed This Visit          Pulmonary    Chronic obstructive pulmonary disease       Cardiac/Vascular    Essential hypertension       Other    ZAHEER on CPAP - Primary       Change CPAP to auto CPAP @ 11-20 cm H20  Caution while operating a motor vehicle  Weight loss management  Prescription for new supplies  Follow up sleep clinic in 1 month.

## 2023-05-09 ENCOUNTER — TELEPHONE (OUTPATIENT)
Dept: EMERGENCY MEDICINE | Facility: HOSPITAL | Age: 53
End: 2023-05-09
Payer: COMMERCIAL

## 2023-05-23 ENCOUNTER — OFFICE VISIT (OUTPATIENT)
Dept: FAMILY MEDICINE | Facility: CLINIC | Age: 53
End: 2023-05-23
Payer: COMMERCIAL

## 2023-05-23 ENCOUNTER — HOSPITAL ENCOUNTER (OUTPATIENT)
Dept: RADIOLOGY | Facility: HOSPITAL | Age: 53
Discharge: HOME | End: 2023-05-23
Attending: NURSE PRACTITIONER
Payer: COMMERCIAL

## 2023-05-23 VITALS
HEIGHT: 65 IN | WEIGHT: 249 LBS | DIASTOLIC BLOOD PRESSURE: 77 MMHG | OXYGEN SATURATION: 98 % | BODY MASS INDEX: 41.48 KG/M2 | HEART RATE: 63 BPM | SYSTOLIC BLOOD PRESSURE: 114 MMHG | RESPIRATION RATE: 18 BRPM | TEMPERATURE: 99 F

## 2023-05-23 DIAGNOSIS — Z13.220 SCREENING FOR LIPOID DISORDERS: ICD-10-CM

## 2023-05-23 DIAGNOSIS — E78.5 HYPERLIPIDEMIA, UNSPECIFIED HYPERLIPIDEMIA TYPE: ICD-10-CM

## 2023-05-23 DIAGNOSIS — M79.671 RIGHT FOOT PAIN: Primary | ICD-10-CM

## 2023-05-23 DIAGNOSIS — R07.9 CHEST PAIN, UNSPECIFIED TYPE: ICD-10-CM

## 2023-05-23 DIAGNOSIS — I69.319 CVA, OLD, COGNITIVE DEFICITS: ICD-10-CM

## 2023-05-23 DIAGNOSIS — I10 ESSENTIAL HYPERTENSION: ICD-10-CM

## 2023-05-23 DIAGNOSIS — Z13.1 SCREENING FOR DIABETES MELLITUS: ICD-10-CM

## 2023-05-23 DIAGNOSIS — M79.671 RIGHT FOOT PAIN: ICD-10-CM

## 2023-05-23 LAB
ALBUMIN SERPL BCP-MCNC: 3.6 G/DL (ref 3.5–5)
ALBUMIN/GLOB SERPL: 1.1 {RATIO}
ALP SERPL-CCNC: 84 U/L (ref 41–108)
ALT SERPL W P-5'-P-CCNC: 22 U/L (ref 13–56)
ANION GAP SERPL CALCULATED.3IONS-SCNC: 4 MMOL/L (ref 7–16)
AST SERPL W P-5'-P-CCNC: 16 U/L (ref 15–37)
BASOPHILS # BLD AUTO: 0.05 K/UL (ref 0–0.2)
BASOPHILS NFR BLD AUTO: 0.7 % (ref 0–1)
BILIRUB SERPL-MCNC: 0.4 MG/DL (ref ?–1.2)
BUN SERPL-MCNC: 11 MG/DL (ref 7–18)
BUN/CREAT SERPL: 6 (ref 6–20)
CALCIUM SERPL-MCNC: 9.2 MG/DL (ref 8.5–10.1)
CHLORIDE SERPL-SCNC: 111 MMOL/L (ref 98–107)
CHOLEST SERPL-MCNC: 148 MG/DL (ref 0–200)
CHOLEST/HDLC SERPL: 3.1 {RATIO}
CO2 SERPL-SCNC: 27 MMOL/L (ref 21–32)
CREAT SERPL-MCNC: 1.98 MG/DL (ref 0.55–1.02)
CREAT UR-MCNC: 215 MG/DL (ref 28–219)
DIFFERENTIAL METHOD BLD: ABNORMAL
EGFR (NO RACE VARIABLE) (RUSH/TITUS): 30 ML/MIN/1.73M2
EOSINOPHIL # BLD AUTO: 0.18 K/UL (ref 0–0.5)
EOSINOPHIL NFR BLD AUTO: 2.7 % (ref 1–4)
ERYTHROCYTE [DISTWIDTH] IN BLOOD BY AUTOMATED COUNT: 13.1 % (ref 11.5–14.5)
EST. AVERAGE GLUCOSE BLD GHB EST-MCNC: 97 MG/DL
GLOBULIN SER-MCNC: 3.4 G/DL (ref 2–4)
GLUCOSE SERPL-MCNC: 110 MG/DL (ref 74–106)
GLUCOSE SERPL-MCNC: 110 MG/DL (ref 74–106)
HBA1C MFR BLD HPLC: 5.5 % (ref 4.5–6.6)
HCT VFR BLD AUTO: 42.5 % (ref 38–47)
HDLC SERPL-MCNC: 47 MG/DL (ref 40–60)
HGB BLD-MCNC: 13.6 G/DL (ref 12–16)
IMM GRANULOCYTES # BLD AUTO: 0.01 K/UL (ref 0–0.04)
IMM GRANULOCYTES NFR BLD: 0.1 % (ref 0–0.4)
LDLC SERPL CALC-MCNC: 45 MG/DL
LDLC/HDLC SERPL: 1 {RATIO}
LYMPHOCYTES # BLD AUTO: 1.96 K/UL (ref 1–4.8)
LYMPHOCYTES NFR BLD AUTO: 29.1 % (ref 27–41)
MCH RBC QN AUTO: 31.4 PG (ref 27–31)
MCHC RBC AUTO-ENTMCNC: 32 G/DL (ref 32–36)
MCV RBC AUTO: 98.2 FL (ref 80–96)
MICROALBUMIN UR-MCNC: 1 MG/DL (ref 0–2.8)
MICROALBUMIN/CREAT RATIO PNL UR: 4.7 MG/G (ref 0–30)
MONOCYTES # BLD AUTO: 0.39 K/UL (ref 0–0.8)
MONOCYTES NFR BLD AUTO: 5.8 % (ref 2–6)
MPC BLD CALC-MCNC: 11.5 FL (ref 9.4–12.4)
NEUTROPHILS # BLD AUTO: 4.14 K/UL (ref 1.8–7.7)
NEUTROPHILS NFR BLD AUTO: 61.6 % (ref 53–65)
NONHDLC SERPL-MCNC: 101 MG/DL
NRBC # BLD AUTO: 0 X10E3/UL
NRBC, AUTO (.00): 0 %
PLATELET # BLD AUTO: 292 K/UL (ref 150–400)
POTASSIUM SERPL-SCNC: 4.9 MMOL/L (ref 3.5–5.1)
PROT SERPL-MCNC: 7 G/DL (ref 6.4–8.2)
RBC # BLD AUTO: 4.33 M/UL (ref 4.2–5.4)
SODIUM SERPL-SCNC: 137 MMOL/L (ref 136–145)
TRIGL SERPL-MCNC: 281 MG/DL (ref 35–150)
VLDLC SERPL-MCNC: 56 MG/DL
WBC # BLD AUTO: 6.73 K/UL (ref 4.5–11)

## 2023-05-23 PROCEDURE — 80053 COMPREHEN METABOLIC PANEL: CPT | Mod: ,,, | Performed by: CLINICAL MEDICAL LABORATORY

## 2023-05-23 PROCEDURE — 1159F MED LIST DOCD IN RCRD: CPT | Mod: ,,, | Performed by: NURSE PRACTITIONER

## 2023-05-23 PROCEDURE — 3078F DIAST BP <80 MM HG: CPT | Mod: ,,, | Performed by: NURSE PRACTITIONER

## 2023-05-23 PROCEDURE — 99214 OFFICE O/P EST MOD 30 MIN: CPT | Mod: ,,, | Performed by: NURSE PRACTITIONER

## 2023-05-23 PROCEDURE — 85025 COMPLETE CBC W/AUTO DIFF WBC: CPT | Mod: ,,, | Performed by: CLINICAL MEDICAL LABORATORY

## 2023-05-23 PROCEDURE — 1159F PR MEDICATION LIST DOCUMENTED IN MEDICAL RECORD: ICD-10-PCS | Mod: ,,, | Performed by: NURSE PRACTITIONER

## 2023-05-23 PROCEDURE — 3074F PR MOST RECENT SYSTOLIC BLOOD PRESSURE < 130 MM HG: ICD-10-PCS | Mod: ,,, | Performed by: NURSE PRACTITIONER

## 2023-05-23 PROCEDURE — 4010F ACE/ARB THERAPY RXD/TAKEN: CPT | Mod: ,,, | Performed by: NURSE PRACTITIONER

## 2023-05-23 PROCEDURE — 3008F BODY MASS INDEX DOCD: CPT | Mod: ,,, | Performed by: NURSE PRACTITIONER

## 2023-05-23 PROCEDURE — 3008F PR BODY MASS INDEX (BMI) DOCUMENTED: ICD-10-PCS | Mod: ,,, | Performed by: NURSE PRACTITIONER

## 2023-05-23 PROCEDURE — 3074F SYST BP LT 130 MM HG: CPT | Mod: ,,, | Performed by: NURSE PRACTITIONER

## 2023-05-23 PROCEDURE — 82043 UR ALBUMIN QUANTITATIVE: CPT | Mod: ,,, | Performed by: CLINICAL MEDICAL LABORATORY

## 2023-05-23 PROCEDURE — 80053 COMPREHENSIVE METABOLIC PANEL: ICD-10-PCS | Mod: ,,, | Performed by: CLINICAL MEDICAL LABORATORY

## 2023-05-23 PROCEDURE — 73620 X-RAY EXAM OF FOOT: CPT | Mod: TC,RT

## 2023-05-23 PROCEDURE — 99214 PR OFFICE/OUTPT VISIT, EST, LEVL IV, 30-39 MIN: ICD-10-PCS | Mod: ,,, | Performed by: NURSE PRACTITIONER

## 2023-05-23 PROCEDURE — 83036 HEMOGLOBIN A1C: ICD-10-PCS | Mod: ,,, | Performed by: CLINICAL MEDICAL LABORATORY

## 2023-05-23 PROCEDURE — 82570 ASSAY OF URINE CREATININE: CPT | Mod: ,,, | Performed by: CLINICAL MEDICAL LABORATORY

## 2023-05-23 PROCEDURE — 83036 HEMOGLOBIN GLYCOSYLATED A1C: CPT | Mod: ,,, | Performed by: CLINICAL MEDICAL LABORATORY

## 2023-05-23 PROCEDURE — 3078F PR MOST RECENT DIASTOLIC BLOOD PRESSURE < 80 MM HG: ICD-10-PCS | Mod: ,,, | Performed by: NURSE PRACTITIONER

## 2023-05-23 PROCEDURE — 4010F PR ACE/ARB THEARPY RXD/TAKEN: ICD-10-PCS | Mod: ,,, | Performed by: NURSE PRACTITIONER

## 2023-05-23 PROCEDURE — 80061 LIPID PANEL: CPT | Mod: ,,, | Performed by: CLINICAL MEDICAL LABORATORY

## 2023-05-23 PROCEDURE — 82570 MICROALBUMIN / CREATININE RATIO URINE: ICD-10-PCS | Mod: ,,, | Performed by: CLINICAL MEDICAL LABORATORY

## 2023-05-23 PROCEDURE — 82043 MICROALBUMIN / CREATININE RATIO URINE: ICD-10-PCS | Mod: ,,, | Performed by: CLINICAL MEDICAL LABORATORY

## 2023-05-23 PROCEDURE — 85025 CBC WITH DIFFERENTIAL: ICD-10-PCS | Mod: ,,, | Performed by: CLINICAL MEDICAL LABORATORY

## 2023-05-23 PROCEDURE — 80061 LIPID PANEL: ICD-10-PCS | Mod: ,,, | Performed by: CLINICAL MEDICAL LABORATORY

## 2023-05-23 RX ORDER — DULOXETIN HYDROCHLORIDE 60 MG/1
60 CAPSULE, DELAYED RELEASE ORAL DAILY
Qty: 30 CAPSULE | Refills: 5 | Status: SHIPPED | OUTPATIENT
Start: 2023-05-23 | End: 2023-12-12 | Stop reason: SDUPTHER

## 2023-05-23 NOTE — ASSESSMENT & PLAN NOTE
Take asa if has chest pain, refer to dr tello for eval, tgo to er if needed, return to clinic as scheduled, take meds as ordered

## 2023-05-23 NOTE — PROGRESS NOTES
Katia Zepeda NP   [facility]  [address] [facility] [address]  [place]     PATIENT NAME: Kirti Morris  : 1970  DATE: 23  MRN: [id number]      Billing Provider: Katia Zepeda NP  Level of Service:   Patient PCP Information       Provider PCP Type    Katia Zepeda NP General            Reason for Visit / Chief Complaint: Chest Pain, Foot Injury, Medication Refill (Consultation about Cymbalta refill. Has slight chest pain on right side. Consultation about physical therapy for right foot as it hurts to move sometimes not sure if its arthritis. Consultation about B12 shot as she has been fatigue.), Health Maintenance (Hepatitis C Screening Decline/COVID-19 Vaccine(1) Decline/Pneumococcal Vaccines (Age 0-64)(1 - PCV) Decline/HIV Screening Decline/TETANUS VACCINE Decline/High Dose Statin Decline/Hemoglobin A1c (Diabetic Prevention Screening) Decline/Shingles Vaccine(1 of 2) Decline/Mammogram Decline), and Fatigue       Update PCP  Update Chief Complaint         History of Present Illness / Problem Focused Workflow     Kirti Morris presents to the clinic c/o 3 week f/u of anxiety, stated doesn't thing cymbalta ,has not been taking 60mg as ordered, will increase, also stated that she has been having chest pain on right upper chest, not like flutter, can just feel a dull pain, rates pain 3-4 at its worse, does not occ at all times. No pain at time, had episode this am before coming, does last long, usuall last maybe just a min and can occur 3-4 times in 10 min. Stated she is having it every day. Gets sob and feels week when doing things. Sees dr tello for eval,Last visit was    Right foot has been hurting since last day of therapy, pain across tallus, feels like it is cracking a lot, has tendency to walk on ball foot or tiptoes. Pt has such vague symptoms, stated chest just has a pain, when I questioned if sharp, dull constant, she finally said I guess dull,       Review of Systems     Review of Systems    Cardiovascular:  Positive for chest pain (at intervals).   Musculoskeletal:         Pain of right foot   Psychiatric/Behavioral:  The patient is nervous/anxious.       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    COPD (chronic obstructive pulmonary disease)     Coronary artery disease     acute MI 02/2014    CRF (chronic renal failure)     GERD (gastroesophageal reflux disease)     Hyperlipidemia     Hypertension     Myocardial infarction     Nonrheumatic mitral (valve) insufficiency 05/21/2018    Sleep apnea 01/06/2020    Stroke     Vitamin D deficiency 07/31/2020       Past Surgical History:   Procedure Laterality Date    CORONARY ANGIOPLASTY WITH STENT PLACEMENT  02/07/2014    HYSTERECTOMY      RADIOFREQUENCY ABLATION Right 10/13/2021    Procedure: RADIOFREQUENCY ABLATION;  Surgeon: David Combs MD;  Location: [facility];  Service: Pain Management;  Laterality: Right;  Right SI RFTC       Social History  Ms.  reports that she has been smoking cigarettes. She has been smoking an average of 1 pack per day. She has never used smokeless tobacco. She reports current alcohol use. She reports that she does not use drugs.    Family History  Ms.'s family history includes Arthritis in her mother; Ovarian cancer in her maternal aunt.    Medications and Allergies     Medications  Outpatient Medications Marked as Taking for the 5/23/23 encounter (Office Visit) with Katia Zepeda NP   Medication Sig Dispense Refill    albuterol (PROVENTIL) 2.5 mg /3 mL (0.083 %) nebulizer solution Take 3 mLs (2.5 mg total) by nebulization every 6 (six) hours as needed for Wheezing. Rescue 30 each 1    albuterol (PROVENTIL/VENTOLIN HFA) 90 mcg/actuation inhaler INHALE TWO PUFFS BY MOUTH TWICE DAILY FOR SHORTNESS OF BREATH / not more THAN  FOUR TIMES DAILY 18 g 0    amLODIPine (NORVASC) 10 MG tablet Take 1 tablet (10 mg total) by mouth once daily. 30 tablet 3    ascorbic acid, vitamin C, (VITAMIN C) 500 MG tablet 500 mg.      aspirin (ECOTRIN) 81 MG EC tablet Take 81 mg by mouth once daily.      budesonide-formoterol 160-4.5 mcg (SYMBICORT) 160-4.5 mcg/actuation HFAA Inhale 2 puffs into the lungs every 12 (twelve) hours. Controller 10.2 g 0    cetirizine (ZYRTEC) 10 MG tablet Take 1 tablet (10 mg total) by mouth once daily. 90 tablet 1    cholecalciferol, vitamin D3, (VITAMIN D3) 250 mcg (10,000 unit) Cap TAKE ONE CAPSULE BY MOUTH ONE TIME DAILY 90 capsule 0    clopidogreL (PLAVIX) 75 mg tablet Take 1 tablet (75 mg total) by mouth once daily. 30 tablet 3    diphenhydrAMINE-acetaminophen (TYLENOL PM)  mg Tab Take 1 tablet by mouth nightly as needed.      ezetimibe (ZETIA) 10 mg tablet Take 10 mg by mouth once daily.      famotidine (PEPCID) 20 MG tablet Take 20 mg by mouth 2 (two) times daily.      fluticasone propionate (FLONASE) 50 mcg/actuation nasal spray 1 spray (50 mcg total) by Each Nostril route once daily. 15 g 0    gabapentin (NEURONTIN) 600 MG tablet TAKE ONE Tablet BY MOUTH THREE TIMES DAILY 90 tablet 1    HYDROcodone-acetaminophen (NORCO) 7.5-325 mg per tablet Take 1 tablet by mouth 2 (two) times daily as needed.      ibuprofen (ADVIL,MOTRIN) 600 MG tablet Take 600 mg by mouth every 8 (eight) hours as needed for Pain.      labetaloL (NORMODYNE) 200 MG tablet Take 1 tablet (200 mg total) by mouth 2 (two) times daily. 60 tablet 3    losartan (COZAAR) 50 MG tablet Take 1 tablet (50 mg total) by mouth once daily. 30 tablet 3    multivitamin/iron/folic acid (CENTRUM COMPLETE ORAL) Take by mouth Daily.      mv-mn/FA/bl coh/isoflav/jujube (ESTROVEN MENOPAUSE ORAL) Take by mouth once daily.      nitroGLYCERIN (NITROSTAT) 0.4 MG SL tablet Place 0.4 mg under the tongue every 5 (five) minutes as needed.      pantoprazole (PROTONIX) 40 MG tablet  "TAKE ONE Tablet BY MOUTH TWICE DAILY 60 tablet 3    REPATHA SURECLICK 140 mg/mL PnIj Inject 140 mg into the skin every 14 (fourteen) days.      tiZANidine (ZANAFLEX) 4 MG tablet TAKE ONE Tablet BY MOUTH EVERY 6 HOURS AS NEEDED FOR MUSCLE SPASMS / DO not take cyclobenzaprine while taking tizanidine 30 tablet 1    zinc gluconate 50 mg tablet Take 50 mg by mouth once daily. Takes as needed      [DISCONTINUED] DULoxetine (CYMBALTA) 30 MG capsule 1 capsule daily x 2 weeks then may increase to 2 po every am if needed 45 capsule 0       Allergies  Review of patient's allergies indicates:   Allergen Reactions    Atorvastatin      LIPITOR    Dog dander      Dog hair & epithelia    House dust mite      D. Ptery, D. Farinae    Levsin [hyoscyamine sulfate]     Nuts [tree nut]      BRAZIL NUTS    Pravastatin     Sulfa (sulfonamide antibiotics)     Weed pollen-short ragweed        Physical Examination     Vitals:    05/23/23 1050   BP: 114/77   BP Location: Right arm   Patient Position: Sitting   Pulse: 63   Resp: 18   Temp: 99 °F (37.2 °C)   TempSrc: Oral   SpO2: 98%   Weight: 112.9 kg (249 lb)   Height: 5' 5" (1.651 m)      Physical Exam  Vitals and nursing note reviewed.   Constitutional:       Appearance: Normal appearance.   HENT:      Head: Normocephalic.      Right Ear: Tympanic membrane, ear canal and external ear normal.      Left Ear: Tympanic membrane, ear canal and external ear normal.      Nose: Nose normal.      Mouth/Throat:      Mouth: Mucous membranes are moist.      Pharynx: Oropharynx is clear.   Eyes:      Extraocular Movements: Extraocular movements intact.      Conjunctiva/sclera: Conjunctivae normal.      Pupils: Pupils are equal, round, and reactive to light.   Cardiovascular:      Rate and Rhythm: Normal rate and regular rhythm.      Pulses: Normal pulses.      Heart sounds: Normal heart sounds.   Pulmonary:      Effort: Pulmonary effort is normal.      Breath sounds: Normal breath sounds.   Abdominal:    "   General: Bowel sounds are normal.      Palpations: Abdomen is soft.   Musculoskeletal:         General: Tenderness (mild tenderness over right tallous, full rom of foot) present. Normal range of motion.   Skin:     General: Skin is warm and dry.      Capillary Refill: Capillary refill takes less than 2 seconds.   Neurological:      General: No focal deficit present.      Mental Status: She is alert and oriented to person, place, and time.   Psychiatric:         Mood and Affect: Mood normal.         Behavior: Behavior normal.        Assessment and Plan (including Health Maintenance)      Problem List  Smart Sets  Document Outside HM   :    Plan:     Right foot pain  -     X-Ray Foot 2 View Right; Future; Expected date: 05/23/2023    Screening for diabetes mellitus  -     Hemoglobin A1C  -     Glucose, Fasting    Screening for lipoid disorders  -     Lipid Panel    Hyperlipidemia, unspecified hyperlipidemia type  -     CBC Auto Differential  -     Comprehensive Metabolic Panel  -     Microalbumin/Creatinine Ratio, Urine    Essential hypertension  -     CBC Auto Differential  -     Comprehensive Metabolic Panel  -     Microalbumin/Creatinine Ratio, Urine  -     Ambulatory referral/consult to Cardiology; Future; Expected date: 05/23/2023    CVA, old, cognitive deficits  -     Ambulatory referral/consult to Neurology; Future; Expected date: 05/30/2023    Chest pain, unspecified type  -     Ambulatory referral/consult to Cardiology; Future; Expected date: 05/23/2023    Other orders  -     DULoxetine (CYMBALTA) 60 MG capsule; Take 1 capsule (60 mg total) by mouth once daily.  Dispense: 30 capsule; Refill: 5            Health Maintenance Due   Topic Date Due    Hepatitis C Screening  Never done    COVID-19 Vaccine (1) Never done    Pneumococcal Vaccines (Age 0-64) (1 - PCV) Never done    HIV Screening  Never done    TETANUS VACCINE  Never done    High Dose Statin  Never done    Hemoglobin A1c (Diabetic Prevention  Screening)  Never done    Shingles Vaccine (1 of 2) Never done    Mammogram  07/23/2022       Problem List Items Addressed This Visit          Neuro    CVA, old, cognitive deficits    Relevant Orders    Ambulatory referral/consult to Neurology       Cardiac/Vascular    Essential hypertension    Current Assessment & Plan     meds as ordered, keep all scheduled miesha, return to clinic as needed, exercise daily, lose wt.           Relevant Orders    Ambulatory referral/consult to Cardiology    Hyperlipidemia    Current Assessment & Plan     Low fat low chol dit, exercise daily, lose wt           Screening for lipoid disorders    Chest pain    Current Assessment & Plan     Take asa if has chest pain, refer to dr tello for eval, tgo to er if needed, return to clinic as scheduled, take meds as ordered           Relevant Orders    Ambulatory referral/consult to Cardiology       Endocrine    Screening for diabetes mellitus       Orthopedic    Right foot pain - Primary    Current Assessment & Plan     Go to [facility] for xray, elevate, alternate tylenol and motrin every 4 hours as needed for pain, alternate heat and cold to foot several times per day, lose wt.            Relevant Orders    X-Ray Foot 2 View Right         Health Maintenance Topics with due status: Not Due       Topic Last Completion Date    Influenza Vaccine 12/08/2021    Colorectal Cancer Screening 04/25/2022    Lipid Panel 08/04/2022       Procedures     Future Appointments   Date Time Provider Department Center   6/26/2023 10:00 AM Alonso Clark DO [facility] SLEEP [facility]   8/23/2023  1:00 PM [facility] [place]   11/1/2023  2:15 PM [facility] [place]   5/7/2024 12:30 PM Katia Zepeda NP [facility]        Follow up in about 3 months (around 8/23/2023).       Signature:  Katia Zepeda NP    Date of encounter: 5/23/23

## 2023-05-23 NOTE — ASSESSMENT & PLAN NOTE
Go to [facility] for [facility], elevate, alternate tylenol and motrin every 4 hours as needed for pain, alternate heat and cold to foot several times per day, lose wt.

## 2023-05-23 NOTE — Clinical Note
Hepatitis C Screening Decline COVID-19 Vaccine(1) Decline Pneumococcal Vaccines (Age 0-64)(1 - PCV) Decline HIV Screening Decline TETANUS VACCINE Decline High Dose Statin Decline Hemoglobin A1c (Diabetic Prevention Screening) Decline Shingles Vaccine(1 of 2) Decline Mammogram Decline

## 2023-05-24 NOTE — PROGRESS NOTES
Notfy that cr is sl elevated, increase fluids, triglycerides are high, watch low fat low chol diet, needs to be on lopid 600mg bid and recheck liver enzymes and lipids in 1 month

## 2023-05-26 DIAGNOSIS — E78.2 ELEVATED TRIGLYCERIDES WITH HIGH CHOLESTEROL: Primary | ICD-10-CM

## 2023-05-30 ENCOUNTER — PATIENT OUTREACH (OUTPATIENT)
Dept: ADMINISTRATIVE | Facility: HOSPITAL | Age: 53
End: 2023-05-30

## 2023-05-30 RX ORDER — GEMFIBROZIL 600 MG/1
600 TABLET, FILM COATED ORAL
Qty: 180 TABLET | Refills: 3 | Status: SHIPPED | OUTPATIENT
Start: 2023-05-30 | End: 2024-01-10

## 2023-05-31 ENCOUNTER — TELEPHONE (OUTPATIENT)
Dept: FAMILY MEDICINE | Facility: CLINIC | Age: 53
End: 2023-05-31
Payer: COMMERCIAL

## 2023-05-31 NOTE — TELEPHONE ENCOUNTER
Patient called and stated that she took her newly prescribed Lopid this morning after eating her cereal and taking her other medications. Patient states she has been sick at her stomach and just not feeling well, also has a metallic taste in her mouth. Patient was also asking about her appointment with Dr Conte related to chest pain because she has had several episodes and she is worried something is going on. Patient instructed since she was established with Dr Conte she could call over there and explain her symptoms to her staff and they may get her in faster than what the whole referral process takes, patient verbalized understanding. Discussed patient's concerns with Katia Zepeda [facility], she verbalized for patient to hold medication if it is making her feel that way and discuss with Dr Conte. Patient notified and verbalized understanding.

## 2023-06-23 ENCOUNTER — TELEPHONE (OUTPATIENT)
Dept: SLEEP MEDICINE | Facility: CLINIC | Age: 53
End: 2023-06-23
Payer: COMMERCIAL

## 2023-06-27 RX ORDER — GABAPENTIN 600 MG/1
TABLET ORAL
Qty: 90 TABLET | Refills: 1 | Status: SHIPPED | OUTPATIENT
Start: 2023-06-27 | End: 2023-08-29

## 2023-07-09 ENCOUNTER — HOSPITAL ENCOUNTER (EMERGENCY)
Facility: HOSPITAL | Age: 53
Discharge: HOME | End: 2023-07-09
Payer: COMMERCIAL

## 2023-07-09 DIAGNOSIS — R07.9 CHEST PAIN: ICD-10-CM

## 2023-07-09 LAB
ALBUMIN SERPL BCP-MCNC: 3.6 G/DL (ref 3.5–5)
ALBUMIN/GLOB SERPL: 0.9 {RATIO}
ALP SERPL-CCNC: 96 U/L (ref 41–108)
ALT SERPL W P-5'-P-CCNC: 26 U/L (ref 13–56)
ANION GAP SERPL CALCULATED.3IONS-SCNC: 17 MMOL/L (ref 7–16)
AST SERPL W P-5'-P-CCNC: 32 U/L (ref 15–37)
BASOPHILS # BLD AUTO: 0.03 K/UL (ref 0–0.2)
BASOPHILS NFR BLD AUTO: 0.4 % (ref 0–1)
BILIRUB SERPL-MCNC: 0.3 MG/DL (ref ?–1.2)
BUN SERPL-MCNC: 17 MG/DL (ref 7–18)
BUN/CREAT SERPL: 8 (ref 6–20)
CALCIUM SERPL-MCNC: 9.3 MG/DL (ref 8.5–10.1)
CHLORIDE SERPL-SCNC: 104 MMOL/L (ref 98–107)
CO2 SERPL-SCNC: 21 MMOL/L (ref 21–32)
CREAT SERPL-MCNC: 2.11 MG/DL (ref 0.55–1.02)
DIFFERENTIAL METHOD BLD: ABNORMAL
EGFR (NO RACE VARIABLE) (RUSH/TITUS): 28 ML/MIN/1.73M2
EOSINOPHIL # BLD AUTO: 0.19 K/UL (ref 0–0.5)
EOSINOPHIL NFR BLD AUTO: 2.5 % (ref 1–4)
ERYTHROCYTE [DISTWIDTH] IN BLOOD BY AUTOMATED COUNT: 13.2 % (ref 11.5–14.5)
GLOBULIN SER-MCNC: 3.8 G/DL (ref 2–4)
GLUCOSE SERPL-MCNC: 146 MG/DL (ref 74–106)
HCT VFR BLD AUTO: 42.2 % (ref 38–47)
HGB BLD-MCNC: 13.9 G/DL (ref 12–16)
LYMPHOCYTES # BLD AUTO: 3.53 K/UL (ref 1–4.8)
LYMPHOCYTES NFR BLD AUTO: 46.3 % (ref 27–41)
MAGNESIUM SERPL-MCNC: 2 MG/DL (ref 1.7–2.3)
MCH RBC QN AUTO: 31.9 PG (ref 27–31)
MCHC RBC AUTO-ENTMCNC: 32.9 G/DL (ref 32–36)
MCV RBC AUTO: 96.8 FL (ref 80–96)
MONOCYTES # BLD AUTO: 0.44 K/UL (ref 0–0.8)
MONOCYTES NFR BLD AUTO: 5.8 % (ref 2–6)
MPC BLD CALC-MCNC: 11.2 FL (ref 9.4–12.4)
NEUTROPHILS # BLD AUTO: 3.44 K/UL (ref 1.8–7.7)
NEUTROPHILS NFR BLD AUTO: 45 % (ref 53–65)
PLATELET # BLD AUTO: 256 K/UL (ref 150–400)
POTASSIUM SERPL-SCNC: 4.5 MMOL/L (ref 3.5–5.1)
PROT SERPL-MCNC: 7.4 G/DL (ref 6.4–8.2)
RBC # BLD AUTO: 4.36 M/UL (ref 4.2–5.4)
SODIUM SERPL-SCNC: 137 MMOL/L (ref 136–145)
TROPONIN I SERPL DL<=0.01 NG/ML-MCNC: 7.8 PG/ML
WBC # BLD AUTO: 7.63 K/UL (ref 4.5–11)

## 2023-07-09 PROCEDURE — 99285 PR EMERGENCY DEPT VISIT,LEVEL V: ICD-10-PCS | Mod: ,,, | Performed by: REGISTERED NURSE

## 2023-07-09 PROCEDURE — 93010 EKG 12-LEAD: ICD-10-PCS | Mod: ,,, | Performed by: STUDENT IN AN ORGANIZED HEALTH CARE EDUCATION/TRAINING PROGRAM

## 2023-07-09 PROCEDURE — 80053 COMPREHEN METABOLIC PANEL: CPT | Performed by: REGISTERED NURSE

## 2023-07-09 PROCEDURE — 63600175 PHARM REV CODE 636 W HCPCS: Performed by: REGISTERED NURSE

## 2023-07-09 PROCEDURE — 99285 EMERGENCY DEPT VISIT HI MDM: CPT | Mod: ,,, | Performed by: REGISTERED NURSE

## 2023-07-09 PROCEDURE — 85025 COMPLETE CBC W/AUTO DIFF WBC: CPT | Performed by: REGISTERED NURSE

## 2023-07-09 PROCEDURE — 96375 TX/PRO/DX INJ NEW DRUG ADDON: CPT

## 2023-07-09 PROCEDURE — 93010 ELECTROCARDIOGRAM REPORT: CPT | Mod: ,,, | Performed by: STUDENT IN AN ORGANIZED HEALTH CARE EDUCATION/TRAINING PROGRAM

## 2023-07-09 PROCEDURE — 84484 ASSAY OF TROPONIN QUANT: CPT | Performed by: REGISTERED NURSE

## 2023-07-09 PROCEDURE — 25000003 PHARM REV CODE 250: Performed by: REGISTERED NURSE

## 2023-07-09 PROCEDURE — 93005 ELECTROCARDIOGRAM TRACING: CPT

## 2023-07-09 PROCEDURE — 83735 ASSAY OF MAGNESIUM: CPT | Performed by: REGISTERED NURSE

## 2023-07-09 PROCEDURE — 99285 EMERGENCY DEPT VISIT HI MDM: CPT | Mod: 25

## 2023-07-09 PROCEDURE — 96374 THER/PROPH/DIAG INJ IV PUSH: CPT

## 2023-07-09 RX ORDER — LIDOCAINE HYDROCHLORIDE 20 MG/ML
15 SOLUTION OROPHARYNGEAL ONCE
Status: COMPLETED | OUTPATIENT
Start: 2023-07-09 | End: 2023-07-09

## 2023-07-09 RX ORDER — MAG HYDROX/ALUMINUM HYD/SIMETH 200-200-20
30 SUSPENSION, ORAL (FINAL DOSE FORM) ORAL ONCE
Status: COMPLETED | OUTPATIENT
Start: 2023-07-09 | End: 2023-07-09

## 2023-07-09 RX ORDER — ONDANSETRON 2 MG/ML
4 INJECTION INTRAMUSCULAR; INTRAVENOUS
Status: COMPLETED | OUTPATIENT
Start: 2023-07-09 | End: 2023-07-09

## 2023-07-09 RX ORDER — KETOROLAC TROMETHAMINE 30 MG/ML
15 INJECTION, SOLUTION INTRAMUSCULAR; INTRAVENOUS
Status: COMPLETED | OUTPATIENT
Start: 2023-07-09 | End: 2023-07-09

## 2023-07-09 RX ORDER — HYDROXYZINE PAMOATE 25 MG/1
25 CAPSULE ORAL EVERY 6 HOURS PRN
Qty: 12 CAPSULE | Refills: 0 | Status: SHIPPED | OUTPATIENT
Start: 2023-07-09 | End: 2023-07-12

## 2023-07-09 RX ORDER — KETOROLAC TROMETHAMINE 30 MG/ML
INJECTION, SOLUTION INTRAMUSCULAR; INTRAVENOUS
Status: DISCONTINUED
Start: 2023-07-09 | End: 2023-07-09 | Stop reason: WASHOUT

## 2023-07-09 RX ORDER — MORPHINE SULFATE 4 MG/ML
4 INJECTION, SOLUTION INTRAMUSCULAR; INTRAVENOUS
Status: COMPLETED | OUTPATIENT
Start: 2023-07-09 | End: 2023-07-09

## 2023-07-09 RX ADMIN — MORPHINE SULFATE 4 MG: 4 INJECTION, SOLUTION INTRAMUSCULAR; INTRAVENOUS at 10:07

## 2023-07-09 RX ADMIN — LIDOCAINE HYDROCHLORIDE 15 ML: 20 SOLUTION ORAL at 10:07

## 2023-07-09 RX ADMIN — ALUMINUM HYDROXIDE, MAGNESIUM HYDROXIDE, AND SIMETHICONE 30 ML: 200; 200; 20 SUSPENSION ORAL at 10:07

## 2023-07-09 RX ADMIN — ONDANSETRON 4 MG: 2 INJECTION INTRAMUSCULAR; INTRAVENOUS at 10:07

## 2023-07-09 RX ADMIN — KETOROLAC TROMETHAMINE 15 MG: 30 INJECTION, SOLUTION INTRAMUSCULAR; INTRAVENOUS at 10:07

## 2023-07-10 VITALS
TEMPERATURE: 99 F | SYSTOLIC BLOOD PRESSURE: 105 MMHG | HEART RATE: 91 BPM | RESPIRATION RATE: 18 BRPM | OXYGEN SATURATION: 96 % | DIASTOLIC BLOOD PRESSURE: 80 MMHG | HEIGHT: 65 IN | BODY MASS INDEX: 39.15 KG/M2 | WEIGHT: 235 LBS

## 2023-07-10 NOTE — DISCHARGE INSTRUCTIONS
Follow up with Dr. Conte as discussed. Vistaril up to 4 times a day as needed for anxiety. Continue meds as directed.

## 2023-07-10 NOTE — ED TRIAGE NOTES
Patient arrived to the ER c/o of chest pain that started more than 3 hours ago. Patient stated the pain feeling like tightness and going across both shoulders. Patient stated she took 81mg of Aspirin. Patient has a hx of stents.

## 2023-07-12 ENCOUNTER — TELEPHONE (OUTPATIENT)
Dept: EMERGENCY MEDICINE | Facility: HOSPITAL | Age: 53
End: 2023-07-12
Payer: COMMERCIAL

## 2023-07-12 NOTE — TELEPHONE ENCOUNTER
PT STATES SHE IS DOING OK. HAS ANOTHER EPISODE SIMILAR TO THE ONE LIKE WHEN SHE CAME TO ER. STATES SHE CALLED DR LI AND IS SEEING HIM TOMORROW.

## 2023-07-18 RX ORDER — FAMOTIDINE 20 MG/1
TABLET, FILM COATED ORAL
Qty: 60 TABLET | Refills: 5 | Status: SHIPPED | OUTPATIENT
Start: 2023-07-18 | End: 2023-12-12 | Stop reason: SDUPTHER

## 2023-08-07 PROBLEM — Z13.1 SCREENING FOR DIABETES MELLITUS: Status: RESOLVED | Noted: 2023-05-02 | Resolved: 2023-08-07

## 2023-08-07 PROBLEM — Z00.00 ENCOUNTER FOR PREVENTIVE CARE: Status: RESOLVED | Noted: 2023-05-02 | Resolved: 2023-08-07

## 2023-08-07 PROBLEM — Z13.220 SCREENING FOR LIPOID DISORDERS: Status: RESOLVED | Noted: 2023-05-02 | Resolved: 2023-08-07

## 2023-08-23 ENCOUNTER — OFFICE VISIT (OUTPATIENT)
Dept: FAMILY MEDICINE | Facility: CLINIC | Age: 53
End: 2023-08-23
Payer: COMMERCIAL

## 2023-08-23 VITALS
BODY MASS INDEX: 38.99 KG/M2 | TEMPERATURE: 98 F | HEART RATE: 79 BPM | WEIGHT: 234 LBS | RESPIRATION RATE: 16 BRPM | HEIGHT: 65 IN | OXYGEN SATURATION: 98 % | DIASTOLIC BLOOD PRESSURE: 88 MMHG | SYSTOLIC BLOOD PRESSURE: 116 MMHG

## 2023-08-23 DIAGNOSIS — J44.9 CHRONIC OBSTRUCTIVE PULMONARY DISEASE, UNSPECIFIED COPD TYPE: ICD-10-CM

## 2023-08-23 DIAGNOSIS — K21.9 GASTROESOPHAGEAL REFLUX DISEASE WITHOUT ESOPHAGITIS: Primary | ICD-10-CM

## 2023-08-23 DIAGNOSIS — J31.0 CHRONIC RHINITIS: ICD-10-CM

## 2023-08-23 PROBLEM — M79.605 PAIN IN BOTH LOWER EXTREMITIES: Status: RESOLVED | Noted: 2022-08-04 | Resolved: 2023-08-23

## 2023-08-23 PROBLEM — M79.604 PAIN IN BOTH LOWER EXTREMITIES: Status: RESOLVED | Noted: 2022-08-04 | Resolved: 2023-08-23

## 2023-08-23 PROBLEM — G89.29 CHRONIC LEFT-SIDED LOW BACK PAIN WITHOUT SCIATICA: Status: RESOLVED | Noted: 2022-08-04 | Resolved: 2023-08-23

## 2023-08-23 PROBLEM — S50.362A INSECT BITE OF LEFT ELBOW: Status: RESOLVED | Noted: 2023-05-02 | Resolved: 2023-08-23

## 2023-08-23 PROBLEM — R89.9 ABNORMAL LABORATORY TEST RESULT: Status: RESOLVED | Noted: 2022-03-17 | Resolved: 2023-08-23

## 2023-08-23 PROBLEM — M25.552 LEFT HIP PAIN: Status: RESOLVED | Noted: 2022-05-27 | Resolved: 2023-08-23

## 2023-08-23 PROBLEM — R53.83 FATIGUE: Status: RESOLVED | Noted: 2022-08-04 | Resolved: 2023-08-23

## 2023-08-23 PROBLEM — M54.50 CHRONIC LEFT-SIDED LOW BACK PAIN WITHOUT SCIATICA: Status: RESOLVED | Noted: 2022-08-04 | Resolved: 2023-08-23

## 2023-08-23 PROBLEM — M79.671 RIGHT FOOT PAIN: Status: RESOLVED | Noted: 2023-05-23 | Resolved: 2023-08-23
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
## 2023-08-23 PROBLEM — R10.84 GENERALIZED ABDOMINAL PAIN: Status: RESOLVED | Noted: 2022-01-27 | Resolved: 2023-08-23
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
## 2023-08-23 PROCEDURE — 3061F NEG MICROALBUMINURIA REV: CPT | Mod: ,,, | Performed by: NURSE PRACTITIONER

## 2023-08-23 PROCEDURE — 4010F PR ACE/ARB THEARPY RXD/TAKEN: ICD-10-PCS | Mod: ,,, | Performed by: NURSE PRACTITIONER

## 2023-08-23 PROCEDURE — 3079F DIAST BP 80-89 MM HG: CPT | Mod: ,,, | Performed by: NURSE PRACTITIONER

## 2023-08-23 PROCEDURE — 3074F PR MOST RECENT SYSTOLIC BLOOD PRESSURE < 130 MM HG: ICD-10-PCS | Mod: ,,, | Performed by: NURSE PRACTITIONER

## 2023-08-23 PROCEDURE — 99214 OFFICE O/P EST MOD 30 MIN: CPT | Mod: ,,, | Performed by: NURSE PRACTITIONER

## 2023-08-23 PROCEDURE — 3008F PR BODY MASS INDEX (BMI) DOCUMENTED: ICD-10-PCS | Mod: ,,, | Performed by: NURSE PRACTITIONER

## 2023-08-23 PROCEDURE — 3066F PR DOCUMENTATION OF TREATMENT FOR NEPHROPATHY: ICD-10-PCS | Mod: ,,, | Performed by: NURSE PRACTITIONER

## 2023-08-23 PROCEDURE — 3066F NEPHROPATHY DOC TX: CPT | Mod: ,,, | Performed by: NURSE PRACTITIONER

## 2023-08-23 PROCEDURE — 3079F PR MOST RECENT DIASTOLIC BLOOD PRESSURE 80-89 MM HG: ICD-10-PCS | Mod: ,,, | Performed by: NURSE PRACTITIONER

## 2023-08-23 PROCEDURE — 3044F PR MOST RECENT HEMOGLOBIN A1C LEVEL <7.0%: ICD-10-PCS | Mod: ,,, | Performed by: NURSE PRACTITIONER

## 2023-08-23 PROCEDURE — 99214 PR OFFICE/OUTPT VISIT, EST, LEVL IV, 30-39 MIN: ICD-10-PCS | Mod: ,,, | Performed by: NURSE PRACTITIONER

## 2023-08-23 PROCEDURE — 3008F BODY MASS INDEX DOCD: CPT | Mod: ,,, | Performed by: NURSE PRACTITIONER

## 2023-08-23 PROCEDURE — 1159F PR MEDICATION LIST DOCUMENTED IN MEDICAL RECORD: ICD-10-PCS | Mod: ,,, | Performed by: NURSE PRACTITIONER

## 2023-08-23 PROCEDURE — 3044F HG A1C LEVEL LT 7.0%: CPT | Mod: ,,, | Performed by: NURSE PRACTITIONER

## 2023-08-23 PROCEDURE — 3074F SYST BP LT 130 MM HG: CPT | Mod: ,,, | Performed by: NURSE PRACTITIONER

## 2023-08-23 PROCEDURE — 1159F MED LIST DOCD IN RCRD: CPT | Mod: ,,, | Performed by: NURSE PRACTITIONER

## 2023-08-23 PROCEDURE — 4010F ACE/ARB THERAPY RXD/TAKEN: CPT | Mod: ,,, | Performed by: NURSE PRACTITIONER

## 2023-08-23 PROCEDURE — 3061F PR NEG MICROALBUMINURIA RESULT DOCUMENTED/REVIEW: ICD-10-PCS | Mod: ,,, | Performed by: NURSE PRACTITIONER

## 2023-08-23 RX ORDER — EPINEPHRINE 0.3 MG/.3ML
1 INJECTION SUBCUTANEOUS ONCE
Qty: 0.3 ML | Refills: 0 | Status: SHIPPED | OUTPATIENT
Start: 2023-08-23 | End: 2024-02-26

## 2023-08-23 RX ORDER — BUDESONIDE AND FORMOTEROL FUMARATE DIHYDRATE 160; 4.5 UG/1; UG/1
2 AEROSOL RESPIRATORY (INHALATION) EVERY 12 HOURS
Qty: 10.2 G | Refills: 0 | Status: SHIPPED | OUTPATIENT
Start: 2023-08-23 | End: 2023-10-02 | Stop reason: SDUPTHER

## 2023-08-23 RX ORDER — HYDROXYZINE PAMOATE 25 MG/1
25 CAPSULE ORAL EVERY 8 HOURS PRN
Qty: 30 CAPSULE | Refills: 5 | Status: SHIPPED | OUTPATIENT
Start: 2023-08-23 | End: 2023-12-12 | Stop reason: SDUPTHER

## 2023-08-23 NOTE — PROGRESS NOTES
"   Katia Zepeda NP   [facility]  [address]  [place]     PATIENT NAME: Kirti Morris  : 1970  DATE: 23  MRN: [id number]      Billing Provider: Katia Zepeda NP  Level of Service:   Patient PCP Information       Provider PCP Type    Katia Zepeda NP General            Reason for Visit / Chief Complaint: Follow-up (3 month follow up. ), Medication Question (Patient states she was prescribed Vistaril at [facility] on 2023 but the medication was "lost" and she did not take them. Patient requesting a new prescription for Vistaril.), Skin Tags (Patient has recurrent skin tag on left jaw. Patient stated it was burned off once before and took years to resurface. Patient requesting referral to get it removed again.), and GI Problem (Patient states she would like a referral to Dr. Gerson Green (Gastroenterologist).)       Update PCP  Update Chief Complaint         History of Present Illness / Problem Focused Workflow     Kirti Morris presents to the clinic with numerous complaints, pt did not mention skin tags as this was not on her list of complaints that she had on her phone, stated that she needs rx for vistartil as this was given to her when she went to er for chest apin, and they thought it wouldhelp with anxiety but she lost rx, wants referral to dr mckeon for evalof her gerd, thjis this may be chest pain problem, also wants handicap sticker as she has been having some sob wth ambulating and right hip pain, has hx of chroinic bronchitis and wants to know if it will get better, still smoking and stated that she is going to try patches to stop, I told her that she needed at least to use 2 otc products togetheradn that she needed to also try gum but stated she could not use it. C/o about heel spurs and stated that some dr told her that her wt was casuing her heels to hurt so bad and wanted to know if that was true, I explained to her that heel spurs hurts due to all ov the wt bearing on " them       Review of Systems     Review of Systems   Constitutional:  Negative for chills, fatigue and fever.   HENT:  Negative for nasal congestion, ear pain, facial swelling, hearing loss, mouth dryness, mouth sores, postnasal drip, rhinorrhea, sinus pressure/congestion and goiter.    Eyes:  Negative for discharge and itching.   Respiratory:  Positive for shortness of breath. Negative for cough and wheezing.    Cardiovascular:  Negative for chest pain and leg swelling.   Gastrointestinal:  Positive for reflux. Negative for abdominal pain, change in bowel habit and change in bowel habit.   Genitourinary:  Negative for difficulty urinating, dysuria, enuresis, frequency, hematuria and urgency.   Neurological:  Negative for dizziness, vertigo, syncope, weakness and headaches.   Psychiatric/Behavioral:  Negative for decreased concentration.         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    COPD (chronic obstructive pulmonary disease)     Coronary artery disease     acute MI 02/2014    CRF (chronic renal failure)     GERD (gastroesophageal reflux disease)     Hyperlipidemia     Hypertension     Myocardial infarction     Nonrheumatic mitral (valve) insufficiency 05/21/2018    Sleep apnea 01/06/2020    Stroke     Vitamin D deficiency 07/31/2020       Past Surgical History:   Procedure Laterality Date    CORONARY ANGIOPLASTY WITH STENT PLACEMENT  02/07/2014    HYSTERECTOMY      RADIOFREQUENCY ABLATION Right 10/13/2021    Procedure: RADIOFREQUENCY ABLATION;  Surgeon: David Combs MD;  Location: [facility];  Service: Pain Management;  Laterality: Right;  Right SI RFTC       Social History  Ms.  reports that she has been smoking cigarettes. She has never used smokeless tobacco. She reports current alcohol use. She  reports that she does not use drugs.    Family History  Ms.'s family history includes Arthritis in her mother; Ovarian cancer in her maternal aunt.    Medications and Allergies     Medications  Outpatient Medications Marked as Taking for the 8/23/23 encounter (Office Visit) with Katia Zepeda NP   Medication Sig Dispense Refill    albuterol (PROVENTIL) 2.5 mg /3 mL (0.083 %) nebulizer solution Take 3 mLs (2.5 mg total) by nebulization every 6 (six) hours as needed for Wheezing. Rescue 30 each 1    albuterol (PROVENTIL/VENTOLIN HFA) 90 mcg/actuation inhaler INHALE TWO PUFFS BY MOUTH TWICE DAILY FOR SHORTNESS OF BREATH / not more THAN FOUR TIMES DAILY 18 g 0    amLODIPine (NORVASC) 10 MG tablet Take 1 tablet (10 mg total) by mouth once daily. 30 tablet 3    ascorbic acid, vitamin C, (VITAMIN C) 500 MG tablet 500 mg.      aspirin (ECOTRIN) 81 MG EC tablet Take 81 mg by mouth once daily.      cetirizine (ZYRTEC) 10 MG tablet Take 1 tablet (10 mg total) by mouth once daily. 90 tablet 1    cholecalciferol, vitamin D3, (VITAMIN D3) 250 mcg (10,000 unit) Cap TAKE ONE CAPSULE BY MOUTH ONE TIME DAILY 90 capsule 0    clopidogreL (PLAVIX) 75 mg tablet Take 1 tablet (75 mg total) by mouth once daily. 30 tablet 3    diphenhydrAMINE-acetaminophen (TYLENOL PM)  mg Tab Take 1 tablet by mouth nightly as needed.      DULoxetine (CYMBALTA) 60 MG capsule Take 1 capsule (60 mg total) by mouth once daily. 30 capsule 5    ezetimibe (ZETIA) 10 mg tablet Take 10 mg by mouth once daily.      famotidine (PEPCID) 20 MG tablet TAKE ONE Tablet BY MOUTH TWICE DAILY 60 tablet 5    fluticasone propionate (FLONASE) 50 mcg/actuation nasal spray 1 spray (50 mcg total) by Each Nostril route once daily. 15 g 0    gabapentin (NEURONTIN) 600 MG tablet TAKE ONE Tablet BY MOUTH THREE TIMES DAILY 90 tablet 1    labetaloL (NORMODYNE) 200 MG tablet Take 1 tablet (200 mg total) by mouth 2 (two) times daily. 60 tablet 3    losartan (COZAAR) 50 MG tablet  "Take 1 tablet (50 mg total) by mouth once daily. 30 tablet 3    multivitamin/iron/folic acid (CENTRUM COMPLETE ORAL) Take by mouth Daily.      mv-mn/FA/bl coh/isoflav/jujube (ESTROVEN MENOPAUSE ORAL) Take by mouth once daily.      nitroGLYCERIN (NITROSTAT) 0.4 MG SL tablet Place 0.4 mg under the tongue every 5 (five) minutes as needed.      pantoprazole (PROTONIX) 40 MG tablet TAKE ONE Tablet BY MOUTH TWICE DAILY 60 tablet 3    REPATHA SURECLICK 140 mg/mL PnIj Inject 140 mg into the skin every 14 (fourteen) days.      tiZANidine (ZANAFLEX) 4 MG tablet TAKE ONE Tablet BY MOUTH EVERY 6 HOURS AS NEEDED FOR MUSCLE SPASMS / DO not take cyclobenzaprine while taking tizanidine 30 tablet 1    zinc gluconate 50 mg tablet Take 50 mg by mouth once daily. Takes as needed      [DISCONTINUED] budesonide-formoterol 160-4.5 mcg (SYMBICORT) 160-4.5 mcg/actuation HFAA Inhale 2 puffs into the lungs every 12 (twelve) hours. Controller 10.2 g 0    [DISCONTINUED] EPINEPHrine (EPIPEN) 0.3 mg/0.3 mL AtIn Inject 0.3 mLs (0.3 mg total) into the muscle once. for 1 dose 0.3 mL 0       Allergies  Review of patient's allergies indicates:   Allergen Reactions    Atorvastatin      LIPITOR    Dog dander      Dog hair & epithelia    House dust mite      D. Ptery, D. Farinae    Levsin [hyoscyamine sulfate]     Nuts [tree nut]      BRAZIL NUTS    Pravastatin     Sulfa (sulfonamide antibiotics)     Weed pollen-short ragweed        Physical Examination     Vitals:    08/23/23 1318   BP: 116/88   BP Location: Right arm   Patient Position: Sitting   BP Method: Large (Manual)   Pulse: 79   Resp: 16   Temp: 98.3 °F (36.8 °C)   TempSrc: Oral   SpO2: 98%   Weight: 106.1 kg (234 lb)   Height: 5' 5" (1.651 m)      Physical Exam  Vitals and nursing note reviewed.   Constitutional:       Appearance: Normal appearance. She is obese.   HENT:      Head: Normocephalic.      Right Ear: Tympanic membrane, ear canal and external ear normal.      Left Ear: Tympanic " membrane, ear canal and external ear normal.      Nose: Nose normal.      Mouth/Throat:      Mouth: Mucous membranes are moist.      Pharynx: Oropharynx is clear.   Eyes:      Extraocular Movements: Extraocular movements intact.      Conjunctiva/sclera: Conjunctivae normal.      Pupils: Pupils are equal, round, and reactive to light.   Cardiovascular:      Rate and Rhythm: Normal rate and regular rhythm.      Pulses: Normal pulses.      Heart sounds: Normal heart sounds.   Pulmonary:      Effort: Pulmonary effort is normal.      Breath sounds: Normal breath sounds.   Abdominal:      General: Bowel sounds are normal.      Palpations: Abdomen is soft.   Musculoskeletal:         General: Normal range of motion.      Cervical back: Normal range of motion and neck supple.   Skin:     General: Skin is warm and dry.      Capillary Refill: Capillary refill takes less than 2 seconds.   Neurological:      General: No focal deficit present.      Mental Status: She is alert and oriented to person, place, and time.   Psychiatric:         Mood and Affect: Mood normal.         Behavior: Behavior normal.          Assessment and Plan (including Health Maintenance)      Problem List  Smart Sets  Document Outside HM   :    Plan:     Gastroesophageal reflux disease without esophagitis  -     Ambulatory referral/consult to Gastroenterology; Future; Expected date: 08/30/2023    Chronic rhinitis  -     EPINEPHrine (EPIPEN) 0.3 mg/0.3 mL AtIn; Inject 0.3 mLs (0.3 mg total) into the muscle once. for 1 dose  Dispense: 0.3 mL; Refill: 0    Chronic obstructive pulmonary disease, unspecified COPD type    Other orders  -     budesonide-formoterol 160-4.5 mcg (SYMBICORT) 160-4.5 mcg/actuation HFAA; Inhale 2 puffs into the lungs every 12 (twelve) hours. Controller  Dispense: 10.2 g; Refill: 0  -     hydrOXYzine pamoate (VISTARIL) 25 MG Cap; Take 1 capsule (25 mg total) by mouth every 8 (eight) hours as needed (anxiety).  Dispense: 30 capsule;  Refill: 5            Health Maintenance Due   Topic Date Due    Hepatitis C Screening  Never done    COVID-19 Vaccine (1) Never done    Pneumococcal Vaccines (Age 0-64) (1 - PCV) Never done    HIV Screening  Never done    TETANUS VACCINE  Never done    High Dose Statin  Never done    Shingles Vaccine (1 of 2) Never done    Mammogram  07/23/2022       Problem List Items Addressed This Visit          ENT    Chronic rhinitis    Current Assessment & Plan     Avoid irritatns, meds as ordered, return to clinic as needed and scheduled         Relevant Medications    EPINEPHrine (EPIPEN) 0.3 mg/0.3 mL AtIn       Pulmonary    Chronic obstructive pulmonary disease    Current Assessment & Plan     Needs to stop smoking, meds as ordered, keep all scheudled miesha            GI    Gastroesophageal reflux disease without esophagitis - Primary    Current Assessment & Plan     Elevate Head of bed  Do not eat at least 2 hours before bedtime  Avoid caffeine, nicotine, etoh, and spicy foods  Eat small frequent feedings throughout the day,   Meds as ordered,  Return to clinic as needed             Relevant Orders    Ambulatory referral/consult to Gastroenterology         Health Maintenance Topics with due status: Not Due       Topic Last Completion Date    Influenza Vaccine 12/08/2021    Colorectal Cancer Screening 04/25/2022    Hemoglobin A1c (Diabetic Prevention Screening) 05/23/2023    Lipid Panel 05/23/2023       Procedures     Future Appointments   Date Time Provider Department Center   9/25/2023 10:45 AM Alonso Clark DO [facility] SLEEP [place]    11/2/2023  2:20 PM Ana Pollock NP [facility] [place]   5/7/2024 12:30 PM Katia Zepeda NP [facility] [place]        Follow up in about 3 months (around 11/23/2023) for fasting lab due at next visit.       Signature:  Katia Zepeda NP    Date of encounter: 8/23/23

## 2023-08-28 ENCOUNTER — PATIENT OUTREACH (OUTPATIENT)
Dept: ADMINISTRATIVE | Facility: HOSPITAL | Age: 53
End: 2023-08-28

## 2023-08-28 PROBLEM — I69.319 CVA, OLD, COGNITIVE DEFICITS: Status: RESOLVED | Noted: 2023-05-23 | Resolved: 2023-08-28

## 2023-08-28 NOTE — PROGRESS NOTES
.Population Health Review...  Working [facility]/HY spreadsheet of denied claims  All biometrics/wellness plans in [facility] website-5/2/2023  Can be resubmitted

## 2023-08-29 RX ORDER — GABAPENTIN 600 MG/1
TABLET ORAL
Qty: 90 TABLET | Refills: 1 | Status: SHIPPED | OUTPATIENT
Start: 2023-08-29 | End: 2023-12-12 | Stop reason: SDUPTHER

## 2023-09-01 ENCOUNTER — TELEPHONE (OUTPATIENT)
Dept: FAMILY MEDICINE | Facility: CLINIC | Age: 53
End: 2023-09-01
Payer: COMMERCIAL

## 2023-09-01 NOTE — TELEPHONE ENCOUNTER
Spoke with patient's  and notified him that patients needs to make an appointment regarding complaint in order to have referral.

## 2023-09-01 NOTE — TELEPHONE ENCOUNTER
----- Message from Adriana William sent at 8/23/2023  2:35 PM CDT -----  Told nurse about spot on face and was wanting to be referred to someone to get it removed. It is the same spot she had before. [facility] or [facility] said she will come back if need to so you can look at it.

## 2023-10-02 ENCOUNTER — OFFICE VISIT (OUTPATIENT)
Dept: FAMILY MEDICINE | Facility: CLINIC | Age: 53
End: 2023-10-02
Payer: COMMERCIAL

## 2023-10-02 VITALS
TEMPERATURE: 98 F | RESPIRATION RATE: 18 BRPM | BODY MASS INDEX: 38.82 KG/M2 | HEIGHT: 65 IN | OXYGEN SATURATION: 97 % | WEIGHT: 233 LBS | HEART RATE: 80 BPM

## 2023-10-02 DIAGNOSIS — L98.9 LESION OF SKIN OF FACE: Primary | ICD-10-CM

## 2023-10-02 DIAGNOSIS — R07.9 CHEST PAIN, UNSPECIFIED TYPE: ICD-10-CM

## 2023-10-02 DIAGNOSIS — R06.2 WHEEZING: ICD-10-CM

## 2023-10-02 DIAGNOSIS — E78.5 HYPERLIPIDEMIA, UNSPECIFIED HYPERLIPIDEMIA TYPE: ICD-10-CM

## 2023-10-02 DIAGNOSIS — J31.0 CHRONIC RHINITIS: ICD-10-CM

## 2023-10-02 DIAGNOSIS — I10 ESSENTIAL HYPERTENSION: ICD-10-CM

## 2023-10-02 PROCEDURE — 3044F PR MOST RECENT HEMOGLOBIN A1C LEVEL <7.0%: ICD-10-PCS | Mod: CPTII,,,

## 2023-10-02 PROCEDURE — 99214 OFFICE O/P EST MOD 30 MIN: CPT | Mod: ,,,

## 2023-10-02 PROCEDURE — 3061F NEG MICROALBUMINURIA REV: CPT | Mod: CPTII,,,

## 2023-10-02 PROCEDURE — 3008F BODY MASS INDEX DOCD: CPT | Mod: CPTII,,,

## 2023-10-02 PROCEDURE — 3008F PR BODY MASS INDEX (BMI) DOCUMENTED: ICD-10-PCS | Mod: CPTII,,,

## 2023-10-02 PROCEDURE — 1160F PR REVIEW ALL MEDS BY PRESCRIBER/CLIN PHARMACIST DOCUMENTED: ICD-10-PCS | Mod: CPTII,,,

## 2023-10-02 PROCEDURE — 1160F RVW MEDS BY RX/DR IN RCRD: CPT | Mod: CPTII,,,

## 2023-10-02 PROCEDURE — 1159F MED LIST DOCD IN RCRD: CPT | Mod: CPTII,,,

## 2023-10-02 PROCEDURE — 1159F PR MEDICATION LIST DOCUMENTED IN MEDICAL RECORD: ICD-10-PCS | Mod: CPTII,,,

## 2023-10-02 PROCEDURE — 4010F ACE/ARB THERAPY RXD/TAKEN: CPT | Mod: CPTII,,,

## 2023-10-02 PROCEDURE — 3066F NEPHROPATHY DOC TX: CPT | Mod: CPTII,,,

## 2023-10-02 PROCEDURE — 3061F PR NEG MICROALBUMINURIA RESULT DOCUMENTED/REVIEW: ICD-10-PCS | Mod: CPTII,,,

## 2023-10-02 PROCEDURE — 3044F HG A1C LEVEL LT 7.0%: CPT | Mod: CPTII,,,

## 2023-10-02 PROCEDURE — 4010F PR ACE/ARB THEARPY RXD/TAKEN: ICD-10-PCS | Mod: CPTII,,,

## 2023-10-02 PROCEDURE — 3066F PR DOCUMENTATION OF TREATMENT FOR NEPHROPATHY: ICD-10-PCS | Mod: CPTII,,,

## 2023-10-02 PROCEDURE — 99214 PR OFFICE/OUTPT VISIT, EST, LEVL IV, 30-39 MIN: ICD-10-PCS | Mod: ,,,

## 2023-10-02 RX ORDER — ALBUTEROL SULFATE 90 UG/1
AEROSOL, METERED RESPIRATORY (INHALATION)
Qty: 18 G | Refills: 1 | Status: CANCELLED | OUTPATIENT
Start: 2023-10-02

## 2023-10-02 RX ORDER — ALBUTEROL SULFATE 0.83 MG/ML
2.5 SOLUTION RESPIRATORY (INHALATION) EVERY 6 HOURS PRN
Qty: 30 EACH | Refills: 1 | Status: CANCELLED | OUTPATIENT
Start: 2023-10-02 | End: 2024-10-01

## 2023-10-02 RX ORDER — NITROGLYCERIN 0.4 MG/1
0.4 TABLET SUBLINGUAL EVERY 5 MIN PRN
Qty: 25 TABLET | Refills: 1 | Status: SHIPPED | OUTPATIENT
Start: 2023-10-02

## 2023-10-02 RX ORDER — ALBUTEROL SULFATE 0.83 MG/ML
2.5 SOLUTION RESPIRATORY (INHALATION) EVERY 6 HOURS PRN
Qty: 120 EACH | Refills: 1 | Status: SHIPPED | OUTPATIENT
Start: 2023-10-02 | End: 2023-12-12 | Stop reason: SDUPTHER

## 2023-10-02 RX ORDER — BUDESONIDE AND FORMOTEROL FUMARATE DIHYDRATE 160; 4.5 UG/1; UG/1
2 AEROSOL RESPIRATORY (INHALATION) EVERY 12 HOURS
Qty: 10.2 G | Refills: 1 | Status: SHIPPED | OUTPATIENT
Start: 2023-10-02 | End: 2023-12-12 | Stop reason: SDUPTHER

## 2023-10-02 RX ORDER — FLUTICASONE PROPIONATE 50 MCG
1 SPRAY, SUSPENSION (ML) NASAL DAILY
Qty: 15 G | Refills: 2 | Status: SHIPPED | OUTPATIENT
Start: 2023-10-02

## 2023-10-02 RX ORDER — ALBUTEROL SULFATE 90 UG/1
AEROSOL, METERED RESPIRATORY (INHALATION)
Qty: 18 G | Refills: 1 | Status: SHIPPED | OUTPATIENT
Start: 2023-10-02 | End: 2023-12-12 | Stop reason: SDUPTHER

## 2023-10-02 NOTE — ASSESSMENT & PLAN NOTE
Pt recently visited the ED with Chest pain and realized she did not have a current rx for her NTG. Will refill and re instruct on use

## 2023-10-02 NOTE — PROGRESS NOTES
Subjective     Patient ID: Kirti Morris is a 53 y.o. female.    Chief Complaint: Referral (Requesting a referral to Dermatology.Two Skin tags on left cheek. Dryness and itching. Not tender to touch. /)      Pt here for refill medication and lab work today. Has a lesion to left cheek and has had for over 3 months and it has grown rapidly over the past 3 months. Pt had place burned off in that area years ago and they called it an oily wart.   Pt denies Chest pain or sob (no more than usual). She does get sob walking to the car.        Review of Systems   Constitutional:  Negative for activity change, appetite change and fatigue.   HENT:  Negative for trouble swallowing.    Eyes:  Negative for pain and visual disturbance.   Respiratory:  Negative for chest tightness and shortness of breath.    Cardiovascular:  Negative for chest pain and palpitations.   Gastrointestinal:  Negative for change in bowel habit.   Genitourinary:  Negative for difficulty urinating and dysuria.   Musculoskeletal:  Negative for arthralgias and myalgias.   Integumentary:  Positive for mole/lesion. Negative for rash and wound.        She has a lesion to left cheek on her face. Has been there since Jan and saw Located within [facility] for this issue and thought she was going to place a Derm ref, but never heard from this and would like to have this place seen about bc it has grown pretty quickly over the past 3 mos. Please see picture   Neurological:  Negative for weakness and headaches.   Psychiatric/Behavioral:  The patient is not nervous/anxious.               Objective     Physical Exam  Vitals and nursing note reviewed.   Constitutional:       Appearance: Normal appearance. She is not ill-appearing.   HENT:      Head: Normocephalic and atraumatic.      Right Ear: Tympanic membrane, ear canal and external ear normal.      Left Ear: Tympanic membrane, ear canal and external ear normal.      Nose: Nose normal.      Mouth/Throat:      Mouth: Mucous membranes  are moist.      Pharynx: Oropharynx is clear.   Eyes:      Extraocular Movements: Extraocular movements intact.      Conjunctiva/sclera: Conjunctivae normal.      Pupils: Pupils are equal, round, and reactive to light.   Cardiovascular:      Rate and Rhythm: Normal rate and regular rhythm.      Pulses: Normal pulses.      Heart sounds: Normal heart sounds.   Pulmonary:      Effort: Pulmonary effort is normal. No respiratory distress.      Breath sounds: Normal breath sounds.   Abdominal:      General: Bowel sounds are normal.      Tenderness: There is no abdominal tenderness.   Musculoskeletal:         General: Normal range of motion.      Cervical back: Normal range of motion and neck supple. No tenderness.   Skin:     General: Skin is warm and dry.   Neurological:      General: No focal deficit present.      Mental Status: She is alert and oriented to person, place, and time.   Psychiatric:         Mood and Affect: Mood normal.         Behavior: Behavior normal.         Thought Content: Thought content normal.         Judgment: Judgment normal.              Assessment and Plan     1. Lesion of skin of face  Assessment & Plan:  Amb ref to Derm would like to See dr. Hodge        Orders:  -     Ambulatory referral/consult to Dermatology; Future; Expected date: 10/09/2023    2. Wheezing  Assessment & Plan:  Will refill current medications as this has symptoms undercontrol    Orders:  -     budesonide-formoterol 160-4.5 mcg (SYMBICORT) 160-4.5 mcg/actuation HFAA; Inhale 2 puffs into the lungs every 12 (twelve) hours. Controller  Dispense: 10.2 g; Refill: 1  -     albuterol (PROVENTIL) 2.5 mg /3 mL (0.083 %) nebulizer solution; Take 3 mLs (2.5 mg total) by nebulization every 6 (six) hours as needed for Wheezing. Rescue  Dispense: 120 each; Refill: 1  -     albuterol (PROVENTIL/VENTOLIN HFA) 90 mcg/actuation inhaler; INHALE TWO PUFFS BY MOUTH TWICE DAILY FOR SHORTNESS OF BREATH / not more THAN FOUR TIMES DAILY  Dispense:  18 g; Refill: 1    3. Chest pain, unspecified type  Assessment & Plan:  Pt recently visited the ED with Chest pain and realized she did not have a current rx for her NTG. Will refill and re instruct on use    Orders:  -     nitroGLYCERIN (NITROSTAT) 0.4 MG SL tablet; Place 1 tablet (0.4 mg total) under the tongue every 5 (five) minutes as needed for Chest pain.  Dispense: 25 tablet; Refill: 1    4. Chronic rhinitis  Assessment & Plan:  Will restart using Flonase daily along with her zyrtec    Orders:  -     fluticasone propionate (FLONASE) 50 mcg/actuation nasal spray; 1 spray (50 mcg total) by Each Nostril route once daily.  Dispense: 15 g; Refill: 2    5. Essential hypertension  Assessment & Plan:  Blood pressure is controlled. Current medical regimen is effective;   Will adjust according to results and monitor.    Orders:  -     CBC Auto Differential; Future; Expected date: 10/02/2023  -     Comprehensive Metabolic Panel; Future; Expected date: 10/02/2023    6. Hyperlipidemia, unspecified hyperlipidemia type  Assessment & Plan:  Hyperlipidemia is controlled. Current medical regimen is effective;   Will adjust according to results and monitor.    Orders:  -     Lipid Panel; Future; Expected date: 10/02/2023               Follow up in about 6 months (around 4/2/2024).

## 2023-10-04 ENCOUNTER — LAB VISIT (OUTPATIENT)
Dept: LAB | Facility: CLINIC | Age: 53
End: 2023-10-04
Payer: COMMERCIAL

## 2023-10-04 DIAGNOSIS — E78.5 HYPERLIPIDEMIA, UNSPECIFIED HYPERLIPIDEMIA TYPE: ICD-10-CM

## 2023-10-04 DIAGNOSIS — I10 ESSENTIAL HYPERTENSION: ICD-10-CM

## 2023-10-04 LAB
ALBUMIN SERPL BCP-MCNC: 3.3 G/DL (ref 3.5–5)
ALBUMIN/GLOB SERPL: 1 {RATIO}
ALP SERPL-CCNC: 88 U/L (ref 41–108)
ALT SERPL W P-5'-P-CCNC: 24 U/L (ref 13–56)
ANION GAP SERPL CALCULATED.3IONS-SCNC: 8 MMOL/L (ref 7–16)
AST SERPL W P-5'-P-CCNC: 15 U/L (ref 15–37)
BASOPHILS # BLD AUTO: 0.07 K/UL (ref 0–0.2)
BASOPHILS NFR BLD AUTO: 0.9 % (ref 0–1)
BILIRUB SERPL-MCNC: 0.4 MG/DL (ref ?–1.2)
BUN SERPL-MCNC: 17 MG/DL (ref 7–18)
BUN/CREAT SERPL: 8 (ref 6–20)
CALCIUM SERPL-MCNC: 8.6 MG/DL (ref 8.5–10.1)
CHLORIDE SERPL-SCNC: 111 MMOL/L (ref 98–107)
CHOLEST SERPL-MCNC: 129 MG/DL (ref 0–200)
CHOLEST/HDLC SERPL: 2.4 {RATIO}
CO2 SERPL-SCNC: 25 MMOL/L (ref 21–32)
CREAT SERPL-MCNC: 2.01 MG/DL (ref 0.55–1.02)
DIFFERENTIAL METHOD BLD: ABNORMAL
EGFR (NO RACE VARIABLE) (RUSH/TITUS): 29 ML/MIN/1.73M2
EOSINOPHIL # BLD AUTO: 0.22 K/UL (ref 0–0.5)
EOSINOPHIL NFR BLD AUTO: 2.8 % (ref 1–4)
ERYTHROCYTE [DISTWIDTH] IN BLOOD BY AUTOMATED COUNT: 13 % (ref 11.5–14.5)
GLOBULIN SER-MCNC: 3.3 G/DL (ref 2–4)
GLUCOSE SERPL-MCNC: 108 MG/DL (ref 74–106)
HCT VFR BLD AUTO: 40.7 % (ref 38–47)
HDLC SERPL-MCNC: 53 MG/DL (ref 40–60)
HGB BLD-MCNC: 13.5 G/DL (ref 12–16)
IMM GRANULOCYTES # BLD AUTO: 0.02 K/UL (ref 0–0.04)
IMM GRANULOCYTES NFR BLD: 0.3 % (ref 0–0.4)
LDLC SERPL CALC-MCNC: 30 MG/DL
LYMPHOCYTES # BLD AUTO: 2.69 K/UL (ref 1–4.8)
LYMPHOCYTES NFR BLD AUTO: 33.8 % (ref 27–41)
MCH RBC QN AUTO: 31.9 PG (ref 27–31)
MCHC RBC AUTO-ENTMCNC: 33.2 G/DL (ref 32–36)
MCV RBC AUTO: 96.2 FL (ref 80–96)
MONOCYTES # BLD AUTO: 0.58 K/UL (ref 0–0.8)
MONOCYTES NFR BLD AUTO: 7.3 % (ref 2–6)
MPC BLD CALC-MCNC: 11.4 FL (ref 9.4–12.4)
NEUTROPHILS # BLD AUTO: 4.37 K/UL (ref 1.8–7.7)
NEUTROPHILS NFR BLD AUTO: 54.9 % (ref 53–65)
NONHDLC SERPL-MCNC: 76 MG/DL
NRBC # BLD AUTO: 0 X10E3/UL
NRBC, AUTO (.00): 0 %
PLATELET # BLD AUTO: 267 K/UL (ref 150–400)
POTASSIUM SERPL-SCNC: 4.2 MMOL/L (ref 3.5–5.1)
PROT SERPL-MCNC: 6.6 G/DL (ref 6.4–8.2)
RBC # BLD AUTO: 4.23 M/UL (ref 4.2–5.4)
SODIUM SERPL-SCNC: 140 MMOL/L (ref 136–145)
TRIGL SERPL-MCNC: 228 MG/DL (ref 35–150)
VLDLC SERPL-MCNC: 46 MG/DL
WBC # BLD AUTO: 7.95 K/UL (ref 4.5–11)

## 2023-10-04 PROCEDURE — 85025 CBC WITH DIFFERENTIAL: ICD-10-PCS | Mod: ,,, | Performed by: CLINICAL MEDICAL LABORATORY

## 2023-10-04 PROCEDURE — 80053 COMPREHENSIVE METABOLIC PANEL: ICD-10-PCS | Mod: ,,, | Performed by: CLINICAL MEDICAL LABORATORY

## 2023-10-04 PROCEDURE — 85025 COMPLETE CBC W/AUTO DIFF WBC: CPT | Mod: ,,, | Performed by: CLINICAL MEDICAL LABORATORY

## 2023-10-04 PROCEDURE — 80061 LIPID PANEL: CPT | Mod: ,,, | Performed by: CLINICAL MEDICAL LABORATORY

## 2023-10-04 PROCEDURE — 80061 LIPID PANEL: ICD-10-PCS | Mod: ,,, | Performed by: CLINICAL MEDICAL LABORATORY

## 2023-10-04 PROCEDURE — 36415 COLL VENOUS BLD VENIPUNCTURE: CPT

## 2023-10-04 PROCEDURE — 80053 COMPREHEN METABOLIC PANEL: CPT | Mod: ,,, | Performed by: CLINICAL MEDICAL LABORATORY

## 2023-10-05 NOTE — PROGRESS NOTES
Lab looks ok  Glucose 108  Triglycerides are elevated  Low fat, low cholesterol, low carb diet. Drink 8 oz of water every hour while awake.Decrease sugar intake  Start exercising daily 20-30 mins x 5 days a week.

## 2023-11-10 ENCOUNTER — OFFICE VISIT (OUTPATIENT)
Dept: DERMATOLOGY | Facility: CLINIC | Age: 53
End: 2023-11-10
Payer: COMMERCIAL

## 2023-11-10 DIAGNOSIS — L98.9 LESION OF SKIN OF FACE: ICD-10-CM

## 2023-11-10 DIAGNOSIS — D22.9 MULTIPLE BENIGN NEVI: ICD-10-CM

## 2023-11-10 DIAGNOSIS — L82.1 SEBORRHEIC KERATOSES: ICD-10-CM

## 2023-11-10 DIAGNOSIS — L82.0 INFLAMED SEBORRHEIC KERATOSIS: ICD-10-CM

## 2023-11-10 DIAGNOSIS — B07.9 VERRUCA VULGARIS: Primary | ICD-10-CM

## 2023-11-10 DIAGNOSIS — L81.8 IDIOPATHIC GUTTATE HYPOMELANOSIS: ICD-10-CM

## 2023-11-10 DIAGNOSIS — B37.2 CANDIDAL INTERTRIGO: ICD-10-CM

## 2023-11-10 PROCEDURE — 4010F PR ACE/ARB THEARPY RXD/TAKEN: ICD-10-PCS | Mod: CPTII,,, | Performed by: STUDENT IN AN ORGANIZED HEALTH CARE EDUCATION/TRAINING PROGRAM

## 2023-11-10 PROCEDURE — 3066F NEPHROPATHY DOC TX: CPT | Mod: CPTII,,, | Performed by: STUDENT IN AN ORGANIZED HEALTH CARE EDUCATION/TRAINING PROGRAM

## 2023-11-10 PROCEDURE — 1159F MED LIST DOCD IN RCRD: CPT | Mod: CPTII,,, | Performed by: STUDENT IN AN ORGANIZED HEALTH CARE EDUCATION/TRAINING PROGRAM

## 2023-11-10 PROCEDURE — 1159F PR MEDICATION LIST DOCUMENTED IN MEDICAL RECORD: ICD-10-PCS | Mod: CPTII,,, | Performed by: STUDENT IN AN ORGANIZED HEALTH CARE EDUCATION/TRAINING PROGRAM

## 2023-11-10 PROCEDURE — 99204 PR OFFICE/OUTPT VISIT, NEW, LEVL IV, 45-59 MIN: ICD-10-PCS | Mod: 25,,, | Performed by: STUDENT IN AN ORGANIZED HEALTH CARE EDUCATION/TRAINING PROGRAM

## 2023-11-10 PROCEDURE — 3066F PR DOCUMENTATION OF TREATMENT FOR NEPHROPATHY: ICD-10-PCS | Mod: CPTII,,, | Performed by: STUDENT IN AN ORGANIZED HEALTH CARE EDUCATION/TRAINING PROGRAM

## 2023-11-10 PROCEDURE — 3044F HG A1C LEVEL LT 7.0%: CPT | Mod: CPTII,,, | Performed by: STUDENT IN AN ORGANIZED HEALTH CARE EDUCATION/TRAINING PROGRAM

## 2023-11-10 PROCEDURE — 99204 OFFICE O/P NEW MOD 45 MIN: CPT | Mod: 25,,, | Performed by: STUDENT IN AN ORGANIZED HEALTH CARE EDUCATION/TRAINING PROGRAM

## 2023-11-10 PROCEDURE — 17110 DESTRUCTION B9 LES UP TO 14: CPT | Mod: ,,, | Performed by: STUDENT IN AN ORGANIZED HEALTH CARE EDUCATION/TRAINING PROGRAM

## 2023-11-10 PROCEDURE — 17110 PR DESTRUCTION BENIGN LESIONS UP TO 14: ICD-10-PCS | Mod: ,,, | Performed by: STUDENT IN AN ORGANIZED HEALTH CARE EDUCATION/TRAINING PROGRAM

## 2023-11-10 PROCEDURE — 3061F PR NEG MICROALBUMINURIA RESULT DOCUMENTED/REVIEW: ICD-10-PCS | Mod: CPTII,,, | Performed by: STUDENT IN AN ORGANIZED HEALTH CARE EDUCATION/TRAINING PROGRAM

## 2023-11-10 PROCEDURE — 3061F NEG MICROALBUMINURIA REV: CPT | Mod: CPTII,,, | Performed by: STUDENT IN AN ORGANIZED HEALTH CARE EDUCATION/TRAINING PROGRAM

## 2023-11-10 PROCEDURE — 4010F ACE/ARB THERAPY RXD/TAKEN: CPT | Mod: CPTII,,, | Performed by: STUDENT IN AN ORGANIZED HEALTH CARE EDUCATION/TRAINING PROGRAM

## 2023-11-10 PROCEDURE — 3044F PR MOST RECENT HEMOGLOBIN A1C LEVEL <7.0%: ICD-10-PCS | Mod: CPTII,,, | Performed by: STUDENT IN AN ORGANIZED HEALTH CARE EDUCATION/TRAINING PROGRAM

## 2023-11-10 RX ORDER — NYSTATIN 100000 [USP'U]/G
POWDER TOPICAL 2 TIMES DAILY
Qty: 60 G | Refills: 11 | Status: SHIPPED | OUTPATIENT
Start: 2023-11-10 | End: 2024-02-26

## 2023-11-10 RX ORDER — PANTOPRAZOLE SODIUM 40 MG/1
40 TABLET, DELAYED RELEASE ORAL 2 TIMES DAILY
Qty: 240 TABLET | Refills: 0 | Status: SHIPPED | OUTPATIENT
Start: 2023-11-10 | End: 2023-12-12 | Stop reason: SDUPTHER

## 2023-11-10 NOTE — TELEPHONE ENCOUNTER
----- Message from Mali Li sent at 11/10/2023 12:32 PM CST -----  This patient called and said she need her pantoprazole (PROTONIX) 40 MG tablet refilled and [facility]

## 2023-11-10 NOTE — PROGRESS NOTES
[facility]  Romero Hodge MD    [address] [address]  [phone]    Fax: [phone]    Patient Name: Kirti Morris  Medical Record Number: [id number]  PCP: Ana Pollock NP  Age: 53 y.o. : 1970  Contact: [phone] (home)     CC: lesion on left cheek  History of Present Illness:     Kirti Morris is a 53 y.o.  female with no history of skin cancer who presents to clinic today for lesion on left cheek. It has been present 7 months. Symptoms include growth and irritation.     The patient has no other concerns today.    Review of Systems:     Unremarkable other than mentioned above.     Physical Exam:     General: Relaxed, oriented, alert    Skin examination of the scalp, face, neck, chest, back, abdomen, upper extremities and lower extremities were normal except for as listed below      Assessment and Plan:     1. Verruca Vulgaris  - verrucous papules with thrombosed capillary loops located on the left cheek and right arm    Plan:    Liquid Nitrogen  A total of 2 lesion(s) was treated with liquid nitrogen, located on the above listed location.  This procedure was medically necessary because the lesions that were treated were: enlarging, inflamed, and contagious. The patient's consent was obtained including but not limited to risks of crusting, scabbing, blistering, scarring, darker or lighter pigmentary change, recurrence, incomplete removal and infection.    Counseling  Viral nature was discussed, and the risk of the warts spreading   Verruca Vulgaris can be treated with retinoids, aldara, salicylic acid preparations, cryotherapy, candida antigen, or cantharidin  HPV vaccination is recommended   Over the counter Wart Stick can be applied daily as an adjunct therapy (wait until blister heals from cryotherapy)       2. Irritated Seborrheic Keratoses (L82.0)  Stuck-on inflamed papules with crust located on the left cheek  Associated diagnoses: Pruritus  and Cutaneous Inflammation    Plan: Liquid Nitrogen.  A total of 1 lesions were treated with liquid nitrogen, located on the above listed location.  This procedure was medically necessary because the lesions that were treated were: irritated and itchy. The  patient's consent was obtained including but not limited to risks of crusting, scabbing, blistering, scarring, darker  or lighter pigmentary change, recurrence, incomplete removal and infection.      3.  Candidal intertrigo   - erythema and maceration with satellite papules   - nystatin powder bid PRN   - recommend keeping the area cool and dry   - fluconazole orally can be used. If the yeast is resistant to fluconazole, may need to try itraconazole    4. Benign Nevi  - Dome shaped regular papule    Plan: Reassurance.    Counseling.  Monthly self-skin checks to monitor for any changes in moles are recommended.  Contact Office if: Any moles change in size, shape or color; itch, burn or bleed.  Discussed use of sunscreen SPF 30 or great     5. Seborrheic keratoses   - brown stuck on appearing papules/plaques  - patient educated on benign nature. No treatment necessary unless they become irritated or inflamed     6. Idiopathic guttate hypomelanosis  - hypopigmented macules    - discussed this benign lost of pigmentation in the skin.    Return to clinic in 8 weeks.    AVS printed with patient instructions     Romero Hodge MD   Mohs Surgery/Dermatologic Oncology  Dermatology

## 2023-11-22 ENCOUNTER — PATIENT OUTREACH (OUTPATIENT)
Dept: ADMINISTRATIVE | Facility: HOSPITAL | Age: 53
End: 2023-11-22

## 2023-11-22 NOTE — PROGRESS NOTES
Population Health Chart Review & Patient Outreach Details    Per [employer] website, insurance is active and pt is listed on the attributed list needing a healthy you performed in   .Pt needs appt for hy,  sent to P[facility] to schedule via one note        Updates Requested / Reviewed:     []  Care Everywhere    []     []  External Sources ([facility], [facility], [facility], etc.)    [] [facility]   [] [facility]   [] Other:    []  Care Team Updated   []  Removed  or Duplicate Orders   []  Immunization Reconciliation Completed / Queried    [] [place]   [] [place]   [] [place]   [] [place]      Health Maintenance Topics Addressed and Outreach Outcomes / Actions Taken:             Breast Cancer Screening []  Mammogram Order Placed    []  Mammogram Screening Scheduled    []  External Records Requested & Care Team Updated if Applicable    []  External Records Uploaded & Care Team Updated if Applicable    []  Pt Declined Scheduling Mammogram    []  Pt Will Schedule with External Provider / Order Routed & Care Team Updated if Applicable              Cervical Cancer Screening []  Pap Smear Scheduled in Primary Care or OBGYN    []  External Records Requested & Care Team Updated if Applicable       []  External Records Uploaded, Care Team Updated, & History Updated if Applicable    []  Patient Declined Scheduling Pap Smear    []  Patient Will Schedule with External Provider & Care Team Updated if Applicable                  Colorectal Cancer Screening []  Colonoscopy Case Request / Referral / Home Test Order Placed    []  External Records Requested & Care Team Updated if Applicable    []  External Records Uploaded, Care Team Updated, & History Updated if Applicable    []  Patient Declined Completing Colon Cancer Screening    []  Patient Will Schedule with External Provider & Care Team Updated if Applicable    []  Fit Kit Mailed (add the SmartPhrase under additional notes)    []  Reminded Patient to Complete Home Test                 Diabetic Eye Exam []  Eye Exam Screening Order Placed    []  Eye Camera Scheduled or Optometry/Ophthalmology Referral Placed    []  External Records Requested & Care Team Updated if Applicable    []  External Records Uploaded, Care Team Updated, & History Updated if Applicable    []  Patient Declined Scheduling Eye Exam    []  Patient Will Schedule with External Provider & Care Team Updated if Applicable             Blood Pressure Control []  Primary Care Follow Up Visit Scheduled     []  Remote Blood Pressure Reading Captured    []  Patient Declined Remote Reading or Scheduling Appt - Escalated to PCP    []  Patient Will Call Back or Send Portal Message with Reading                 HbA1c & Other Labs []  Overdue Lab(s) Ordered    []  Overdue Lab(s) Scheduled    []  External Records Uploaded & Care Team Updated if Applicable    []  Primary Care Follow Up Visit Scheduled     []  Reminded Patient to Complete A1c Home Test    []  Patient Declined Scheduling Labs or Will Call Back to Schedule    []  Patient Will Schedule with External Provider / Order Routed, & Care Team Updated if Applicable           Primary Care Appointment []  Primary Care Appt Scheduled    []  Patient Declined Scheduling or Will Call Back to Schedule    []  Pt Established with External Provider, Updated Care Team, & Informed Pt to Notify Payor if Applicable           Medication Adherence /    Statin Use []  Primary Care Appointment Scheduled    []  Patient Reminded to  Prescription    []  Patient Declined, Provider Notified if Needed    []  Sent Provider Message to Review to Evaluate Pt for Statin, Add Exclusion Dx Codes, Document   Exclusion in Problem List, Change Statin Intensity Level to Moderate or High Intensity if Applicable                Osteoporosis Screening []  Dexa Order Placed    []  Dexa Appointment Scheduled    []  External Records Requested & Care Team Updated    []  External Records Uploaded, Care Team Updated, & History  Updated if Applicable    []  Patient Declined Scheduling Dexa or Will Call Back to Schedule    []  Patient Will Schedule with External Provider / Order Routed & Care Team Updated if Applicable       Additional Notes:

## 2023-12-12 ENCOUNTER — TELEPHONE (OUTPATIENT)
Dept: FAMILY MEDICINE | Facility: CLINIC | Age: 53
End: 2023-12-12
Payer: COMMERCIAL

## 2023-12-12 ENCOUNTER — OFFICE VISIT (OUTPATIENT)
Dept: FAMILY MEDICINE | Facility: CLINIC | Age: 53
End: 2023-12-12
Payer: COMMERCIAL

## 2023-12-12 VITALS
SYSTOLIC BLOOD PRESSURE: 123 MMHG | HEIGHT: 65 IN | DIASTOLIC BLOOD PRESSURE: 73 MMHG | OXYGEN SATURATION: 99 % | WEIGHT: 227 LBS | TEMPERATURE: 98 F | RESPIRATION RATE: 18 BRPM | HEART RATE: 69 BPM | BODY MASS INDEX: 37.82 KG/M2

## 2023-12-12 DIAGNOSIS — M54.50 CHRONIC LEFT-SIDED LOW BACK PAIN WITHOUT SCIATICA: ICD-10-CM

## 2023-12-12 DIAGNOSIS — M62.838 MUSCLE SPASM: ICD-10-CM

## 2023-12-12 DIAGNOSIS — I10 ESSENTIAL HYPERTENSION: ICD-10-CM

## 2023-12-12 DIAGNOSIS — J30.2 SEASONAL ALLERGIES: ICD-10-CM

## 2023-12-12 DIAGNOSIS — G89.29 CHRONIC LEFT-SIDED LOW BACK PAIN WITHOUT SCIATICA: ICD-10-CM

## 2023-12-12 DIAGNOSIS — R06.2 WHEEZING: ICD-10-CM

## 2023-12-12 DIAGNOSIS — Z12.39 BREAST SCREENING: ICD-10-CM

## 2023-12-12 DIAGNOSIS — J44.9 CHRONIC OBSTRUCTIVE PULMONARY DISEASE, UNSPECIFIED COPD TYPE: ICD-10-CM

## 2023-12-12 DIAGNOSIS — R53.83 FATIGUE, UNSPECIFIED TYPE: ICD-10-CM

## 2023-12-12 DIAGNOSIS — R07.9 CHEST PAIN, UNSPECIFIED TYPE: Primary | ICD-10-CM

## 2023-12-12 LAB
25(OH)D3 SERPL-MCNC: 68.3 NG/ML
EKG 12-LEAD: ABNORMAL
FOLATE SERPL-MCNC: 15.9 NG/ML (ref 3.1–17.5)
PR INTERVAL: ABNORMAL
PRT AXES: ABNORMAL
QRS DURATION: ABNORMAL
QT/QTC: ABNORMAL
VENTRICULAR RATE: ABNORMAL
VIT B12 SERPL-MCNC: 288 PG/ML (ref 193–986)

## 2023-12-12 PROCEDURE — 82746 ASSAY OF FOLIC ACID SERUM: CPT | Mod: ,,, | Performed by: CLINICAL MEDICAL LABORATORY

## 2023-12-12 PROCEDURE — 1160F PR REVIEW ALL MEDS BY PRESCRIBER/CLIN PHARMACIST DOCUMENTED: ICD-10-PCS | Mod: CPTII,,,

## 2023-12-12 PROCEDURE — 82607 VITAMIN B12/FOLATE, SERUM PANEL: ICD-10-PCS | Mod: ,,, | Performed by: CLINICAL MEDICAL LABORATORY

## 2023-12-12 PROCEDURE — 82306 VITAMIN D 25 HYDROXY: CPT | Mod: ,,, | Performed by: CLINICAL MEDICAL LABORATORY

## 2023-12-12 PROCEDURE — 3078F DIAST BP <80 MM HG: CPT | Mod: CPTII,,,

## 2023-12-12 PROCEDURE — 93000 POCT EKG 12-LEAD: ICD-10-PCS | Mod: ,,,

## 2023-12-12 PROCEDURE — 1159F MED LIST DOCD IN RCRD: CPT | Mod: CPTII,,,

## 2023-12-12 PROCEDURE — 93000 ELECTROCARDIOGRAM COMPLETE: CPT | Mod: ,,,

## 2023-12-12 PROCEDURE — 3061F NEG MICROALBUMINURIA REV: CPT | Mod: CPTII,,,

## 2023-12-12 PROCEDURE — 82306 VITAMIN D: ICD-10-PCS | Mod: ,,, | Performed by: CLINICAL MEDICAL LABORATORY

## 2023-12-12 PROCEDURE — 3074F SYST BP LT 130 MM HG: CPT | Mod: CPTII,,,

## 2023-12-12 PROCEDURE — 82746 VITAMIN B12/FOLATE, SERUM PANEL: ICD-10-PCS | Mod: ,,, | Performed by: CLINICAL MEDICAL LABORATORY

## 2023-12-12 PROCEDURE — 4010F PR ACE/ARB THEARPY RXD/TAKEN: ICD-10-PCS | Mod: CPTII,,,

## 2023-12-12 PROCEDURE — 1159F PR MEDICATION LIST DOCUMENTED IN MEDICAL RECORD: ICD-10-PCS | Mod: CPTII,,,

## 2023-12-12 PROCEDURE — 3066F NEPHROPATHY DOC TX: CPT | Mod: CPTII,,,

## 2023-12-12 PROCEDURE — 99214 PR OFFICE/OUTPT VISIT, EST, LEVL IV, 30-39 MIN: ICD-10-PCS | Mod: 25,,,

## 2023-12-12 PROCEDURE — 3044F HG A1C LEVEL LT 7.0%: CPT | Mod: CPTII,,,

## 2023-12-12 PROCEDURE — 3078F PR MOST RECENT DIASTOLIC BLOOD PRESSURE < 80 MM HG: ICD-10-PCS | Mod: CPTII,,,

## 2023-12-12 PROCEDURE — 3008F PR BODY MASS INDEX (BMI) DOCUMENTED: ICD-10-PCS | Mod: CPTII,,,

## 2023-12-12 PROCEDURE — 82607 VITAMIN B-12: CPT | Mod: ,,, | Performed by: CLINICAL MEDICAL LABORATORY

## 2023-12-12 PROCEDURE — 3066F PR DOCUMENTATION OF TREATMENT FOR NEPHROPATHY: ICD-10-PCS | Mod: CPTII,,,

## 2023-12-12 PROCEDURE — 99214 OFFICE O/P EST MOD 30 MIN: CPT | Mod: 25,,,

## 2023-12-12 PROCEDURE — 3044F PR MOST RECENT HEMOGLOBIN A1C LEVEL <7.0%: ICD-10-PCS | Mod: CPTII,,,

## 2023-12-12 PROCEDURE — 3061F PR NEG MICROALBUMINURIA RESULT DOCUMENTED/REVIEW: ICD-10-PCS | Mod: CPTII,,,

## 2023-12-12 PROCEDURE — 4010F ACE/ARB THERAPY RXD/TAKEN: CPT | Mod: CPTII,,,

## 2023-12-12 PROCEDURE — 3074F PR MOST RECENT SYSTOLIC BLOOD PRESSURE < 130 MM HG: ICD-10-PCS | Mod: CPTII,,,

## 2023-12-12 PROCEDURE — 3008F BODY MASS INDEX DOCD: CPT | Mod: CPTII,,,

## 2023-12-12 PROCEDURE — 1160F RVW MEDS BY RX/DR IN RCRD: CPT | Mod: CPTII,,,

## 2023-12-12 RX ORDER — BUDESONIDE AND FORMOTEROL FUMARATE DIHYDRATE 160; 4.5 UG/1; UG/1
2 AEROSOL RESPIRATORY (INHALATION) EVERY 12 HOURS
Qty: 10.2 G | Refills: 5 | Status: SHIPPED | OUTPATIENT
Start: 2023-12-12

## 2023-12-12 RX ORDER — AMLODIPINE BESYLATE 10 MG/1
10 TABLET ORAL DAILY
Qty: 30 TABLET | Refills: 5 | Status: SHIPPED | OUTPATIENT
Start: 2023-12-12

## 2023-12-12 RX ORDER — FAMOTIDINE 20 MG/1
20 TABLET, FILM COATED ORAL 2 TIMES DAILY
Qty: 60 TABLET | Refills: 5 | Status: SHIPPED | OUTPATIENT
Start: 2023-12-12

## 2023-12-12 RX ORDER — ALBUTEROL SULFATE 90 UG/1
2 AEROSOL, METERED RESPIRATORY (INHALATION) EVERY 6 HOURS PRN
Qty: 18 G | Refills: 11 | Status: SHIPPED | OUTPATIENT
Start: 2023-12-12

## 2023-12-12 RX ORDER — LABETALOL 200 MG/1
200 TABLET, FILM COATED ORAL 2 TIMES DAILY
Qty: 60 TABLET | Refills: 5 | Status: SHIPPED | OUTPATIENT
Start: 2023-12-12 | End: 2024-01-10

## 2023-12-12 RX ORDER — DULOXETIN HYDROCHLORIDE 60 MG/1
60 CAPSULE, DELAYED RELEASE ORAL DAILY
Qty: 30 CAPSULE | Refills: 5 | Status: SHIPPED | OUTPATIENT
Start: 2023-12-12 | End: 2024-12-11

## 2023-12-12 RX ORDER — PANTOPRAZOLE SODIUM 40 MG/1
40 TABLET, DELAYED RELEASE ORAL 2 TIMES DAILY
Qty: 60 TABLET | Refills: 5 | Status: SHIPPED | OUTPATIENT
Start: 2023-12-12 | End: 2024-01-10

## 2023-12-12 RX ORDER — HYDROXYZINE PAMOATE 25 MG/1
25 CAPSULE ORAL EVERY 8 HOURS PRN
Qty: 30 CAPSULE | Refills: 5 | Status: SHIPPED | OUTPATIENT
Start: 2023-12-12

## 2023-12-12 RX ORDER — GABAPENTIN 600 MG/1
600 TABLET ORAL 3 TIMES DAILY
Qty: 90 TABLET | Refills: 5 | Status: SHIPPED | OUTPATIENT
Start: 2023-12-12

## 2023-12-12 RX ORDER — LOSARTAN POTASSIUM 50 MG/1
50 TABLET ORAL DAILY
Qty: 30 TABLET | Refills: 5 | Status: SHIPPED | OUTPATIENT
Start: 2023-12-12

## 2023-12-12 RX ORDER — CETIRIZINE HYDROCHLORIDE 10 MG/1
10 TABLET ORAL DAILY
Qty: 30 TABLET | Refills: 5 | Status: SHIPPED | OUTPATIENT
Start: 2023-12-12

## 2023-12-12 RX ORDER — ALBUTEROL SULFATE 0.83 MG/ML
2.5 SOLUTION RESPIRATORY (INHALATION) EVERY 6 HOURS PRN
Qty: 120 EACH | Refills: 1 | Status: SHIPPED | OUTPATIENT
Start: 2023-12-12 | End: 2024-12-11

## 2023-12-12 RX ORDER — CLOPIDOGREL BISULFATE 75 MG/1
75 TABLET ORAL DAILY
Qty: 30 TABLET | Refills: 5 | Status: SHIPPED | OUTPATIENT
Start: 2023-12-12

## 2023-12-12 RX ORDER — TIZANIDINE 4 MG/1
4 TABLET ORAL EVERY 6 HOURS PRN
Qty: 30 TABLET | Refills: 1 | Status: SHIPPED | OUTPATIENT
Start: 2023-12-12

## 2023-12-12 NOTE — PROGRESS NOTES
Subjective     Patient ID: Kirti Morris is a 53 y.o. female.    Chief Complaint: Medication Refill (Patient here today for medication refills. ), Health Maintenance (Hepatitis C Screening Never done-declined/COVID-19 Vaccine(1) Never done-declined/Pneumococcal Vaccines (Age 0-64)(1 - PCV) Never done-will get at pharmacy/HIV Screening Never done-declined/TETANUS VACCINE Never done-declined/High Dose Statin Never done-declined/Shingles Vaccine(1 of 2) Never done-declined/Mammogram due on 07/23/2022-will order today-[place]/Influenza Vaccine(1) due on 09/01/2023-[facility] year [facility]/), and Chest Pain (Left sided chest pain. Intermitently. For a long time now.  She has seen Dr. Conte and gotten a clear report from him. Has seen GI(Dr. Green) and they have told her that she has a hiatal hernia.He thinks she isnt emptying her stomach completely. )      Pt is here for med refills and labs. She is continued having weakness and malaise and chest pain. She describes the CP as jolts to chest very deep. She is a smoker, she sees Dr. Conte on April 4 and GI doctor on Monday. Will get EKG today. EKG shows Sinus Ralph at 47 bpm She is taking labetalol 200 mg po BID. She is going hold this medicine until she can get back into see Dr. Conte. Will call her with the appt. Appt is Monday 12/18    Medication Refill  This is a chronic problem. The current episode started more than 1 year ago. The problem occurs constantly. The problem has been unchanged. Associated symptoms include chest pain. Pertinent negatives include no arthralgias, change in bowel habit, fatigue, fever, headaches, myalgias, rash or weakness. Exacerbated by: taking meds as prescribed. Treatments tried: Pt is taking meds as prescribed. The treatment provided significant relief.   Chest Pain   This is a recurrent problem. The current episode started more than 1 month ago. The onset quality is gradual. The problem occurs daily. The problem has been unchanged. Pain  location: Pain moves from right side to left side to center of chest. The pain is at a severity of 6/10. The pain is moderate. Quality: deep jolts, pt has appt with Dr. Conte and has pushed appts out to one year. Has seen GI and will return to see him on MOnday at 1030. The pain does not radiate. Pertinent negatives include no exertional chest pressure, fever, headaches, irregular heartbeat, palpitations, shortness of breath or weakness. The pain is aggravated by nothing. Treatments tried: Pt is seeing cardiology. GI doctor thinks this is more GI than cardiology. Risk factors include lack of exercise, sedentary lifestyle and smoking/tobacco exposure. Prior workup: EKG today.       Review of Systems   Constitutional:  Negative for activity change, appetite change, fatigue and fever.   HENT:  Negative for trouble swallowing.    Eyes:  Negative for pain and visual disturbance.   Respiratory:  Negative for chest tightness and shortness of breath.    Cardiovascular:  Positive for chest pain. Negative for palpitations.   Gastrointestinal:  Negative for change in bowel habit.   Genitourinary:  Negative for difficulty urinating and dysuria.   Musculoskeletal:  Negative for arthralgias and myalgias.   Integumentary:  Negative for rash and wound.   Neurological:  Negative for weakness and headaches.   Psychiatric/Behavioral:  The patient is not nervous/anxious.             Objective     Physical Exam  Vitals and nursing note reviewed.   Constitutional:       Appearance: Normal appearance. She is not ill-appearing.   HENT:      Head: Normocephalic and atraumatic.      Right Ear: Tympanic membrane, ear canal and external ear normal.      Left Ear: Tympanic membrane, ear canal and external ear normal.      Nose: Nose normal.      Mouth/Throat:      Mouth: Mucous membranes are moist.      Pharynx: Oropharynx is clear.   Eyes:      Extraocular Movements: Extraocular movements intact.      Conjunctiva/sclera: Conjunctivae normal.       Pupils: Pupils are equal, round, and reactive to light.   Cardiovascular:      Rate and Rhythm: Regular rhythm. Bradycardia present.      Pulses: Normal pulses.      Heart sounds: Normal heart sounds.      Comments: EKG show bradycardia, pt is very tired and falls asleep if she just sits in her chair. Will hold labetalol until she can see Dr. Conte.   Pulmonary:      Effort: Pulmonary effort is normal. No respiratory distress.      Breath sounds: Normal breath sounds.   Abdominal:      General: Bowel sounds are normal.      Tenderness: There is no abdominal tenderness.   Musculoskeletal:         General: Normal range of motion.      Cervical back: Normal range of motion and neck supple. No tenderness.   Skin:     General: Skin is warm and dry.      Capillary Refill: Capillary refill takes less than 2 seconds.   Neurological:      General: No focal deficit present.      Mental Status: She is alert and oriented to person, place, and time.   Psychiatric:         Mood and Affect: Mood normal.         Behavior: Behavior normal.         Thought Content: Thought content normal.         Judgment: Judgment normal.              Assessment and Plan     1. Chest pain, unspecified type  Assessment & Plan:  EKG Bradycardia. Pt will see her cardiologist on MONday    Orders:  -     POCT EKG 12-LEAD (Manually Resulted by Ordering Provider)    2. Essential hypertension  Assessment & Plan:  Blood pressure is controlled. Current medical regimen is effective;   Will adjust according to results and monitor.     Orders:  -     labetaloL (NORMODYNE) 200 MG tablet; Take 1 tablet (200 mg total) by mouth 2 (two) times daily.  Dispense: 60 tablet; Refill: 5    3. Muscle spasm  Assessment & Plan:  Will continue current medication as prescribed. Symptoms under control     Orders:  -     tiZANidine (ZANAFLEX) 4 MG tablet; Take 1 tablet (4 mg total) by mouth every 6 (six) hours as needed (muscle strain).  Dispense: 30 tablet; Refill: 1    4.  Chronic left-sided low back pain without sciatica  Assessment & Plan:  Will continue current medication as prescribed. Symptoms under control     Orders:  -     tiZANidine (ZANAFLEX) 4 MG tablet; Take 1 tablet (4 mg total) by mouth every 6 (six) hours as needed (muscle strain).  Dispense: 30 tablet; Refill: 1    5. Wheezing  Assessment & Plan:  Will continue current medication as prescribed. Symptoms under control     Orders:  -     budesonide-formoterol 160-4.5 mcg (SYMBICORT) 160-4.5 mcg/actuation HFAA; Inhale 2 puffs into the lungs every 12 (twelve) hours. Controller  Dispense: 10.2 g; Refill: 5  -     albuterol (PROVENTIL/VENTOLIN HFA) 90 mcg/actuation inhaler; Inhale 2 puffs into the lungs every 6 (six) hours as needed for Wheezing. INHALE TWO PUFFS BY MOUTH TWICE DAILY FOR SHORTNESS OF BREATH / not more THAN FOUR TIMES DAILY  Dispense: 18 g; Refill: 11  -     albuterol (PROVENTIL) 2.5 mg /3 mL (0.083 %) nebulizer solution; Take 3 mLs (2.5 mg total) by nebulization every 6 (six) hours as needed for Wheezing. Rescue  Dispense: 120 each; Refill: 1  -     Ambulatory referral/consult to Pulmonology; Future; Expected date: 12/19/2023    6. Seasonal allergies  Assessment & Plan:  Will continue current medication as prescribed. Symptoms under control     Orders:  -     cetirizine (ZYRTEC) 10 MG tablet; Take 1 tablet (10 mg total) by mouth once daily.  Dispense: 30 tablet; Refill: 5    7. Breast screening  Assessment & Plan:  Mamogram ordered for Nii everett    Orders:  -     Mammo Digital Screening Bilat; Future; Expected date: 12/12/2023    8. Fatigue, unspecified type  Assessment & Plan:  B12 and Folate    Orders:  -     Vitamin B12 & Folate; Future; Expected date: 12/12/2023  -     Vitamin D; Future; Expected date: 12/12/2023    9. Chronic obstructive pulmonary disease, unspecified COPD type  Assessment & Plan:  COPD is controlled. Current medical regimen is effective;   Will adjust according to results and  monitor.     Orders:  -     Ambulatory referral/consult to Pulmonology; Future; Expected date: 12/19/2023    Other orders  -     gabapentin (NEURONTIN) 600 MG tablet; Take 1 tablet (600 mg total) by mouth 3 (three) times daily.  Dispense: 90 tablet; Refill: 5  -     DULoxetine (CYMBALTA) 60 MG capsule; Take 1 capsule (60 mg total) by mouth once daily.  Dispense: 30 capsule; Refill: 5  -     hydrOXYzine pamoate (VISTARIL) 25 MG Cap; Take 1 capsule (25 mg total) by mouth every 8 (eight) hours as needed (anxiety).  Dispense: 30 capsule; Refill: 5  -     losartan (COZAAR) 50 MG tablet; Take 1 tablet (50 mg total) by mouth once daily.  Dispense: 30 tablet; Refill: 5  -     famotidine (PEPCID) 20 MG tablet; Take 1 tablet (20 mg total) by mouth 2 (two) times daily.  Dispense: 60 tablet; Refill: 5  -     pantoprazole (PROTONIX) 40 MG tablet; Take 1 tablet (40 mg total) by mouth 2 (two) times daily.  Dispense: 60 tablet; Refill: 5  -     clopidogreL (PLAVIX) 75 mg tablet; Take 1 tablet (75 mg total) by mouth once daily.  Dispense: 30 tablet; Refill: 5  -     amLODIPine (NORVASC) 10 MG tablet; Take 1 tablet (10 mg total) by mouth once daily.  Dispense: 30 tablet; Refill: 5               No follow-ups on file.

## 2023-12-13 PROBLEM — Z12.39 BREAST SCREENING: Status: ACTIVE | Noted: 2023-05-02

## 2023-12-13 PROBLEM — J30.2 SEASONAL ALLERGIES: Status: ACTIVE | Noted: 2023-12-13

## 2024-01-10 ENCOUNTER — OFFICE VISIT (OUTPATIENT)
Dept: FAMILY MEDICINE | Facility: CLINIC | Age: 54
End: 2024-01-10
Payer: COMMERCIAL

## 2024-01-10 VITALS
TEMPERATURE: 99 F | HEIGHT: 65 IN | WEIGHT: 224 LBS | RESPIRATION RATE: 18 BRPM | DIASTOLIC BLOOD PRESSURE: 85 MMHG | BODY MASS INDEX: 37.32 KG/M2 | OXYGEN SATURATION: 100 % | HEART RATE: 80 BPM | SYSTOLIC BLOOD PRESSURE: 137 MMHG

## 2024-01-10 DIAGNOSIS — Z11.1 VISIT FOR TB SKIN TEST: ICD-10-CM

## 2024-01-10 DIAGNOSIS — Z00.00 ENCOUNTER FOR PHYSICAL EXAMINATION: Primary | ICD-10-CM

## 2024-01-10 PROCEDURE — 99499 UNLISTED E&M SERVICE: CPT | Mod: ,,, | Performed by: NURSE PRACTITIONER

## 2024-01-10 PROCEDURE — 86580 TB INTRADERMAL TEST: CPT | Mod: ,,, | Performed by: NURSE PRACTITIONER

## 2024-01-10 RX ORDER — ESOMEPRAZOLE MAGNESIUM 40 MG/1
40 CAPSULE, DELAYED RELEASE ORAL
COMMUNITY

## 2024-01-10 NOTE — PROGRESS NOTES
Karina Macias DNP, JEMAL    [address] [place] [address]     PATIENT NAME: Kirti Morris  : 1970  DATE: 1/10/24  MRN: [id number]      Billing Provider: Karina Macias DNP, FNP  Level of Service:   Patient PCP Information       Provider PCP Type    EMILEE LAWRENCE NP General            Reason for Visit / Chief Complaint: Physical Exam (Patient is here for a physical and TB skin test.)       Update PCP  Update Chief Complaint         History of Present Illness / Problem Focused Workflow     Kirti Morris presents to the clinic with Physical Exam (Patient is here for a physical and TB skin test.)     Here for evaluation for foster parenting of family member.     Review of Systems     Review of Systems   Constitutional:  Negative for activity change, appetite change, chills, fatigue and fever.   HENT:  Negative for nasal congestion, ear pain, hearing loss, postnasal drip and sore throat.    Respiratory:  Negative for cough, chest tightness, shortness of breath and wheezing.    Cardiovascular:  Negative for chest pain, palpitations, leg swelling and claudication.   Gastrointestinal:  Negative for abdominal pain, change in bowel habit, constipation, diarrhea, nausea and vomiting.   Genitourinary:  Negative for dysuria.   Musculoskeletal:  Negative for arthralgias, back pain, gait problem and myalgias.   Integumentary:  Negative for rash.   Neurological:  Negative for weakness and headaches.   Psychiatric/Behavioral:  Negative for suicidal ideas. The patient is not nervous/anxious.         Medical / Social / Family History     Past Medical History:   Diagnosis Date    Angina at rest 2021    Anxiety     Arthritis     Asthma     Cerebral infarction 2021    Combined B12 and folate deficiency anemia 2020    COPD (chronic obstructive pulmonary disease)     Coronary artery disease     acute MI 2014    CRF (chronic renal failure)     GERD  (gastroesophageal reflux disease)     Hyperlipidemia     Hypertension     Myocardial infarction     Nonrheumatic mitral (valve) insufficiency 05/21/2018    Sleep apnea 01/06/2020    Stroke     Vitamin D deficiency 07/31/2020       Past Surgical History:   Procedure Laterality Date    CORONARY ANGIOPLASTY WITH STENT PLACEMENT  02/07/2014    HYSTERECTOMY      RADIOFREQUENCY ABLATION Right 10/13/2021    Procedure: RADIOFREQUENCY ABLATION;  Surgeon: David Combs MD;  Location: [facility];  Service: Pain Management;  Laterality: Right;  Right SI RFTC       Social History  Ms. Kirti Morris  reports that she has been smoking cigarettes. She started smoking about 38 years ago. She has a 38.0 pack-year smoking history. She has been exposed to tobacco smoke. She has never used smokeless tobacco. She reports that she does not currently use alcohol. She reports that she does not use drugs.    Family History  Ms. Kirti Morris's family history includes Arthritis in her mother; Ovarian cancer in her maternal aunt.    Medications and Allergies     Medications  Outpatient Medications Marked as Taking for the 1/10/24 encounter (Office Visit) with Karina Macias, CYRUS, FNP   Medication Sig Dispense Refill    albuterol (PROVENTIL) 2.5 mg /3 mL (0.083 %) nebulizer solution Take 3 mLs (2.5 mg total) by nebulization every 6 (six) hours as needed for Wheezing. Rescue 120 each 1    albuterol (PROVENTIL/VENTOLIN HFA) 90 mcg/actuation inhaler Inhale 2 puffs into the lungs every 6 (six) hours as needed for Wheezing. INHALE TWO PUFFS BY MOUTH TWICE DAILY FOR SHORTNESS OF BREATH / not more THAN FOUR TIMES DAILY 18 g 11    amLODIPine (NORVASC) 10 MG tablet Take 1 tablet (10 mg total) by mouth once daily. 30 tablet 5    ascorbic acid, vitamin C, (VITAMIN C) 500 MG tablet 500 mg.      aspirin (ECOTRIN) 81 MG EC tablet Take 81 mg by mouth once daily.      budesonide-formoterol 160-4.5 mcg (SYMBICORT) 160-4.5 mcg/actuation HFAA  Inhale 2 puffs into the lungs every 12 (twelve) hours. Controller 10.2 g 5    cetirizine (ZYRTEC) 10 MG tablet Take 1 tablet (10 mg total) by mouth once daily. 30 tablet 5    cholecalciferol, vitamin D3, (VITAMIN D3) 250 mcg (10,000 unit) Cap TAKE ONE CAPSULE BY MOUTH ONE TIME DAILY 90 capsule 0    clopidogreL (PLAVIX) 75 mg tablet Take 1 tablet (75 mg total) by mouth once daily. 30 tablet 5    DULoxetine (CYMBALTA) 60 MG capsule Take 1 capsule (60 mg total) by mouth once daily. 30 capsule 5    EPINEPHrine (EPIPEN) 0.3 mg/0.3 mL AtIn Inject 0.3 mLs (0.3 mg total) into the muscle once. for 1 dose 0.3 mL 0    esomeprazole (NEXIUM) 40 MG capsule Take 40 mg by mouth before breakfast.      ezetimibe (ZETIA) 10 mg tablet Take 10 mg by mouth once daily.      famotidine (PEPCID) 20 MG tablet Take 1 tablet (20 mg total) by mouth 2 (two) times daily. 60 tablet 5    fluticasone propionate (FLONASE) 50 mcg/actuation nasal spray 1 spray (50 mcg total) by Each Nostril route once daily. 15 g 2    gabapentin (NEURONTIN) 600 MG tablet Take 1 tablet (600 mg total) by mouth 3 (three) times daily. 90 tablet 5    HYDROcodone-acetaminophen (NORCO) 7.5-325 mg per tablet Take 1 tablet by mouth 2 (two) times daily as needed.      hydrOXYzine pamoate (VISTARIL) 25 MG Cap Take 1 capsule (25 mg total) by mouth every 8 (eight) hours as needed (anxiety). 30 capsule 5    losartan (COZAAR) 50 MG tablet Take 1 tablet (50 mg total) by mouth once daily. 30 tablet 5    multivitamin/iron/folic acid (CENTRUM COMPLETE ORAL) Take by mouth Daily.      mv-mn/FA/bl coh/isoflav/jujube (ESTROVEN MENOPAUSE ORAL) Take by mouth once daily.      nitroGLYCERIN (NITROSTAT) 0.4 MG SL tablet Place 1 tablet (0.4 mg total) under the tongue every 5 (five) minutes as needed for Chest pain. 25 tablet 1    nystatin (MYCOSTATIN) powder Apply topically 2 (two) times daily. 60 g 11    REPATHA SURECLICK 140 mg/mL PnIj Inject 140 mg into the skin every 14 (fourteen) days.       "tiZANidine (ZANAFLEX) 4 MG tablet Take 1 tablet (4 mg total) by mouth every 6 (six) hours as needed (muscle strain). 30 tablet 1    zinc gluconate 50 mg tablet Take 50 mg by mouth once daily. Takes as needed         Allergies  Review of patient's allergies indicates:   Allergen Reactions    Atorvastatin      LIPITOR    Dog dander      Dog hair & epithelia    House dust mite      D. Ptery, D. Farinae    Levsin [hyoscyamine sulfate]     Nuts [tree nut]      BRAZIL NUTS    Pravastatin     Sulfa (sulfonamide antibiotics)     Weed pollen-short ragweed        Physical Examination     Vitals:    01/10/24 1107   BP: 137/85   BP Location: Left arm   Patient Position: Sitting   BP Method: Large (Automatic)   Pulse: 80   Resp: 18   Temp: 99.1 °F (37.3 °C)   TempSrc: Oral   SpO2: 100%   Weight: 101.6 kg (224 lb)   Height: 5' 5" (1.651 m)     Physical Exam  Vitals and nursing note reviewed.   Constitutional:       General: She is not in acute distress.     Appearance: Normal appearance. She is not ill-appearing.   Eyes:      Extraocular Movements: Extraocular movements intact.      Pupils: Pupils are equal, round, and reactive to light.   Cardiovascular:      Rate and Rhythm: Normal rate and regular rhythm.      Heart sounds: Normal heart sounds.   Pulmonary:      Effort: Pulmonary effort is normal.      Breath sounds: Normal breath sounds.   Abdominal:      General: Bowel sounds are normal.      Palpations: Abdomen is soft.   Musculoskeletal:         General: Normal range of motion.   Skin:     Findings: No rash.   Neurological:      General: No focal deficit present.      Mental Status: She is alert and oriented to person, place, and time. Mental status is at baseline.   Psychiatric:         Mood and Affect: Mood normal.         Behavior: Behavior normal.          Assessment and Plan (including Health Maintenance)      Problem List  Smart Sets  Document Outside HM   :    Plan:         Health Maintenance Due   Topic Date Due    " Hepatitis C Screening  Never done    COVID-19 Vaccine (1) Never done    Pneumococcal Vaccines (Age 0-64) (1 - PCV) Never done    HIV Screening  Never done    TETANUS VACCINE  Never done    High Dose Statin  Never done    LDCT Lung Screen  Never done    Shingles Vaccine (1 of 2) Never done    Mammogram  07/23/2022    Influenza Vaccine (1) 09/01/2023       Problem List Items Addressed This Visit    None  Visit Diagnoses       Encounter for physical examination    -  Primary    Visit for TB skin test        Relevant Orders    POCT TB Skin Test Read (Completed)          Encounter for physical examination    Visit for TB skin test  -     POCT TB Skin Test Read       Health Maintenance Topics with due status: Not Due       Topic Last Completion Date    Colorectal Cancer Screening 04/25/2022    Hemoglobin A1c (Diabetic Prevention Screening) 05/23/2023    Lipid Panel 10/04/2023           Future Appointments   Date Time Provider Department Center   1/18/2024 10:00 AM [facility]   2/27/2024  1:15 PM Margarita Hodge MD [facility] [place]        Follow up if symptoms worsen or fail to improve.     Signature:  Karina Macias DNP, FNP  [address] [place] [address]  Phone #: [phone]  Fax #: [phone]    Date of encounter: 1/10/24    Patient Instructions   Follow up in 48-72 hours for TB test reading.

## 2024-01-12 ENCOUNTER — CLINICAL SUPPORT (OUTPATIENT)
Dept: FAMILY MEDICINE | Facility: CLINIC | Age: 54
End: 2024-01-12
Payer: COMMERCIAL

## 2024-01-12 DIAGNOSIS — Z11.1 VISIT FOR TB SKIN TEST: Primary | ICD-10-CM

## 2024-01-12 LAB
TB INDURATION - 48 HR READ: 0 MM
TB INDURATION - 72 HR READ: NORMAL
TB SKIN TEST - 48 HR READ: NEGATIVE
TB SKIN TEST - 72 HR READ: NORMAL

## 2024-01-30 ENCOUNTER — OFFICE VISIT (OUTPATIENT)
Dept: FAMILY MEDICINE | Facility: CLINIC | Age: 54
End: 2024-01-30
Payer: COMMERCIAL

## 2024-01-30 VITALS
WEIGHT: 227 LBS | HEIGHT: 65 IN | SYSTOLIC BLOOD PRESSURE: 137 MMHG | TEMPERATURE: 99 F | DIASTOLIC BLOOD PRESSURE: 91 MMHG | HEART RATE: 89 BPM | RESPIRATION RATE: 18 BRPM | OXYGEN SATURATION: 97 % | BODY MASS INDEX: 37.82 KG/M2

## 2024-01-30 DIAGNOSIS — Z65.3 CUSTODY ISSUE: Primary | ICD-10-CM

## 2024-01-30 DIAGNOSIS — K21.9 GASTROESOPHAGEAL REFLUX DISEASE, UNSPECIFIED WHETHER ESOPHAGITIS PRESENT: ICD-10-CM

## 2024-01-30 PROCEDURE — 1159F MED LIST DOCD IN RCRD: CPT | Mod: CPTII,,,

## 2024-01-30 PROCEDURE — 3008F BODY MASS INDEX DOCD: CPT | Mod: CPTII,,,

## 2024-01-30 PROCEDURE — 4010F ACE/ARB THERAPY RXD/TAKEN: CPT | Mod: CPTII,,,

## 2024-01-30 PROCEDURE — 1160F RVW MEDS BY RX/DR IN RCRD: CPT | Mod: CPTII,,,

## 2024-01-30 PROCEDURE — 99212 OFFICE O/P EST SF 10 MIN: CPT | Mod: ,,,

## 2024-01-30 PROCEDURE — 3075F SYST BP GE 130 - 139MM HG: CPT | Mod: CPTII,,,

## 2024-01-30 PROCEDURE — 3080F DIAST BP >= 90 MM HG: CPT | Mod: CPTII,,,

## 2024-01-30 NOTE — PROGRESS NOTES
Subjective     Patient ID: Kirti Morris is a 53 y.o. female.    Chief Complaint: Letter for School/Work (Patient states she takes care of Ceci Padilla (10 yo) since 11/2023 after Ceci's mother lost custody. States since she is taking cymbalta and vistaril she needs a letter stating she is fit to take in a foster child to return to [facility]. )      Pt is here today to have a letter written for custody with a 10 yo niece. [facility] needs a letter from her provider stating that she is mentally and physeally fit to care for her niece, bc she on Cymbalta and vistaril. She was originally placed on Cymbalta for the death of her Aunt that raised her and she could not stop crying. She takes vistaril for anxiety and only takes this medication if she needs for anxiety.   Pt has not been taking her Lopid due to increase in stomach pain.      She has been seeing Dr. Green and she has a hital hernia. She did have her esophagus dilated. She has been having GERD still . She is go to back and see him         Review of Systems   Constitutional:  Negative for activity change, appetite change and fatigue.   HENT:  Negative for trouble swallowing.    Eyes:  Negative for pain and visual disturbance.   Respiratory:  Negative for chest tightness and shortness of breath.    Cardiovascular:  Negative for chest pain and palpitations.   Gastrointestinal:  Negative for change in bowel habit.   Genitourinary:  Negative for difficulty urinating and dysuria.   Musculoskeletal:  Negative for arthralgias and myalgias.   Integumentary:  Negative for rash and wound.   Neurological:  Negative for weakness and headaches.   Psychiatric/Behavioral:  The patient is not nervous/anxious.             Objective     Physical Exam  Vitals and nursing note reviewed.   Constitutional:       Appearance: Normal appearance. She is not ill-appearing.   HENT:      Head: Normocephalic and atraumatic.      Nose: Nose normal.      Mouth/Throat:      Mouth: Mucous  membranes are moist.      Pharynx: Oropharynx is clear.   Eyes:      Extraocular Movements: Extraocular movements intact.      Conjunctiva/sclera: Conjunctivae normal.      Pupils: Pupils are equal, round, and reactive to light.   Cardiovascular:      Rate and Rhythm: Normal rate and regular rhythm.      Pulses: Normal pulses.      Heart sounds: Normal heart sounds.   Pulmonary:      Effort: Pulmonary effort is normal. No respiratory distress.      Breath sounds: Normal breath sounds.   Abdominal:      General: Bowel sounds are normal.      Tenderness: There is no abdominal tenderness.   Musculoskeletal:         General: Normal range of motion.      Cervical back: Normal range of motion and neck supple. No tenderness.   Skin:     General: Skin is warm and dry.      Capillary Refill: Capillary refill takes less than 2 seconds.   Neurological:      General: No focal deficit present.      Mental Status: She is alert and oriented to person, place, and time.   Psychiatric:         Mood and Affect: Mood normal.         Behavior: Behavior normal.         Thought Content: Thought content normal.         Judgment: Judgment normal.              Assessment and Plan     1. Custody issue  Assessment & Plan:  Needs letter sent to CPS for retirement application for niece that is living with her that parental rights were terminated.      2. Gastroesophageal reflux disease, unspecified whether esophagitis present  Assessment & Plan:  Pt has appt with follow up with Dr. Green at [facility].                 Follow up if symptoms worsen or fail to improve.

## 2024-01-30 NOTE — ASSESSMENT & PLAN NOTE
Needs letter sent to CPS for half-way application for niece that is living with her that parental rights were terminated.

## 2024-01-30 NOTE — LETTER
[facility]  [id number] [place] [address] [place] [address]  Phone: [phone]  Fax: [phone] 2024     Patient: Kirti Morris   YOB: 1970   Date of Visit: 2024       To Whom It May Concern:    I am writing to you today on behalf of Mrs. Kirti Morris. I am her primary care provider Ana Pollock, MSN, APRN, FNP-C. In knowing and providing care for Mrs. Morris I do believe that she is mentally and physically capable of taking care of the child in question, Elin Padilla : 2013.   Mrs. Morris is of good character, good moral standing and able to provide Elin with a safe home. Mrs. Morris takes Raleyn to school and picks her up daily, provides a safe home and care that she needs to grow and live in a loving home.    If you have any questions or concerns, please don't hesitate to contact my office [phone]    Sincerely,          ANA POLLOCK NP

## 2024-02-21 ENCOUNTER — CLINICAL SUPPORT (OUTPATIENT)
Dept: FAMILY MEDICINE | Facility: CLINIC | Age: 54
End: 2024-02-21
Payer: COMMERCIAL

## 2024-02-21 VITALS — SYSTOLIC BLOOD PRESSURE: 132 MMHG | DIASTOLIC BLOOD PRESSURE: 98 MMHG

## 2024-02-21 DIAGNOSIS — I10 HYPERTENSION, UNSPECIFIED TYPE: Primary | ICD-10-CM

## 2024-02-21 NOTE — PROGRESS NOTES
Patient presents to the clinic today for a blood pressure reading. BP today is 132/98. Patient stated that she believed she was instructed to stop labetaloL (NORMODYNE) 200 MG tablet. After speaking with Ana Pollock I advised patient that she was not instructed to stop the labetalol. Patient instructed to take labetaloL (NORMODYNE) 200 MG tablet; Take 1 tablet (200 mg total) by mouth 2 (two) times daily. Patient did not have any questions and voiced understanding.

## 2024-02-26 ENCOUNTER — OFFICE VISIT (OUTPATIENT)
Dept: FAMILY MEDICINE | Facility: CLINIC | Age: 54
End: 2024-02-26
Payer: COMMERCIAL

## 2024-02-26 VITALS
DIASTOLIC BLOOD PRESSURE: 82 MMHG | SYSTOLIC BLOOD PRESSURE: 126 MMHG | OXYGEN SATURATION: 96 % | RESPIRATION RATE: 18 BRPM | BODY MASS INDEX: 36.32 KG/M2 | WEIGHT: 218 LBS | TEMPERATURE: 98 F | HEIGHT: 65 IN | HEART RATE: 68 BPM

## 2024-02-26 DIAGNOSIS — R30.0 DYSURIA: ICD-10-CM

## 2024-02-26 DIAGNOSIS — R10.30 LOWER ABDOMINAL PAIN: ICD-10-CM

## 2024-02-26 DIAGNOSIS — K57.92 DIVERTICULITIS: Primary | ICD-10-CM

## 2024-02-26 DIAGNOSIS — B37.0 ORAL CANDIDIASIS: ICD-10-CM

## 2024-02-26 LAB
BASOPHILS # BLD AUTO: 0.06 K/UL (ref 0–0.2)
BASOPHILS NFR BLD AUTO: 0.8 % (ref 0–1)
BILIRUB SERPL-MCNC: ABNORMAL MG/DL
BLOOD, POC UA: ABNORMAL
DIFFERENTIAL METHOD BLD: ABNORMAL
EOSINOPHIL # BLD AUTO: 0.19 K/UL (ref 0–0.5)
EOSINOPHIL NFR BLD AUTO: 2.4 % (ref 1–4)
ERYTHROCYTE [DISTWIDTH] IN BLOOD BY AUTOMATED COUNT: 12.9 % (ref 11.5–14.5)
GLUCOSE UR QL STRIP: ABNORMAL
HCT VFR BLD AUTO: 43 % (ref 38–47)
HGB BLD-MCNC: 14.3 G/DL (ref 12–16)
IMM GRANULOCYTES # BLD AUTO: 0.03 K/UL (ref 0–0.04)
IMM GRANULOCYTES NFR BLD: 0.4 % (ref 0–0.4)
KETONES UR QL STRIP: ABNORMAL
LEUKOCYTE ESTERASE URINE, POC: ABNORMAL
LYMPHOCYTES # BLD AUTO: 2.84 K/UL (ref 1–4.8)
LYMPHOCYTES NFR BLD AUTO: 35.7 % (ref 27–41)
MCH RBC QN AUTO: 32 PG (ref 27–31)
MCHC RBC AUTO-ENTMCNC: 33.3 G/DL (ref 32–36)
MCV RBC AUTO: 96.2 FL (ref 80–96)
MONOCYTES # BLD AUTO: 0.42 K/UL (ref 0–0.8)
MONOCYTES NFR BLD AUTO: 5.3 % (ref 2–6)
MPC BLD CALC-MCNC: 11.9 FL (ref 9.4–12.4)
NEUTROPHILS # BLD AUTO: 4.41 K/UL (ref 1.8–7.7)
NEUTROPHILS NFR BLD AUTO: 55.4 % (ref 53–65)
NITRITE, POC UA: ABNORMAL
NRBC # BLD AUTO: 0 X10E3/UL
NRBC, AUTO (.00): 0 %
PH, POC UA: 6
PLATELET # BLD AUTO: 289 K/UL (ref 150–400)
PROTEIN, POC: ABNORMAL
RBC # BLD AUTO: 4.47 M/UL (ref 4.2–5.4)
SPECIFIC GRAVITY, POC UA: 1.02
UROBILINOGEN, POC UA: 1
WBC # BLD AUTO: 7.95 K/UL (ref 4.5–11)

## 2024-02-26 PROCEDURE — 3008F BODY MASS INDEX DOCD: CPT | Mod: CPTII,,,

## 2024-02-26 PROCEDURE — 3079F DIAST BP 80-89 MM HG: CPT | Mod: CPTII,,,

## 2024-02-26 PROCEDURE — 80053 COMPREHEN METABOLIC PANEL: CPT | Mod: ,,, | Performed by: CLINICAL MEDICAL LABORATORY

## 2024-02-26 PROCEDURE — 1159F MED LIST DOCD IN RCRD: CPT | Mod: CPTII,,,

## 2024-02-26 PROCEDURE — 99214 OFFICE O/P EST MOD 30 MIN: CPT | Mod: ,,,

## 2024-02-26 PROCEDURE — 3074F SYST BP LT 130 MM HG: CPT | Mod: CPTII,,,

## 2024-02-26 PROCEDURE — 85025 COMPLETE CBC W/AUTO DIFF WBC: CPT | Mod: ,,, | Performed by: CLINICAL MEDICAL LABORATORY

## 2024-02-26 PROCEDURE — 1160F RVW MEDS BY RX/DR IN RCRD: CPT | Mod: CPTII,,,

## 2024-02-26 PROCEDURE — 4010F ACE/ARB THERAPY RXD/TAKEN: CPT | Mod: CPTII,,,

## 2024-02-26 PROCEDURE — 81003 URINALYSIS AUTO W/O SCOPE: CPT | Mod: QW,,,

## 2024-02-26 RX ORDER — METRONIDAZOLE 500 MG/1
500 TABLET ORAL EVERY 12 HOURS
Qty: 20 TABLET | Refills: 0 | Status: SHIPPED | OUTPATIENT
Start: 2024-02-26 | End: 2024-05-28 | Stop reason: ALTCHOICE

## 2024-02-26 RX ORDER — CIPROFLOXACIN 500 MG/1
500 TABLET ORAL EVERY 12 HOURS
Qty: 20 TABLET | Refills: 0 | Status: SHIPPED | OUTPATIENT
Start: 2024-02-26 | End: 2024-05-28 | Stop reason: ALTCHOICE

## 2024-02-26 RX ORDER — NYSTATIN 100000 [USP'U]/ML
4 SUSPENSION ORAL 4 TIMES DAILY
Qty: 160 ML | Refills: 0 | Status: SHIPPED | OUTPATIENT
Start: 2024-02-26 | End: 2024-05-28 | Stop reason: ALTCHOICE

## 2024-02-26 NOTE — PROGRESS NOTES
Subjective     Patient ID: Kirti Morris is a 53 y.o. female.    Chief Complaint: Pelvic Pain (Pelvic pain X4 days. Low pelvic pain that hurts worse after eating and drinking. Rates the pain 7/10 on the pain scale. History of diverticulitis and states this pain is similar. /) and Health Maintenance (Hepatitis C Screening Never done-Declined /COVID-19 Vaccine(1) Never done-Declined/Pneumococcal Vaccines (Age 0-64)(1 of 2 - PCV) Never done-Declined/HIV Screening Never done-Declined/TETANUS VACCINE Never done-Declined/High Dose Statin Never done-Declined/LDCT Lung Screen Never done-Declined /Shingles Vaccine(1 of 2) Never done-Declined /Mammogram due on 07/23/2022-Scheduled /Influenza Vaccine(1) due on 09/01/2023-[facility] [place], [place]/)      Pt  is having lower abd pain since Thursday evening, unsure if she has had fever. She has hx of diverticulosis, Pt states she has seen Dr. Green for GI issues. She does have slow emptying of her stomach. Every time she eats and it increases her pain. She is feeling bloated. When she feels the rumbling it causes the pain to increase to 10/10. Denies nausea. She is eating but not her normal amount.   Pt state she has noticed some dark stools. Pt has est care with GI and will call and make an appt for this issue with him to be checked on.    Pelvic Pain  The patient's primary symptoms include pelvic pain. This is a new problem. The current episode started in the past 7 days. The problem occurs constantly. The problem has been unchanged. The pain is severe. The problem affects both sides. Associated symptoms include abdominal pain, diarrhea and dysuria. Pertinent negatives include no headaches, nausea, rash or vomiting.       Review of Systems   Constitutional:  Negative for activity change, appetite change and fatigue.   HENT:  Negative for trouble swallowing.    Eyes:  Negative for pain and visual disturbance.   Respiratory:  Negative for chest tightness and shortness of breath.     Cardiovascular:  Negative for chest pain and palpitations.   Gastrointestinal:  Positive for abdominal distention, abdominal pain and diarrhea. Negative for change in bowel habit, nausea and vomiting.        Has noticed dark stools   Genitourinary:  Positive for dysuria and pelvic pain. Negative for difficulty urinating.   Musculoskeletal:  Negative for arthralgias and myalgias.   Integumentary:  Negative for rash and wound.   Neurological:  Negative for weakness and headaches.   Psychiatric/Behavioral:  The patient is not nervous/anxious.             Objective     Physical Exam  Vitals and nursing note reviewed.   Constitutional:       Appearance: Normal appearance. She is not ill-appearing.   HENT:      Head: Normocephalic and atraumatic.      Nose: Nose normal.      Mouth/Throat:      Mouth: Mucous membranes are moist.      Pharynx: Oropharynx is clear.   Eyes:      Extraocular Movements: Extraocular movements intact.      Conjunctiva/sclera: Conjunctivae normal.      Pupils: Pupils are equal, round, and reactive to light.   Cardiovascular:      Rate and Rhythm: Normal rate and regular rhythm.      Pulses: Normal pulses.      Heart sounds: Normal heart sounds.   Pulmonary:      Effort: Pulmonary effort is normal. No respiratory distress.      Breath sounds: Normal breath sounds.   Abdominal:      General: Bowel sounds are normal. There is distension.      Tenderness: There is abdominal tenderness. There is guarding.          Comments: X-ray looks to have a good amount of stool and mostly over most tender area as diagramed.   Will treat for diverticulitis at this time. Inst pat that she needed to get back in with her GI doctor to discuss dark stools asap.    Musculoskeletal:         General: Normal range of motion.      Cervical back: Normal range of motion and neck supple. No tenderness.   Skin:     General: Skin is warm and dry.   Neurological:      General: No focal deficit present.      Mental Status: She is  alert and oriented to person, place, and time.   Psychiatric:         Mood and Affect: Mood normal.         Behavior: Behavior normal.         Thought Content: Thought content normal.         Judgment: Judgment normal.              Assessment and Plan     1. Diverticulitis  Assessment & Plan:  Cipro 500 mg po BID  Flagyl 500 mg BID   Pt to follow up with her GI Dr. Green    Orders:  -     X-Ray Abdomen Flat And Erect; Future; Expected date: 02/26/2024  -     metroNIDAZOLE (FLAGYL) 500 MG tablet; Take 1 tablet (500 mg total) by mouth every 12 (twelve) hours.  Dispense: 20 tablet; Refill: 0  -     ciprofloxacin HCl (CIPRO) 500 MG tablet; Take 1 tablet (500 mg total) by mouth every 12 (twelve) hours.  Dispense: 20 tablet; Refill: 0  -     CBC Auto Differential; Future; Expected date: 02/26/2024  -     Comprehensive Metabolic Panel; Future; Expected date: 02/26/2024    2. Dysuria  Assessment & Plan:  Urinalysis today. Clear. No WBC    Orders:  -     POCT Urinalysis    3. Lower abdominal pain  Assessment & Plan:  Xray abd  US abd  CBC CMP    Orders:  -     US Abdomen Complete; Future; Expected date: 02/26/2024  -     CBC Auto Differential; Future; Expected date: 02/26/2024  -     Comprehensive Metabolic Panel; Future; Expected date: 02/26/2024    4. Oral candidiasis  Assessment & Plan:  Pt prone to oral candidiasis with treatment of antibiotics    Orders:  -     nystatin (MYCOSTATIN) 100,000 unit/mL suspension; Take 4 mLs (400,000 Units total) by mouth 4 (four) times daily. for 10 days  Dispense: 160 mL; Refill: 0               Follow up in about 2 weeks (around 3/11/2024).  I, Keaton Heath MD, have reviewed the chart and agree with the assessment and plan presented above.

## 2024-02-26 NOTE — PATIENT INSTRUCTIONS
We will call you with your labs and x-ray report    Clear liquid diet today for up to 72 hours then low residue diet 5-7 days then slowly advance to high fiber     Avoid NSAIDS    If you begin to have fever, more severe abd pain, rectal bleeding or bloody stools go straight to ED nearest to you.

## 2024-02-27 LAB
ALBUMIN SERPL BCP-MCNC: 3.7 G/DL (ref 3.5–5)
ALBUMIN/GLOB SERPL: 1.1 {RATIO}
ALP SERPL-CCNC: 119 U/L (ref 41–108)
ALT SERPL W P-5'-P-CCNC: 23 U/L (ref 13–56)
ANION GAP SERPL CALCULATED.3IONS-SCNC: 8 MMOL/L (ref 7–16)
AST SERPL W P-5'-P-CCNC: 19 U/L (ref 15–37)
BILIRUB SERPL-MCNC: 0.3 MG/DL (ref ?–1.2)
BUN SERPL-MCNC: 14 MG/DL (ref 7–18)
BUN/CREAT SERPL: 9 (ref 6–20)
CALCIUM SERPL-MCNC: 9.4 MG/DL (ref 8.5–10.1)
CHLORIDE SERPL-SCNC: 108 MMOL/L (ref 98–107)
CO2 SERPL-SCNC: 26 MMOL/L (ref 21–32)
CREAT SERPL-MCNC: 1.63 MG/DL (ref 0.55–1.02)
EGFR (NO RACE VARIABLE) (RUSH/TITUS): 38 ML/MIN/1.73M2
GLOBULIN SER-MCNC: 3.5 G/DL (ref 2–4)
GLUCOSE SERPL-MCNC: 87 MG/DL (ref 74–106)
POTASSIUM SERPL-SCNC: 4.2 MMOL/L (ref 3.5–5.1)
PROT SERPL-MCNC: 7.2 G/DL (ref 6.4–8.2)
SODIUM SERPL-SCNC: 138 MMOL/L (ref 136–145)

## 2024-02-29 ENCOUNTER — HOSPITAL ENCOUNTER (OUTPATIENT)
Dept: RADIOLOGY | Facility: HOSPITAL | Age: 54
Discharge: HOME | End: 2024-02-29
Payer: COMMERCIAL

## 2024-02-29 ENCOUNTER — HOSPITAL ENCOUNTER (EMERGENCY)
Facility: HOSPITAL | Age: 54
Discharge: HOME | End: 2024-02-29
Payer: COMMERCIAL

## 2024-02-29 VITALS
OXYGEN SATURATION: 95 % | DIASTOLIC BLOOD PRESSURE: 82 MMHG | WEIGHT: 217 LBS | BODY MASS INDEX: 36.15 KG/M2 | RESPIRATION RATE: 16 BRPM | HEIGHT: 65 IN | TEMPERATURE: 96 F | SYSTOLIC BLOOD PRESSURE: 155 MMHG | HEART RATE: 80 BPM

## 2024-02-29 DIAGNOSIS — R10.84 GENERALIZED ABDOMINAL PAIN: Primary | ICD-10-CM

## 2024-02-29 DIAGNOSIS — R10.30 LOWER ABDOMINAL PAIN: ICD-10-CM

## 2024-02-29 DIAGNOSIS — R00.2 PALPITATIONS: ICD-10-CM

## 2024-02-29 LAB
ALBUMIN SERPL BCP-MCNC: 3.8 G/DL (ref 3.5–5)
ALBUMIN/GLOB SERPL: 1.2 {RATIO}
ALP SERPL-CCNC: 118 U/L (ref 41–108)
ALT SERPL W P-5'-P-CCNC: 23 U/L (ref 13–56)
ANION GAP SERPL CALCULATED.3IONS-SCNC: 16 MMOL/L (ref 7–16)
AST SERPL W P-5'-P-CCNC: 20 U/L (ref 15–37)
BACTERIA #/AREA URNS HPF: ABNORMAL /HPF
BASOPHILS # BLD AUTO: 0.06 K/UL (ref 0–0.2)
BASOPHILS NFR BLD AUTO: 0.7 % (ref 0–1)
BILIRUB SERPL-MCNC: 0.2 MG/DL (ref ?–1.2)
BILIRUB UR QL STRIP: NEGATIVE
BUN SERPL-MCNC: 13 MG/DL (ref 7–18)
BUN/CREAT SERPL: 6 (ref 6–20)
CALCIUM SERPL-MCNC: 9.6 MG/DL (ref 8.5–10.1)
CAOX CRY #/AREA URNS LPF: ABNORMAL /LPF
CHLORIDE SERPL-SCNC: 103 MMOL/L (ref 98–107)
CLARITY UR: CLEAR
CO2 SERPL-SCNC: 25 MMOL/L (ref 21–32)
COARSE GRAN CASTS #/AREA URNS LPF: ABNORMAL /LPF
COLOR UR: YELLOW
CREAT SERPL-MCNC: 2.04 MG/DL (ref 0.55–1.02)
DIFFERENTIAL METHOD BLD: ABNORMAL
EGFR (NO RACE VARIABLE) (RUSH/TITUS): 29 ML/MIN/1.73M2
EOSINOPHIL # BLD AUTO: 0.25 K/UL (ref 0–0.5)
EOSINOPHIL NFR BLD AUTO: 2.8 % (ref 1–4)
ERYTHROCYTE [DISTWIDTH] IN BLOOD BY AUTOMATED COUNT: 12.9 % (ref 11.5–14.5)
GLOBULIN SER-MCNC: 3.2 G/DL (ref 2–4)
GLUCOSE SERPL-MCNC: 122 MG/DL (ref 74–106)
GLUCOSE UR STRIP-MCNC: NEGATIVE MG/DL
HCT VFR BLD AUTO: 44.5 % (ref 38–47)
HGB BLD-MCNC: 14.3 G/DL (ref 12–16)
HYALINE CASTS #/AREA URNS LPF: ABNORMAL /LPF
KETONES UR STRIP-SCNC: NEGATIVE MG/DL
LACTATE SERPL-SCNC: 1.1 MMOL/L (ref 0.4–2)
LEUKOCYTE ESTERASE UR QL STRIP: NEGATIVE
LIPASE SERPL-CCNC: 104 U/L (ref 16–77)
LYMPHOCYTES # BLD AUTO: 3.76 K/UL (ref 1–4.8)
LYMPHOCYTES NFR BLD AUTO: 42 % (ref 27–41)
MAGNESIUM SERPL-MCNC: 2 MG/DL (ref 1.7–2.3)
MCH RBC QN AUTO: 31.4 PG (ref 27–31)
MCHC RBC AUTO-ENTMCNC: 32.1 G/DL (ref 32–36)
MCV RBC AUTO: 97.8 FL (ref 80–96)
MONOCYTES # BLD AUTO: 0.74 K/UL (ref 0–0.8)
MONOCYTES NFR BLD AUTO: 8.3 % (ref 2–6)
MPC BLD CALC-MCNC: 11.1 FL (ref 9.4–12.4)
MUCOUS THREADS #/AREA URNS HPF: ABNORMAL /HPF
NEUTROPHILS # BLD AUTO: 4.14 K/UL (ref 1.8–7.7)
NEUTROPHILS NFR BLD AUTO: 46.2 % (ref 53–65)
NITRITE UR QL STRIP: POSITIVE
PH UR STRIP: 5.5 PH UNITS
PLATELET # BLD AUTO: 280 K/UL (ref 150–400)
POTASSIUM SERPL-SCNC: 3.9 MMOL/L (ref 3.5–5.1)
PROT SERPL-MCNC: 7 G/DL (ref 6.4–8.2)
PROT UR QL STRIP: ABNORMAL
RBC # BLD AUTO: 4.55 M/UL (ref 4.2–5.4)
RBC # UR STRIP: NEGATIVE /UL
RBC #/AREA URNS HPF: ABNORMAL /HPF
SODIUM SERPL-SCNC: 140 MMOL/L (ref 136–145)
SP GR UR STRIP: >=1.03
SQUAMOUS #/AREA URNS LPF: ABNORMAL /LPF
TROPONIN I SERPL DL<=0.01 NG/ML-MCNC: 7.9 PG/ML
UROBILINOGEN UR STRIP-ACNC: 0.2 MG/DL
WBC # BLD AUTO: 8.95 K/UL (ref 4.5–11)
WBC #/AREA URNS HPF: ABNORMAL /HPF

## 2024-02-29 PROCEDURE — 96374 THER/PROPH/DIAG INJ IV PUSH: CPT

## 2024-02-29 PROCEDURE — 99284 EMERGENCY DEPT VISIT MOD MDM: CPT | Mod: ,,, | Performed by: REGISTERED NURSE

## 2024-02-29 PROCEDURE — 96361 HYDRATE IV INFUSION ADD-ON: CPT

## 2024-02-29 PROCEDURE — 83605 ASSAY OF LACTIC ACID: CPT | Performed by: REGISTERED NURSE

## 2024-02-29 PROCEDURE — 83690 ASSAY OF LIPASE: CPT | Performed by: REGISTERED NURSE

## 2024-02-29 PROCEDURE — 93005 ELECTROCARDIOGRAM TRACING: CPT

## 2024-02-29 PROCEDURE — 80053 COMPREHEN METABOLIC PANEL: CPT | Performed by: REGISTERED NURSE

## 2024-02-29 PROCEDURE — 83735 ASSAY OF MAGNESIUM: CPT | Performed by: REGISTERED NURSE

## 2024-02-29 PROCEDURE — 81003 URINALYSIS AUTO W/O SCOPE: CPT | Performed by: REGISTERED NURSE

## 2024-02-29 PROCEDURE — 85025 COMPLETE CBC W/AUTO DIFF WBC: CPT | Performed by: REGISTERED NURSE

## 2024-02-29 PROCEDURE — 76700 US EXAM ABDOM COMPLETE: CPT | Mod: TC

## 2024-02-29 PROCEDURE — 63600175 PHARM REV CODE 636 W HCPCS: Performed by: REGISTERED NURSE

## 2024-02-29 PROCEDURE — 96375 TX/PRO/DX INJ NEW DRUG ADDON: CPT

## 2024-02-29 PROCEDURE — 93010 ELECTROCARDIOGRAM REPORT: CPT | Mod: ,,, | Performed by: HOSPITALIST

## 2024-02-29 PROCEDURE — 84484 ASSAY OF TROPONIN QUANT: CPT | Performed by: REGISTERED NURSE

## 2024-02-29 PROCEDURE — 99285 EMERGENCY DEPT VISIT HI MDM: CPT | Mod: 25

## 2024-02-29 RX ORDER — ONDANSETRON HYDROCHLORIDE 2 MG/ML
4 INJECTION, SOLUTION INTRAVENOUS
Status: COMPLETED | OUTPATIENT
Start: 2024-02-29 | End: 2024-02-29

## 2024-02-29 RX ORDER — HALOPERIDOL 5 MG/ML
2 INJECTION INTRAMUSCULAR
Status: COMPLETED | OUTPATIENT
Start: 2024-02-29 | End: 2024-02-29

## 2024-02-29 RX ADMIN — HALOPERIDOL LACTATE 2 MG: 5 INJECTION, SOLUTION INTRAMUSCULAR at 08:02

## 2024-02-29 RX ADMIN — ONDANSETRON 4 MG: 2 INJECTION INTRAMUSCULAR; INTRAVENOUS at 07:02

## 2024-02-29 RX ADMIN — SODIUM CHLORIDE, POTASSIUM CHLORIDE, SODIUM LACTATE AND CALCIUM CHLORIDE 1000 ML: 600; 310; 30; 20 INJECTION, SOLUTION INTRAVENOUS at 07:02

## 2024-03-01 DIAGNOSIS — K80.20 CALCULUS OF GALLBLADDER WITHOUT CHOLECYSTITIS WITHOUT OBSTRUCTION: Primary | ICD-10-CM

## 2024-03-01 DIAGNOSIS — K57.90 DIVERTICULOSIS: ICD-10-CM

## 2024-03-01 DIAGNOSIS — K21.9 GASTROESOPHAGEAL REFLUX DISEASE, UNSPECIFIED WHETHER ESOPHAGITIS PRESENT: ICD-10-CM

## 2024-03-01 DIAGNOSIS — K57.92 DIVERTICULITIS: ICD-10-CM

## 2024-03-01 NOTE — DISCHARGE INSTRUCTIONS
Call your GI doctor in the AM for further eval. Return to the ED for any new or worsening problems or otherwise as needed.

## 2024-03-01 NOTE — ED TRIAGE NOTES
Pt presents with diffuse abd pain and belching for 2 weeks.  Seen by PCP 3 days ago and had an ultrasound of her abd today.  States the medication prescribed to her made her feel bad and gave her left arm pain and hypertension measured at home at 160/105 she was concerned she may be having a reaction.  She has been on liquid diet for the last 3 days for n/v/d.

## 2024-03-01 NOTE — ED PROVIDER NOTES
"Encounter Date: 2/29/2024       History     Chief Complaint   Patient presents with    Abdominal Pain     53 y-old  female received to ED with complaint of generalized abdominal pain and "belching." Patient states that her symptoms have been ongoing for several days, but became acutely worse after taking levsin which was prescribed by her GI doctor. She states that approximately 1 hour PTA she started having palpitations and pain to her left arm. She states that this was also around 20 minutes after taking the levsin. Patient had a GB US earlier today that showed cholelithiasis without any evidence of cholecystitis.       Review of patient's allergies indicates:   Allergen Reactions    Atorvastatin      LIPITOR    Dog dander      Dog hair & epithelia    House dust mite      D. Ptery, D. 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    COPD (chronic obstructive pulmonary disease)     Coronary artery disease     acute MI 02/2014    CRF (chronic renal failure)     GERD (gastroesophageal reflux disease)     Hyperlipidemia     Hypertension     Myocardial infarction     Nonrheumatic mitral (valve) insufficiency 05/21/2018    Sleep apnea 01/06/2020    Stroke     Vitamin D deficiency 07/31/2020     Past Surgical History:   Procedure Laterality Date    CORONARY ANGIOPLASTY WITH STENT PLACEMENT  02/07/2014    HYSTERECTOMY      RADIOFREQUENCY ABLATION Right 10/13/2021    Procedure: RADIOFREQUENCY ABLATION;  Surgeon: David Combs MD;  Location: [facility];  Service: Pain Management;  Laterality: Right;  Right SI RFTC     Family History   Problem Relation Age of Onset    Ovarian cancer Maternal " Aunt     Arthritis Mother      Social History     Tobacco Use    Smoking status: Every Day     Current packs/day: 1.00     Average packs/day: 1 pack/day for 38.2 years (38.2 ttl pk-yrs)     Types: Cigarettes     Start date: 1986     Passive exposure: Current    Smokeless tobacco: Never    Tobacco comments:     Smoker since age 16. Never had a CT of chest   Substance Use Topics    Alcohol use: Not Currently     Comment: social    Drug use: Never     Review of Systems   Constitutional: Negative.    HENT: Negative.     Eyes: Negative.    Respiratory: Negative.     Cardiovascular: Negative.    Gastrointestinal:  Positive for abdominal pain (Generalized that patient states has been ongoing for several weeks.) and nausea. Negative for constipation.   Genitourinary:  Positive for decreased urine volume.   Musculoskeletal: Negative.    Skin: Negative.    Allergic/Immunologic: Negative.    Neurological: Negative.    Hematological: Negative.    Psychiatric/Behavioral: Negative.         Physical Exam     Initial Vitals [02/29/24 1932]   BP Pulse Resp Temp SpO2   (!) 155/82 80 16 96.4 °F (35.8 °C) 95 %      MAP       --         Physical Exam    Nursing note and vitals reviewed.  Constitutional: She appears well-developed and well-nourished.   HENT:   Head: Normocephalic and atraumatic.   Right Ear: External ear normal.   Left Ear: External ear normal.   Nose: Nose normal.   Mouth/Throat: Oropharynx is clear and moist.   Eyes: Conjunctivae are normal.   Neck: Neck supple.   Normal range of motion.  Cardiovascular:  Normal rate, regular rhythm, normal heart sounds and intact distal pulses.           Pulmonary/Chest: Breath sounds normal.   Abdominal: Abdomen is soft. Bowel sounds are normal. She exhibits no distension and no mass. There is no abdominal tenderness. There is no rebound and no guarding.   Musculoskeletal:         General: Normal range of motion.      Cervical back: Normal range of motion and neck supple.      Neurological: She is alert and oriented to person, place, and time. She has normal strength. GCS score is 15. GCS eye subscore is 4. GCS verbal subscore is 5. GCS motor subscore is 6.   Skin: Skin is warm and dry. Capillary refill takes less than 2 seconds.   Psychiatric: She has a normal mood and affect. Her behavior is normal. Judgment and thought content normal.         Medical Screening Exam   See Full Note    ED Course   Procedures  Labs Reviewed   COMPREHENSIVE METABOLIC PANEL - Abnormal; Notable for the following components:       Result Value    Glucose 122 (*)     Creatinine 2.04 (*)     Alk Phos 118 (*)     eGFR 29 (*)     All other components within normal limits   LIPASE - Abnormal; Notable for the following components:    Lipase 104 (*)     All other components within normal limits   CBC WITH DIFFERENTIAL - Abnormal; Notable for the following components:    MCV 97.8 (*)     MCH 31.4 (*)     Neutrophils % 46.2 (*)     Lymphocytes % 42.0 (*)     Monocytes % 8.3 (*)     All other components within normal limits   URINALYSIS, REFLEX TO URINE CULTURE - Abnormal; Notable for the following components:    Nitrites, UA Positive (*)     Protein, UA Trace (*)     Specific Gravity, UA >=1.030 (*)     All other components within normal limits   URINALYSIS, MICROSCOPIC - Abnormal; Notable for the following components:    Bacteria, UA Occasional (*)     Squamous Epithelial Cells, UA Few (*)     Mucus, UA Few (*)     Hyaline Casts, UA 0-2 (*)     Coarse Granular Casts, UA Few (*)     Calcium Oxalate Crystals, UA Few (*)     All other components within normal limits   TROPONIN I - Normal   MAGNESIUM - Normal   LACTIC ACID, PLASMA - Normal   CBC W/ AUTO DIFFERENTIAL    Narrative:     The following orders were created for panel order CBC auto differential.  Procedure                               Abnormality         Status                     ---------                               -----------         ------            "          CBC with Differential[[id number]]       Abnormal            Final result                 Please view results for these tests on the individual orders.        ECG Results              EKG 12-lead (In process)        Collection Time Result Time QRS Duration OHS QTC Calculation    02/29/24 19:50:27 02/29/24 20:37:33 82 423                     In process by Interface, Lab In [facility] (02/29/24 20:37:41)                   Narrative:    Test Reason : R00.2,    Vent. Rate : 063 BPM     Atrial Rate : 063 BPM     P-R Int : 114 ms          QRS Dur : 082 ms      QT Int : 414 ms       P-R-T Axes : 067 052 078 degrees     QTc Int : 423 ms    Normal sinus rhythm with sinus arrhythmia  Nonspecific T wave abnormality  Abnormal ECG  When compared with ECG of 09-JUL-2023 21:25,  No significant change was found    Referred By: [email or web address]   SELF           Confirmed By:                                   Imaging Results              X-Ray Abdomen Flat And Erect (In process)                      Medications   lactated ringers bolus 1,000 mL (1,000 mLs Intravenous New Bag 2/29/24 1949)   ondansetron injection 4 mg (4 mg Intravenous Given 2/29/24 1951)   haloperidol lactate injection 2 mg (2 mg Intravenous Given 2/29/24 2006)     Medical Decision Making  53 y-old  female received to ED with complaint of generalized abdominal pain and "belching." Patient states that her symptoms have been ongoing for several days, but became acutely worse after taking levsin which was prescribed by her GI doctor. She states that approximately 1 hour PTA she started having palpitations and pain to her left arm. She states that this was also around 20 minutes after taking the levsin. Patient had a GB US earlier today that showed cholelithiasis without any evidence of cholecystitis.     Amount and/or Complexity of Data Reviewed  External Data Reviewed: radiology.     Details: Abdominal US: Cholelithiasis without evidence to suggest " cholecystitis.Gallbladder: Several gallstones are seen within the gallbladder.  No gallbladder wall thickening or pericholecystic fluid.  Biliary system: 11 mm common bile duct.  No intrahepatic ductal dilatation.    Labs: ordered.     Details: 8.95 WBC, 4.14 Absolute Neutrophil Count, BUN/Creat 13/2.04, Lipase 104, Lactic 1.1, Trop 7.9  Radiology: ordered.     Details: No rosalind obstruction noted on abdominal film. Awaiting Radiologist interpretation.     Risk  Prescription drug management.  Risk Details: Patients abdominal pain/cramping/nausea completely resolved with IV haldol. Patients Lipase is slightly elevated with US showing normal biliary duct. US also shows cholelithiasis without evidence of cholecystitis and a normal WBC. I discussed work up at length with patient including my recommendation for repeat troponin and f/u with PCP. Patient states that she feels much better and would rather go home. Will discharge home with instructions to f/u                                       Clinical Impression:   Final diagnoses:  [R00.2] Palpitations  [R10.84] Generalized abdominal pain (Primary)        ED Disposition Condition    Discharge Stable          ED Prescriptions    None       Follow-up Information       Follow up With Specialties Details Why Contact Info    Ana Pollock NP Family Medicine In 2 days As needed [id number] [facility] [address]  [place] [address]  [phone]               Wong Bourne NP-C  02/29/24 1271

## 2024-03-05 ENCOUNTER — OFFICE VISIT (OUTPATIENT)
Dept: SURGERY | Facility: CLINIC | Age: 54
End: 2024-03-05
Attending: SURGERY
Payer: COMMERCIAL

## 2024-03-05 VITALS — HEIGHT: 65 IN | BODY MASS INDEX: 36.15 KG/M2 | WEIGHT: 217 LBS

## 2024-03-05 DIAGNOSIS — K80.20 CALCULUS OF GALLBLADDER WITHOUT CHOLECYSTITIS WITHOUT OBSTRUCTION: Primary | ICD-10-CM

## 2024-03-05 PROCEDURE — 1159F MED LIST DOCD IN RCRD: CPT | Mod: CPTII,,, | Performed by: SURGERY

## 2024-03-05 PROCEDURE — 4010F ACE/ARB THERAPY RXD/TAKEN: CPT | Mod: CPTII,,, | Performed by: SURGERY

## 2024-03-05 PROCEDURE — 3008F BODY MASS INDEX DOCD: CPT | Mod: CPTII,,, | Performed by: SURGERY

## 2024-03-05 PROCEDURE — 99203 OFFICE O/P NEW LOW 30 MIN: CPT | Mod: S$PBB,,, | Performed by: SURGERY

## 2024-03-05 PROCEDURE — 99215 OFFICE O/P EST HI 40 MIN: CPT | Mod: PBBFAC | Performed by: SURGERY

## 2024-03-05 RX ORDER — SODIUM CHLORIDE 9 MG/ML
INJECTION, SOLUTION INTRAVENOUS CONTINUOUS
Status: CANCELLED | OUTPATIENT
Start: 2024-03-05

## 2024-03-05 RX ORDER — CEFAZOLIN SODIUM 2 G/50ML
2 SOLUTION INTRAVENOUS
Status: CANCELLED | OUTPATIENT
Start: 2024-03-05

## 2024-03-05 NOTE — H&P (VIEW-ONLY)
General Surgery History and Physical      Patient ID: Kirti Morris is a 53 y.o. female.    Chief Complaint: Gall Bladder Problem      HPI:  Patient is a 53-year-old female with multiple abdominal complaints.  She states for a while now couple of months she has had some intermittent abdominal pain diffuse sometimes in the upper part sometimes lower part.  She states she has been getting worse recently terms of the fact that after she eats she is some belching discomfort and bloating and she has not been able to eat very well.  She is seen multiple GI doctors in the past.  She does have a history of diverticulitis recent colonoscopy and EGD.  Recent workup also showed cholelithiasis on ultrasound.  She has had multiple abdominal operations as well including a total hysterectomy and exploratory surgery.    Review of Systems   Constitutional:  Negative for activity change, appetite change, fatigue and fever.   HENT:  Negative for trouble swallowing.    Respiratory:  Negative for cough and shortness of breath.    Cardiovascular:  Negative for chest pain and palpitations.   Gastrointestinal:  Positive for abdominal distention, abdominal pain and nausea. Negative for blood in stool, constipation and diarrhea.   Genitourinary:  Negative for flank pain.   Musculoskeletal:  Negative for neck pain and neck stiffness.   Neurological:  Negative for weakness.       Current Outpatient Medications   Medication Sig Dispense Refill    albuterol (PROVENTIL) 2.5 mg /3 mL (0.083 %) nebulizer solution Take 3 mLs (2.5 mg total) by nebulization every 6 (six) hours as needed for Wheezing. Rescue 120 each 1    albuterol (PROVENTIL/VENTOLIN HFA) 90 mcg/actuation inhaler Inhale 2 puffs into the lungs every 6 (six) hours as needed for Wheezing. INHALE TWO PUFFS BY MOUTH TWICE DAILY FOR SHORTNESS OF BREATH / not more THAN FOUR TIMES DAILY 18 g 11     amLODIPine (NORVASC) 10 MG tablet Take 1 tablet (10 mg total) by mouth once daily. 30 tablet 5    ascorbic acid, vitamin C, (VITAMIN C) 500 MG tablet 500 mg.      aspirin (ECOTRIN) 81 MG EC tablet Take 81 mg by mouth once daily.      budesonide-formoterol 160-4.5 mcg (SYMBICORT) 160-4.5 mcg/actuation HFAA Inhale 2 puffs into the lungs every 12 (twelve) hours. Controller 10.2 g 5    cetirizine (ZYRTEC) 10 MG tablet Take 1 tablet (10 mg total) by mouth once daily. 30 tablet 5    cholecalciferol, vitamin D3, (VITAMIN D3) 250 mcg (10,000 unit) Cap TAKE ONE CAPSULE BY MOUTH ONE TIME DAILY 90 capsule 0    ciprofloxacin HCl (CIPRO) 500 MG tablet Take 1 tablet (500 mg total) by mouth every 12 (twelve) hours. 20 tablet 0    clopidogreL (PLAVIX) 75 mg tablet Take 1 tablet (75 mg total) by mouth once daily. 30 tablet 5    DULoxetine (CYMBALTA) 60 MG capsule Take 1 capsule (60 mg total) by mouth once daily. 30 capsule 5    esomeprazole (NEXIUM) 40 MG capsule Take 40 mg by mouth before breakfast.      ezetimibe (ZETIA) 10 mg tablet Take 10 mg by mouth once daily.      famotidine (PEPCID) 20 MG tablet Take 1 tablet (20 mg total) by mouth 2 (two) times daily. 60 tablet 5    fluticasone propionate (FLONASE) 50 mcg/actuation nasal spray 1 spray (50 mcg total) by Each Nostril route once daily. 15 g 2    gabapentin (NEURONTIN) 600 MG tablet Take 1 tablet (600 mg total) by mouth 3 (three) times daily. 90 tablet 5    HYDROcodone-acetaminophen (NORCO) 7.5-325 mg per tablet Take 1 tablet by mouth 2 (two) times daily as needed.      hydrOXYzine pamoate (VISTARIL) 25 MG Cap Take 1 capsule (25 mg total) by mouth every 8 (eight) hours as needed (anxiety). 30 capsule 5    losartan (COZAAR) 50 MG tablet Take 1 tablet (50 mg total) by mouth once daily. 30 tablet 5    metroNIDAZOLE (FLAGYL) 500 MG tablet Take 1 tablet (500 mg total) by mouth every 12 (twelve) hours. 20 tablet 0    multivitamin/iron/folic acid (CENTRUM COMPLETE ORAL) Take by mouth  Daily.      mv-mn/FA/bl coh/isoflav/jujube (ESTROVEN MENOPAUSE ORAL) Take by mouth once daily.      nitroGLYCERIN (NITROSTAT) 0.4 MG SL tablet Place 1 tablet (0.4 mg total) under the tongue every 5 (five) minutes as needed for Chest pain. (Patient not taking: Reported on 2/26/2024) 25 tablet 1    nystatin (MYCOSTATIN) 100,000 unit/mL suspension Take 4 mLs (400,000 Units total) by mouth 4 (four) times daily. for 10 days 160 mL 0    REPATHA SURECLICK 140 mg/mL PnIj Inject 140 mg into the skin every 14 (fourteen) days.      tiZANidine (ZANAFLEX) 4 MG tablet Take 1 tablet (4 mg total) by mouth every 6 (six) hours as needed (muscle strain). 30 tablet 1    zinc gluconate 50 mg tablet Take 50 mg by mouth once daily. Takes as needed       No current facility-administered medications for this visit.       Review of patient's allergies indicates:   Allergen Reactions    Atorvastatin      LIPITOR    Dog dander      Dog hair & epithelia    House dust mite      D. Ptery, D. 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    COPD (chronic obstructive pulmonary disease)     Coronary artery disease     acute MI 02/2014    CRF (chronic renal failure)     GERD (gastroesophageal reflux disease)     Hyperlipidemia     Hypertension     Myocardial infarction     Nonrheumatic mitral (valve) insufficiency 05/21/2018    Sleep apnea 01/06/2020    Stroke     Vitamin D deficiency 07/31/2020       Past Surgical History:   Procedure Laterality Date    CORONARY ANGIOPLASTY WITH STENT PLACEMENT  02/07/2014    HYSTERECTOMY      RADIOFREQUENCY ABLATION Right 10/13/2021    Procedure: RADIOFREQUENCY ABLATION;  Surgeon: David Combs MD;   Location: [facility];  Service: Pain Management;  Laterality: Right;  Right SI RFTC       Family History   Problem Relation Age of Onset    Ovarian cancer Maternal Aunt     Arthritis Mother        Social History     Socioeconomic History    Marital status:    Tobacco Use    Smoking status: Every Day     Current packs/day: 1.00     Average packs/day: 1 pack/day for 38.2 years (38.2 ttl pk-yrs)     Types: Cigarettes     Start date: 1986     Passive exposure: Current    Smokeless tobacco: Never    Tobacco comments:     Smoker since age 16. Never had a CT of chest   Substance and Sexual Activity    Alcohol use: Not Currently     Comment: social    Drug use: Never    Sexual activity: Not Currently       There were no vitals filed for this visit.    Physical Exam  Constitutional:       General: She is not in acute distress.  HENT:      Head: Normocephalic.   Cardiovascular:      Rate and Rhythm: Normal rate and regular rhythm.      Pulses: Normal pulses.   Pulmonary:      Effort: Pulmonary effort is normal. No respiratory distress.      Breath sounds: Normal breath sounds.   Abdominal:      General: Abdomen is flat. There is distension.      Palpations: Abdomen is soft.      Tenderness: There is no abdominal tenderness.   Musculoskeletal:         General: Normal range of motion.   Skin:     General: Skin is warm.   Neurological:      General: No focal deficit present.      Mental Status: She is oriented to person, place, and time.     Details    Reading Physician Reading Date Result Priority   Terence Cook MD  [phone] 2/29/2024 Routine     Narrative & Impression  EXAMINATION:  US ABDOMEN COMPLETE     CLINICAL HISTORY:  hx of diverticulosis and lower abd pain; Lower abdominal pain, unspecified     TECHNIQUE:  Complete abdominal ultrasound (including pancreas, aorta, liver, gallbladder, common bile duct, IVC, kidneys, and spleen) was performed.  Real-time ultrasound images captured and stored.      COMPARISON:  Ultrasound 06/13/2019.  CT 01/17/2020 and 01/26/2022     FINDINGS:  Pancreas: The visualized portions of pancreas appear normal.     Aorta: No aneurysm.     Liver: 13.9 cm, normal in size. Homogeneous parenchymal echotexture. No focal lesions.     Gallbladder: Several gallstones are seen within the gallbladder.  No gallbladder wall thickening or pericholecystic fluid.     Biliary system: 11 mm common bile duct.  No intrahepatic ductal dilatation.     Inferior vena cava: Normal in appearance.     Right kidney: 5.9 cm. No hydronephrosis.  There is atrophy of the superior right kidney with a similar appearing solid area in the lower pole unchanged since 2020.     Left kidney: 10.2 cm. No hydronephrosis.  Simple cyst lower pole.     Spleen: 8.2 cm.  Normal in size with homogeneous echotexture.     Miscellaneous: No ascites.     Impression:     Cholelithiasis without evidence to suggest cholecystitis.        Electronically signed by: Terence Cook  Date:                                            02/29/2024  Time:                                           10:00    Assessment & Plan:    Calculus of gallbladder without cholecystitis without obstruction  -     Ambulatory referral/consult to General Surgery        Patient to go to the OR for laparoscopic cholecystectomy with lysis of adhesions.  Risks and benefits explained to the patient clear risk of bleeding, infection, open operation, injury to surrounding organs, she understands the risk of continued abdominal pain after the operation.  She understands that this gallbladder surgery and lysis of adhesions hopefully help with some of her issues however there is a chance she will have continued complaints.  All questions were answered.

## 2024-03-05 NOTE — PATIENT INSTRUCTIONS
[facility]      Your surgery is scheduled for Friday March 22 nd 2024 at [facility] on the ground floor of the Ambulatory building. You should arrive at 8 am at the Ambulatory Care Center located at [address].                                                                                                                                                                                                                                                                                                                                                           Preoperative Instructions        Your pre-op lab work will be on NONE on the 1st floor of the [facility] building.  EKG is located on 2nd floor of [facility].                                                                                                                                             Day of Surgery Instructions      Bring a list of all your medications with you the day of your surgery. You can also give the list to your doctor or nurse during your final clinic appointment before surgery.      Do not eat any solid foods or drink any liquids after 12:00 AM (midnight). This includes gum, hard candy, mints, and chewing tobacco.  Medications: Take any medications specified with a small sip of water the morning of your surgery.  Brush your teeth: You may brush your teeth and rinse your mouth. Do not swallow any water or toothpaste.  Clothing: A button front shirt and loose-fitting clothes are the most comfortable before and after surgery. We also recommend low-heeled shoes.  Hair: Avoid buns, ponytails, or hairpieces at the back of the head. Remove or avoid any clips, pins or bands that bind hair. Do not use hairspray. Before going to surgery, you will need to remove any wigs or hairpieces.  We will cover your hair during surgery. Your privacy regarding personal appearance will be respected.  Fingernails:  Please be sure to remove all nail polish before you arrive for surgery. We understand that tips, wraps, gels, etc., are expensive; however, we ask these products to be removed from at least one finger on each hand. Your fingertips are used to accurately monitor your oxygen level during surgery by a device called an oximeter.  Glasses and Contact Lenses: Wear glasses when possible. If contact lenses must be worn, bring a lens case and solution. If glasses are worn, bring a case for them.  Hearing Aids: If you rely on a hearing aid, wear it to the hospital on the day of surgery. This will ensure you can hear and understand everything we need to communicate with you.  Valuables: Jewelry, including body piercings, Dentures, money, and credit cards should be left at home. [facility] is not responsible for valuables that are not secured in our surgery center.  Makeup, Perfume, Creams, Lotions and Deodorants: Do not use any of these products on the day of surgery. Remove false eyelashes prior to surgery.  Implanted Medical Devices: If you have an implanted device, such as a pacemaker or AICD, bring the device information card (if you have it) with you.  Medical Equipment: If you have been fitted for a brace to wear after surgery or you have been given crutches, bring those with you to the surgery center.  Shower: Take a shower with Hibiclens® (chlorhexidine) (available over the counter). This reduces the chance of infection. PLEASE USE CHLORHEXIDINE WASH THE NIGHT BEFORE SURGERY AND THE MORNING OF SURGERY.      Medication instructions:  You may take blood pressure medication with a small drink of water the morning of surgery.    Stop taking blood thinners 7  days before surgery.    IF YOU ARE ON ANY OF THESE BLOOD THINNERS, MAKE SURE YOUR PHYSICIAN IS AWARE.  Eliquis/Apixaban            Wafarin/Coumadin,Jantoven  Xarelto/Rivaroxaban      Pletal/Cilostazol  Plavix/Clopidogrel          Pradaxa/Dibigatran      If you are  diabetic      Follow the diabetic medicine instructions you received during your pre-operative visit.  DO NOT take your insulin or diabetic medications the morning of surgery.  When you arrive at the surgical center, be sure to tell the nurse you are diabetic.    The following blood sugar medications have to be stopped prior to surgery:    Hold 24 hours prior to surgery:    Libraglutide - Saxenda, Victoza  Lixisenatide --Adlxyin  Exenatide  --  Byetta  Empaglifozin--Jardiance  Sitaglitin--Januvia    Hold 1 week prior to surgery:    Semaglutide - Ozempic, Wegouy, Rybelsus  Dulaglutide - Trulicity  Tirzepatide - Mounjaro  Exenatide (extended release inj)-- Bydureon BCise      Hold 48 hours prior to surgery:    Metformin, Glucovance, Metaglip, Fortamet, Glucophage, Riomet, Avandamet, Glimepiride            Other Items to bring with you and know      Insurance card  Identification card such as 's license, passport, or other picture ID  Copy of your advance directives  List of medications and allergies, if not already provided  Name and phone number of person to contact if your condition changes significantly. YOU CANNOT DRIVE YOURSELF HOME FROM THE HOSPITAL THE DAY OF SURGERY.  PLEASE UNDERSTAND THAT OUR OFFICE DOES NOT GIVE PATHOLOGY RESULTS OR TEST RESULTS OVER THE PHONE. THIS WILL BE DISCUSSED WITH YOU ON YOUR FOLLOW UP APPOINTMENT.          Alcohol and Surgery  We want to help you prepare for and recover from surgery as quickly and safely as possible. Be open and honest with your provider about how many drinks you have per day. Excessive alcohol use is defined as drinking more than three drinks per day. It can affect the outcome of your surgery. Binge drinking (consuming large amounts of alcohol infrequently, such as on weekends) can also affect the outcome of your surgery.  Alcohol withdrawal  If you drink more than three drinks a day, you could have a complication, called alcohol withdrawal, after  surgery.  Alcohol withdrawal is a set of symptoms that people have when they suddenly stop drinking after using alcohol  for a long time. During withdrawal, a person's central nervous system overreacts. This can cause mild symptoms such as shakiness, sweating or hallucinating. It can also cause other more serious side effects. If not treated properly, alcohol withdrawal can cause potentially life-threatening complications after surgery. This can include tremors, seizures, hallucinations, delirium tremors, and even death. Untreated alcohol withdrawal often leads to a longer stay in the hospital, potentially in the Intensive Care Unit.  Chronic heavy drinking also can interfere with several organ systems and biochemical processes in the body.  This interference can cause serious, even life-threatening complications.  Your care team can offer alcohol withdrawal treatment to help:  Decrease the risk of seizures and delirium tremors after surgery  Decrease the risk we will need to restrain you for your own safety or the safety of others  Decrease your risk of falling after surgery  Reduce the use of potent sedative medications  Reduce the time you stay in the hospital after surgery  Reduce the time you might spend on a mechanical ventilator to help you breathe  Lower incidence of organ failure and biochemical complications  Talk to a member of your care team or your primary care physician about your alcohol use if you feel you may be at risk of any of these complications.        Smoking and Surgery  Quitting smoking is extremely important for a successful surgery and recovery. Cigarette smoking compromises your immune system. This increases your risk of an infection after surgery. Quitting the habit before surgery will decrease the surgical risks associated with smoking.

## 2024-03-05 NOTE — PROGRESS NOTES
General Surgery History and Physical      Patient ID: Kirti Morris is a 53 y.o. female.    Chief Complaint: Gall Bladder Problem      HPI:  Patient is a 53-year-old female with multiple abdominal complaints.  She states for a while now couple of months she has had some intermittent abdominal pain diffuse sometimes in the upper part sometimes lower part.  She states she has been getting worse recently terms of the fact that after she eats she is some belching discomfort and bloating and she has not been able to eat very well.  She is seen multiple GI doctors in the past.  She does have a history of diverticulitis recent colonoscopy and EGD.  Recent workup also showed cholelithiasis on ultrasound.  She has had multiple abdominal operations as well including a total hysterectomy and exploratory surgery.    Review of Systems   Constitutional:  Negative for activity change, appetite change, fatigue and fever.   HENT:  Negative for trouble swallowing.    Respiratory:  Negative for cough and shortness of breath.    Cardiovascular:  Negative for chest pain and palpitations.   Gastrointestinal:  Positive for abdominal distention, abdominal pain and nausea. Negative for blood in stool, constipation and diarrhea.   Genitourinary:  Negative for flank pain.   Musculoskeletal:  Negative for neck pain and neck stiffness.   Neurological:  Negative for weakness.       Current Outpatient Medications   Medication Sig Dispense Refill    albuterol (PROVENTIL) 2.5 mg /3 mL (0.083 %) nebulizer solution Take 3 mLs (2.5 mg total) by nebulization every 6 (six) hours as needed for Wheezing. Rescue 120 each 1    albuterol (PROVENTIL/VENTOLIN HFA) 90 mcg/actuation inhaler Inhale 2 puffs into the lungs every 6 (six) hours as needed for Wheezing. INHALE TWO PUFFS BY MOUTH TWICE DAILY FOR SHORTNESS OF BREATH / not more THAN FOUR TIMES DAILY 18 g 11     amLODIPine (NORVASC) 10 MG tablet Take 1 tablet (10 mg total) by mouth once daily. 30 tablet 5    ascorbic acid, vitamin C, (VITAMIN C) 500 MG tablet 500 mg.      aspirin (ECOTRIN) 81 MG EC tablet Take 81 mg by mouth once daily.      budesonide-formoterol 160-4.5 mcg (SYMBICORT) 160-4.5 mcg/actuation HFAA Inhale 2 puffs into the lungs every 12 (twelve) hours. Controller 10.2 g 5    cetirizine (ZYRTEC) 10 MG tablet Take 1 tablet (10 mg total) by mouth once daily. 30 tablet 5    cholecalciferol, vitamin D3, (VITAMIN D3) 250 mcg (10,000 unit) Cap TAKE ONE CAPSULE BY MOUTH ONE TIME DAILY 90 capsule 0    ciprofloxacin HCl (CIPRO) 500 MG tablet Take 1 tablet (500 mg total) by mouth every 12 (twelve) hours. 20 tablet 0    clopidogreL (PLAVIX) 75 mg tablet Take 1 tablet (75 mg total) by mouth once daily. 30 tablet 5    DULoxetine (CYMBALTA) 60 MG capsule Take 1 capsule (60 mg total) by mouth once daily. 30 capsule 5    esomeprazole (NEXIUM) 40 MG capsule Take 40 mg by mouth before breakfast.      ezetimibe (ZETIA) 10 mg tablet Take 10 mg by mouth once daily.      famotidine (PEPCID) 20 MG tablet Take 1 tablet (20 mg total) by mouth 2 (two) times daily. 60 tablet 5    fluticasone propionate (FLONASE) 50 mcg/actuation nasal spray 1 spray (50 mcg total) by Each Nostril route once daily. 15 g 2    gabapentin (NEURONTIN) 600 MG tablet Take 1 tablet (600 mg total) by mouth 3 (three) times daily. 90 tablet 5    HYDROcodone-acetaminophen (NORCO) 7.5-325 mg per tablet Take 1 tablet by mouth 2 (two) times daily as needed.      hydrOXYzine pamoate (VISTARIL) 25 MG Cap Take 1 capsule (25 mg total) by mouth every 8 (eight) hours as needed (anxiety). 30 capsule 5    losartan (COZAAR) 50 MG tablet Take 1 tablet (50 mg total) by mouth once daily. 30 tablet 5    metroNIDAZOLE (FLAGYL) 500 MG tablet Take 1 tablet (500 mg total) by mouth every 12 (twelve) hours. 20 tablet 0    multivitamin/iron/folic acid (CENTRUM COMPLETE ORAL) Take by mouth  Daily.      mv-mn/FA/bl coh/isoflav/jujube (ESTROVEN MENOPAUSE ORAL) Take by mouth once daily.      nitroGLYCERIN (NITROSTAT) 0.4 MG SL tablet Place 1 tablet (0.4 mg total) under the tongue every 5 (five) minutes as needed for Chest pain. (Patient not taking: Reported on 2/26/2024) 25 tablet 1    nystatin (MYCOSTATIN) 100,000 unit/mL suspension Take 4 mLs (400,000 Units total) by mouth 4 (four) times daily. for 10 days 160 mL 0    REPATHA SURECLICK 140 mg/mL PnIj Inject 140 mg into the skin every 14 (fourteen) days.      tiZANidine (ZANAFLEX) 4 MG tablet Take 1 tablet (4 mg total) by mouth every 6 (six) hours as needed (muscle strain). 30 tablet 1    zinc gluconate 50 mg tablet Take 50 mg by mouth once daily. Takes as needed       No current facility-administered medications for this visit.       Review of patient's allergies indicates:   Allergen Reactions    Atorvastatin      LIPITOR    Dog dander      Dog hair & epithelia    House dust mite      D. Ptery, D. 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    COPD (chronic obstructive pulmonary disease)     Coronary artery disease     acute MI 02/2014    CRF (chronic renal failure)     GERD (gastroesophageal reflux disease)     Hyperlipidemia     Hypertension     Myocardial infarction     Nonrheumatic mitral (valve) insufficiency 05/21/2018    Sleep apnea 01/06/2020    Stroke     Vitamin D deficiency 07/31/2020       Past Surgical History:   Procedure Laterality Date    CORONARY ANGIOPLASTY WITH STENT PLACEMENT  02/07/2014    HYSTERECTOMY      RADIOFREQUENCY ABLATION Right 10/13/2021    Procedure: RADIOFREQUENCY ABLATION;  Surgeon: David Combs MD;   Location: On license of [facility];  Service: Pain Management;  Laterality: Right;  Right SI RFTC       Family History   Problem Relation Age of Onset    Ovarian cancer Maternal Aunt     Arthritis Mother        Social History     Socioeconomic History    Marital status:    Tobacco Use    Smoking status: Every Day     Current packs/day: 1.00     Average packs/day: 1 pack/day for 38.2 years (38.2 ttl pk-yrs)     Types: Cigarettes     Start date: 1986     Passive exposure: Current    Smokeless tobacco: Never    Tobacco comments:     Smoker since age 16. Never had a CT of chest   Substance and Sexual Activity    Alcohol use: Not Currently     Comment: social    Drug use: Never    Sexual activity: Not Currently       There were no vitals filed for this visit.    Physical Exam  Constitutional:       General: She is not in acute distress.  HENT:      Head: Normocephalic.   Cardiovascular:      Rate and Rhythm: Normal rate and regular rhythm.      Pulses: Normal pulses.   Pulmonary:      Effort: Pulmonary effort is normal. No respiratory distress.      Breath sounds: Normal breath sounds.   Abdominal:      General: Abdomen is flat. There is distension.      Palpations: Abdomen is soft.      Tenderness: There is no abdominal tenderness.   Musculoskeletal:         General: Normal range of motion.   Skin:     General: Skin is warm.   Neurological:      General: No focal deficit present.      Mental Status: She is oriented to person, place, and time.     Details    Reading Physician Reading Date Result Priority   Terence Cook MD  [phone] 2/29/2024 Routine     Narrative & Impression  EXAMINATION:  US ABDOMEN COMPLETE     CLINICAL HISTORY:  hx of diverticulosis and lower abd pain; Lower abdominal pain, unspecified     TECHNIQUE:  Complete abdominal ultrasound (including pancreas, aorta, liver, gallbladder, common bile duct, IVC, kidneys, and spleen) was performed.  Real-time ultrasound images captured and stored.      COMPARISON:  Ultrasound 06/13/2019.  CT 01/17/2020 and 01/26/2022     FINDINGS:  Pancreas: The visualized portions of pancreas appear normal.     Aorta: No aneurysm.     Liver: 13.9 cm, normal in size. Homogeneous parenchymal echotexture. No focal lesions.     Gallbladder: Several gallstones are seen within the gallbladder.  No gallbladder wall thickening or pericholecystic fluid.     Biliary system: 11 mm common bile duct.  No intrahepatic ductal dilatation.     Inferior vena cava: Normal in appearance.     Right kidney: 5.9 cm. No hydronephrosis.  There is atrophy of the superior right kidney with a similar appearing solid area in the lower pole unchanged since 2020.     Left kidney: 10.2 cm. No hydronephrosis.  Simple cyst lower pole.     Spleen: 8.2 cm.  Normal in size with homogeneous echotexture.     Miscellaneous: No ascites.     Impression:     Cholelithiasis without evidence to suggest cholecystitis.        Electronically signed by: Terence Cook  Date:                                            02/29/2024  Time:                                           10:00    Assessment & Plan:    Calculus of gallbladder without cholecystitis without obstruction  -     Ambulatory referral/consult to General Surgery        Patient to go to the OR for laparoscopic cholecystectomy with lysis of adhesions.  Risks and benefits explained to the patient clear risk of bleeding, infection, open operation, injury to surrounding organs, she understands the risk of continued abdominal pain after the operation.  She understands that this gallbladder surgery and lysis of adhesions hopefully help with some of her issues however there is a chance she will have continued complaints.  All questions were answered.

## 2024-03-22 ENCOUNTER — HOSPITAL ENCOUNTER (OUTPATIENT)
Facility: HOSPITAL | Age: 54
Discharge: HOME | End: 2024-03-22
Attending: SURGERY | Admitting: SURGERY
Payer: COMMERCIAL

## 2024-03-22 ENCOUNTER — ANESTHESIA (OUTPATIENT)
Dept: SURGERY | Facility: HOSPITAL | Age: 54
End: 2024-03-22
Payer: COMMERCIAL

## 2024-03-22 ENCOUNTER — ANESTHESIA EVENT (OUTPATIENT)
Dept: SURGERY | Facility: HOSPITAL | Age: 54
End: 2024-03-22
Payer: COMMERCIAL

## 2024-03-22 VITALS
SYSTOLIC BLOOD PRESSURE: 117 MMHG | HEIGHT: 66 IN | WEIGHT: 225 LBS | TEMPERATURE: 97 F | HEART RATE: 64 BPM | RESPIRATION RATE: 14 BRPM | DIASTOLIC BLOOD PRESSURE: 66 MMHG | OXYGEN SATURATION: 96 % | BODY MASS INDEX: 36.16 KG/M2

## 2024-03-22 DIAGNOSIS — K80.20 CALCULUS OF GALLBLADDER WITHOUT CHOLECYSTITIS WITHOUT OBSTRUCTION: Primary | ICD-10-CM

## 2024-03-22 PROCEDURE — 71000033 HC RECOVERY, INTIAL HOUR: Performed by: SURGERY

## 2024-03-22 PROCEDURE — 27202344 HC EYESHIELD: Performed by: ANESTHESIOLOGY

## 2024-03-22 PROCEDURE — 63600175 PHARM REV CODE 636 W HCPCS: Performed by: ANESTHESIOLOGY

## 2024-03-22 PROCEDURE — 88304 TISSUE EXAM BY PATHOLOGIST: CPT | Mod: TC,SUR | Performed by: SURGERY

## 2024-03-22 PROCEDURE — 47562 LAPAROSCOPIC CHOLECYSTECTOMY: CPT | Mod: ,,, | Performed by: SURGERY

## 2024-03-22 PROCEDURE — 88304 TISSUE EXAM BY PATHOLOGIST: CPT | Mod: 26,,, | Performed by: PATHOLOGY

## 2024-03-22 PROCEDURE — 71000016 HC POSTOP RECOV ADDL HR: Performed by: SURGERY

## 2024-03-22 PROCEDURE — 25000003 PHARM REV CODE 250: Performed by: SURGERY

## 2024-03-22 PROCEDURE — 27000509 HC TUBE SALEM SUMP ANY SIZE: Performed by: ANESTHESIOLOGY

## 2024-03-22 PROCEDURE — 27000655: Performed by: ANESTHESIOLOGY

## 2024-03-22 PROCEDURE — D9220A PRA ANESTHESIA: Mod: CRNA,,, | Performed by: NURSE ANESTHETIST, CERTIFIED REGISTERED

## 2024-03-22 PROCEDURE — 71000015 HC POSTOP RECOV 1ST HR: Performed by: SURGERY

## 2024-03-22 PROCEDURE — 27000689 HC BLADE LARYNGOSCOPE ANY SIZE: Performed by: ANESTHESIOLOGY

## 2024-03-22 PROCEDURE — 27000510 HC BLANKET BAIR HUGGER ANY SIZE: Performed by: ANESTHESIOLOGY

## 2024-03-22 PROCEDURE — D9220A PRA ANESTHESIA: Mod: ANES,,, | Performed by: ANESTHESIOLOGY

## 2024-03-22 PROCEDURE — 27201423 OPTIME MED/SURG SUP & DEVICES STERILE SUPPLY: Performed by: SURGERY

## 2024-03-22 PROCEDURE — 36000709 HC OR TIME LEV III EA ADD 15 MIN: Performed by: SURGERY

## 2024-03-22 PROCEDURE — 37000009 HC ANESTHESIA EA ADD 15 MINS: Performed by: SURGERY

## 2024-03-22 PROCEDURE — 37000008 HC ANESTHESIA 1ST 15 MINUTES: Performed by: SURGERY

## 2024-03-22 PROCEDURE — 36000708 HC OR TIME LEV III 1ST 15 MIN: Performed by: SURGERY

## 2024-03-22 PROCEDURE — 27000716 HC OXISENSOR PROBE, ANY SIZE: Performed by: ANESTHESIOLOGY

## 2024-03-22 PROCEDURE — 25000003 PHARM REV CODE 250: Performed by: NURSE ANESTHETIST, CERTIFIED REGISTERED

## 2024-03-22 PROCEDURE — 27000165 HC TUBE, ETT CUFFED: Performed by: ANESTHESIOLOGY

## 2024-03-22 PROCEDURE — 63600175 PHARM REV CODE 636 W HCPCS: Performed by: NURSE ANESTHETIST, CERTIFIED REGISTERED

## 2024-03-22 RX ORDER — PROPOFOL 10 MG/ML
VIAL (ML) INTRAVENOUS
Status: DISCONTINUED | OUTPATIENT
Start: 2024-03-22 | End: 2024-03-22

## 2024-03-22 RX ORDER — ACETAMINOPHEN 10 MG/ML
1000 INJECTION, SOLUTION INTRAVENOUS ONCE
Status: COMPLETED | OUTPATIENT
Start: 2024-03-22 | End: 2024-03-22

## 2024-03-22 RX ORDER — ONDANSETRON HYDROCHLORIDE 2 MG/ML
4 INJECTION, SOLUTION INTRAVENOUS DAILY PRN
Status: DISCONTINUED | OUTPATIENT
Start: 2024-03-22 | End: 2024-03-22 | Stop reason: HOSPADM

## 2024-03-22 RX ORDER — LIDOCAINE HYDROCHLORIDE 20 MG/ML
INJECTION, SOLUTION EPIDURAL; INFILTRATION; INTRACAUDAL; PERINEURAL
Status: DISCONTINUED | OUTPATIENT
Start: 2024-03-22 | End: 2024-03-22

## 2024-03-22 RX ORDER — ONDANSETRON HYDROCHLORIDE 2 MG/ML
INJECTION, SOLUTION INTRAVENOUS
Status: DISCONTINUED | OUTPATIENT
Start: 2024-03-22 | End: 2024-03-22

## 2024-03-22 RX ORDER — CEFAZOLIN SODIUM 1 G/3ML
INJECTION, POWDER, FOR SOLUTION INTRAMUSCULAR; INTRAVENOUS
Status: DISCONTINUED | OUTPATIENT
Start: 2024-03-22 | End: 2024-03-22

## 2024-03-22 RX ORDER — SODIUM CHLORIDE 9 MG/ML
INJECTION, SOLUTION INTRAVENOUS CONTINUOUS
Status: DISCONTINUED | OUTPATIENT
Start: 2024-03-22 | End: 2024-03-22 | Stop reason: HOSPADM

## 2024-03-22 RX ORDER — DIPHENHYDRAMINE HYDROCHLORIDE 50 MG/ML
25 INJECTION INTRAMUSCULAR; INTRAVENOUS EVERY 6 HOURS PRN
Status: DISCONTINUED | OUTPATIENT
Start: 2024-03-22 | End: 2024-03-22 | Stop reason: HOSPADM

## 2024-03-22 RX ORDER — MORPHINE SULFATE 10 MG/ML
4 INJECTION INTRAMUSCULAR; INTRAVENOUS; SUBCUTANEOUS EVERY 5 MIN PRN
Status: DISCONTINUED | OUTPATIENT
Start: 2024-03-22 | End: 2024-03-22 | Stop reason: HOSPADM

## 2024-03-22 RX ORDER — HYDROMORPHONE HYDROCHLORIDE 2 MG/ML
0.5 INJECTION, SOLUTION INTRAMUSCULAR; INTRAVENOUS; SUBCUTANEOUS EVERY 5 MIN PRN
Status: DISCONTINUED | OUTPATIENT
Start: 2024-03-22 | End: 2024-03-22 | Stop reason: HOSPADM

## 2024-03-22 RX ORDER — ROCURONIUM BROMIDE 10 MG/ML
INJECTION, SOLUTION INTRAVENOUS
Status: DISCONTINUED | OUTPATIENT
Start: 2024-03-22 | End: 2024-03-22

## 2024-03-22 RX ORDER — MEPERIDINE HYDROCHLORIDE 25 MG/ML
25 INJECTION INTRAMUSCULAR; INTRAVENOUS; SUBCUTANEOUS EVERY 10 MIN PRN
Status: DISCONTINUED | OUTPATIENT
Start: 2024-03-22 | End: 2024-03-22 | Stop reason: HOSPADM

## 2024-03-22 RX ORDER — FENTANYL CITRATE 50 UG/ML
INJECTION, SOLUTION INTRAMUSCULAR; INTRAVENOUS
Status: DISCONTINUED | OUTPATIENT
Start: 2024-03-22 | End: 2024-03-22

## 2024-03-22 RX ORDER — DIMENHYDRINATE 50 MG
50 TABLET ORAL ONCE
Status: DISCONTINUED | OUTPATIENT
Start: 2024-03-22 | End: 2024-03-22 | Stop reason: HOSPADM

## 2024-03-22 RX ORDER — HYDROCODONE BITARTRATE AND ACETAMINOPHEN 7.5; 325 MG/1; MG/1
1 TABLET ORAL EVERY 6 HOURS PRN
Qty: 15 TABLET | Refills: 0 | Status: SHIPPED | OUTPATIENT
Start: 2024-03-22

## 2024-03-22 RX ORDER — PHENYLEPHRINE HYDROCHLORIDE 10 MG/ML
INJECTION INTRAVENOUS
Status: DISCONTINUED | OUTPATIENT
Start: 2024-03-22 | End: 2024-03-22

## 2024-03-22 RX ORDER — SODIUM CHLORIDE 9 MG/ML
INJECTION, SOLUTION INTRAVENOUS CONTINUOUS PRN
Status: DISCONTINUED | OUTPATIENT
Start: 2024-03-22 | End: 2024-03-22

## 2024-03-22 RX ORDER — DEXAMETHASONE SODIUM PHOSPHATE 4 MG/ML
INJECTION, SOLUTION INTRA-ARTICULAR; INTRALESIONAL; INTRAMUSCULAR; INTRAVENOUS; SOFT TISSUE
Status: DISCONTINUED | OUTPATIENT
Start: 2024-03-22 | End: 2024-03-22

## 2024-03-22 RX ADMIN — DEXAMETHASONE SODIUM PHOSPHATE 8 MG: 4 INJECTION, SOLUTION INTRA-ARTICULAR; INTRALESIONAL; INTRAMUSCULAR; INTRAVENOUS; SOFT TISSUE at 11:03

## 2024-03-22 RX ADMIN — SODIUM CHLORIDE: 9 INJECTION, SOLUTION INTRAVENOUS at 08:03

## 2024-03-22 RX ADMIN — LIDOCAINE HYDROCHLORIDE 50 MG: 20 INJECTION, SOLUTION INTRAVENOUS at 11:03

## 2024-03-22 RX ADMIN — ROCURONIUM BROMIDE 50 MG: 10 INJECTION, SOLUTION INTRAVENOUS at 11:03

## 2024-03-22 RX ADMIN — ONDANSETRON 4 MG: 2 INJECTION INTRAMUSCULAR; INTRAVENOUS at 11:03

## 2024-03-22 RX ADMIN — PROPOFOL 200 MG: 10 INJECTION, EMULSION INTRAVENOUS at 11:03

## 2024-03-22 RX ADMIN — HYDROMORPHONE HYDROCHLORIDE 0.5 MG: 2 INJECTION INTRAMUSCULAR; INTRAVENOUS; SUBCUTANEOUS at 12:03

## 2024-03-22 RX ADMIN — FENTANYL CITRATE 100 MCG: 50 INJECTION INTRAMUSCULAR; INTRAVENOUS at 11:03

## 2024-03-22 RX ADMIN — PHENYLEPHRINE HYDROCHLORIDE 100 MCG: 10 INJECTION INTRAVENOUS at 12:03

## 2024-03-22 RX ADMIN — SUGAMMADEX 200 MG: 100 INJECTION, SOLUTION INTRAVENOUS at 12:03

## 2024-03-22 RX ADMIN — CEFAZOLIN 2000 MG: 1 INJECTION, POWDER, FOR SOLUTION INTRAMUSCULAR; INTRAVENOUS; PARENTERAL at 11:03

## 2024-03-22 RX ADMIN — SODIUM CHLORIDE: 9 INJECTION, SOLUTION INTRAVENOUS at 11:03

## 2024-03-22 RX ADMIN — ACETAMINOPHEN 1000 MG: 10 INJECTION, SOLUTION INTRAVENOUS at 01:03

## 2024-03-22 NOTE — PROGRESS NOTES
1240 RECEIVED TO RR AWAKE, ALERT. FOLLOWS COMMANDS, RACHELLE. HOB ELEVATED. NO RESP. DISTRESS NOTED. O2 VIA NC. HOB ELEVATED. ABDOMEN SOFT WITH ISLAND DRESSINGS X4 D/I. SCD AND OLYA PALMAE IN PROGRESS. I V INFUSING WELL LEFT HAND 22G. CATH. C/O PAIN AT OP-SITE. SEE MAR FOR MEDS GIVEN.     1310 STILL C/O PAIN AFTER IM OF DILAUDID. OFIRMEV ORDERED PER DR. LARES AND GIVEN.    1325 AWAKE, ALERT. DRESSINGS D/I, ABDOMEN SOFT. STATES FEELS BETTER. TRANSFERRED TO ROOM.

## 2024-03-22 NOTE — DISCHARGE SUMMARY
[facility] - Periop Services  Discharge Note  Short Stay    Procedure(s) (LRB):  CHOLECYSTECTOMY, LAPAROSCOPIC (N/A)  LYSIS, ADHESIONS, LAPAROSCOPIC (N/A)      OUTCOME: Patient tolerated treatment/procedure well without complication and is now ready for discharge.    DISPOSITION: Home or Self Care    FINAL DIAGNOSIS: 1) Cholelithiasis 2) Abdominal pain    FOLLOWUP: In clinic    DISCHARGE INSTRUCTIONS:    Discharge Procedure Orders   Diet Adult Regular     Remove dressing in 24 hours     Notify your health care provider if you experience any of the following:  temperature >100.4     Notify your health care provider if you experience any of the following:  persistent nausea and vomiting or diarrhea     Notify your health care provider if you experience any of the following:  severe uncontrolled pain     Notify your health care provider if you experience any of the following:  redness, tenderness, or signs of infection (pain, swelling, redness, odor or green/yellow discharge around incision site)     Notify your health care provider if you experience any of the following:  difficulty breathing or increased cough     Notify your health care provider if you experience any of the following:  severe persistent headache     Notify your health care provider if you experience any of the following:  worsening rash     Notify your health care provider if you experience any of the following:  persistent dizziness, light-headedness, or visual disturbances     Notify your health care provider if you experience any of the following:  increased confusion or weakness     Notify your health care provider if you experience any of the following:     Shower on day dressing removed (No bath)        TIME SPENT ON DISCHARGE: 5 minutes

## 2024-03-22 NOTE — ANESTHESIA PROCEDURE NOTES
Intubation    Date/Time: 3/22/2024 11:50 AM    Performed by: Jayjay Monroy CRNA  Authorized by: David Rodriguez MD    Intubation:     Induction:  Intravenous    Intubated:  Postinduction    Mask Ventilation:  Easy mask    Attempts:  1    Attempted By:  CRNA    Method of Intubation:  Direct    Blade:  Marta 4    Laryngeal View Grade: Grade IIA - cords partially seen      Difficult Airway Encountered?: No      Complications:  None    Airway Device:  Oral endotracheal tube    Airway Device Size:  7.5    Style/Cuff Inflation:  Cuffed (inflated to minimal occlusive pressure)    Tube secured:  21    Secured at:  The lips    Placement Verified By:  Capnometry    Complicating Factors:  None    Findings Post-Intubation:  BS equal bilateral

## 2024-03-22 NOTE — TRANSFER OF CARE
"Anesthesia Transfer of Care Note    Patient: Kirti Morris    Procedure(s) Performed: Procedure(s) (LRB):  CHOLECYSTECTOMY, LAPAROSCOPIC (N/A)  LYSIS, ADHESIONS, LAPAROSCOPIC (N/A)    Patient location: PACU    Anesthesia Type: general    Transport from OR: Transported from OR on room air with adequate spontaneous ventilation    Post pain: adequate analgesia    Post assessment: no apparent anesthetic complications    Post vital signs: stable    Level of consciousness: responds to stimulation    Nausea/Vomiting: no nausea/vomiting    Complications: none    Transfer of care protocol was followed      Last vitals: Visit Vitals  BP 99/63   Pulse 78   Temp 36.1 °C (97 °F)   Resp 15   Ht 5' 5.5" (1.664 m)   Wt 102.1 kg (225 lb)   SpO2 95%   Breastfeeding No   BMI 36.87 kg/m²     "

## 2024-03-22 NOTE — OP NOTE
[facility] - [facility]iop [facility]  Surgery Department  Operative Note    SUMMARY     Date of Procedure: 3/22/2024     Procedure: Procedure(s) (LRB):  CHOLECYSTECTOMY, LAPAROSCOPIC (N/A)  LYSIS, ADHESIONS, LAPAROSCOPIC (N/A)     Surgeon(s) and Role:     * Preston Bender MD - Primary    Assisting Surgeon: None    Pre-Operative Diagnosis: Calculus of gallbladder without cholecystitis without obstruction [K80.20]    Post-Operative Diagnosis: Post-Op Diagnosis Codes:     * Calculus of gallbladder without cholecystitis without obstruction [K80.20]    Anesthesia: General    Procedures Performed: 1) Laparoscopic cholecystectomy 2) Lysis of adhesions    Significant Findings of the Procedure:  mild omental adhesions to the gallbladder consistent with chronic cholecystitis.  Moderate amount of omental adhesions in the lower abdominal area including the midline in the right lower quadrant.  These were carefully lysed.    Procedure in Detail: After informed consent was obtained patient was brought to the OR and prepped and draped in the usual sterile fashion. We started off by optically inserting a 5 mm trocar at the umbilical area. Pneumoperitoneum was obtained up to 15 cm of mercury of pressure. We then under direct visualization placed an additional 11 mm port in the subxiphoid area and 2 additional 5 mm ports in the right upper quadrant. We then retracted the gallbladder over the liver and began dissecting out the cystic structures. The cystic duct and the artery were the only 2 structures entering the gallbladder and these were clipped twice distally once proximally and cut between. We then used the hook cautery to remove the gallbladder from the gallbladder fossa. No holes were made in the gallbladder nor the liver. We then used an Endo Catch bag to remove the gallbladder. We then inspected our bed and clips and they were all intact and hemostatic.  We then looked into the pelvis area and down there she had a  moderate amount of omental adhesions to the anterior abdominal wall.  She did complain of some intermittent lower and right lower quadrant abdominal pain we elected do lyse all these adhesions to hopefully help with the pain.  We lysed all these omental adhesions no bowel adhesions present and afterwards we were hemostatic and intact.  We watched our ports come out.  We then discontinued pneumoperitoneum. The skin was closed with a 4-0 Monocryl in a subcuticular manner and patient tolerated the procedure well.      Complications: No    Estimated Blood Loss (EBL): 10 cc           Implants: * No implants in log *    Specimens:   Specimen (24h ago, onward)       Start     Ordered    03/22/24 1211  Surgical Pathology  RELEASE UPON ORDERING         03/22/24 1211                            Condition: Good    Disposition: PACU - hemodynamically stable.    Attestation: I was present and scrubbed for the entire procedure.

## 2024-03-22 NOTE — ANESTHESIA POSTPROCEDURE EVALUATION
Anesthesia Post Evaluation    Patient: Kirti Morris    Procedure(s) Performed: Procedure(s) (LRB):  CHOLECYSTECTOMY, LAPAROSCOPIC (N/A)  LYSIS, ADHESIONS, LAPAROSCOPIC (N/A)    Final Anesthesia Type: general      Patient location during evaluation: PACU  Post-procedure vital signs: reviewed and stable  Pain management: adequate  Airway patency: patent    PONV status at discharge: No PONV  Anesthetic complications: no      Cardiovascular status: hemodynamically stable  Respiratory status: unassisted  Hydration status: euvolemic  Follow-up not needed.              Vitals Value Taken Time   /66 03/22/24 1401   Temp 36.1 °C (97 °F) 03/22/24 1243   Pulse 53 03/22/24 1401   Resp 0 03/22/24 1326   SpO2 96 % 03/22/24 1400   Vitals shown include unvalidated device data.      Event Time   Out of Recovery 13:25:00         Pain/Chong Score: Pain Rating Prior to Med Admin: 7 (3/22/2024  1:14 PM)  Pain Rating Post Med Admin: 5 (3/22/2024  1:25 PM)  Chong Score: 10 (3/22/2024  1:30 PM)

## 2024-03-22 NOTE — ANESTHESIA PREPROCEDURE EVALUATION
03/22/2024  Kirti Morris is a 53 y.o., female.      Pre-op Assessment    I have reviewed the Patient Summary Reports.    I have reviewed the NPO Status.   I have reviewed the Medications.     Review of Systems         Anesthesia Plan  Type of Anesthesia, risks & benefits discussed:    Anesthesia Type: Gen ETT  Intra-op Monitoring Plan: Standard ASA Monitors  Post Op Pain Control Plan: IV/PO Opioids PRN  Induction:  IV  Informed Consent: Informed consent signed with the Patient and all parties understand the risks and agree with anesthesia plan.  All questions answered.   ASA Score: 3    Ready For Surgery From Anesthesia Perspective.     .  NPO greater than 8 hours  Prone to apnea post anesthesia  Allergies      Atorvastatin  Not Specified  6/2/2021   LIPITOR   Dog Dander  Not Specified  7/28/2022   Dog hair & epithelia   House Dust Mite  Not Specified  7/28/2022   D. Ptery, D. Farinae   Levsin [Hyoscyamine Sulfate]  Not Specified  6/2/2021      Nuts [Tree Nut]  Not Specified  6/2/2021   BRAZIL NUTS   Pravastatin  Not Specified  6/2/2021      Sulfa (Sulfonamide Antibiotics)  Not Specified  6/2/2021      Weed Pollen-short Ragweed         Hct 45  Cr 2.0  2/29/24 EKG: Normal sinus rhythm with sinus arrhythmia; 63 bpm  Nonspecific T wave abnormality     Myocardial infarction    Other Medical History   Hyperlipidemia Hypertension   COPD (chronic obstructive pulmonary disease) Anxiety   Angina at rest Cerebral infarction   Nonrheumatic mitral (valve) insufficiency Coronary artery disease   Vitamin D deficiency Asthma   Combined B12 and folate deficiency anemia Sleep apnea   Stroke GERD (gastroesophageal reflux disease)   CRF (chronic renal failure) Arthritis   Some difficulty with letters and numbers secondary to her CVA  H/o back pain  Smoker  Chronic bronchitis    Airway exam deferred (COVID precautions);  adequate ROM at neck.

## 2024-03-25 LAB
ESTROGEN SERPL-MCNC: NORMAL PG/ML
INSULIN SERPL-ACNC: NORMAL U[IU]/ML
LAB AP GROSS DESCRIPTION: NORMAL
LAB AP LABORATORY NOTES: NORMAL
T3RU NFR SERPL: NORMAL %

## 2024-05-01 ENCOUNTER — HOSPITAL ENCOUNTER (OUTPATIENT)
Dept: RADIOLOGY | Facility: HOSPITAL | Age: 54
Discharge: HOME | End: 2024-05-01
Attending: ANESTHESIOLOGY
Payer: COMMERCIAL

## 2024-05-01 DIAGNOSIS — M54.16 LUMBAR RADICULOPATHY: ICD-10-CM

## 2024-05-01 PROCEDURE — 72148 MRI LUMBAR SPINE W/O DYE: CPT | Mod: TC

## 2024-05-21 LAB
OHS QRS DURATION: 82 MS
OHS QTC CALCULATION: 423 MS

## 2024-05-28 ENCOUNTER — OFFICE VISIT (OUTPATIENT)
Dept: FAMILY MEDICINE | Facility: CLINIC | Age: 54
End: 2024-05-28
Payer: COMMERCIAL

## 2024-05-28 VITALS
OXYGEN SATURATION: 94 % | BODY MASS INDEX: 34.85 KG/M2 | RESPIRATION RATE: 19 BRPM | SYSTOLIC BLOOD PRESSURE: 103 MMHG | HEIGHT: 65 IN | WEIGHT: 209.19 LBS | HEART RATE: 89 BPM | DIASTOLIC BLOOD PRESSURE: 74 MMHG | TEMPERATURE: 99 F

## 2024-05-28 DIAGNOSIS — J20.9 ACUTE BRONCHITIS, UNSPECIFIED ORGANISM: Primary | ICD-10-CM

## 2024-05-28 DIAGNOSIS — J01.90 ACUTE SINUSITIS, RECURRENCE NOT SPECIFIED, UNSPECIFIED LOCATION: ICD-10-CM

## 2024-05-28 LAB
CTP QC/QA: YES
CTP QC/QA: YES
POC MOLECULAR INFLUENZA A AGN: NEGATIVE
POC MOLECULAR INFLUENZA B AGN: NEGATIVE
SARS-COV-2 AG RESP QL IA.RAPID: NEGATIVE

## 2024-05-28 RX ORDER — METHYLPREDNISOLONE ACETATE 40 MG/ML
40 INJECTION, SUSPENSION INTRA-ARTICULAR; INTRALESIONAL; INTRAMUSCULAR; SOFT TISSUE ONCE
Status: COMPLETED | OUTPATIENT
Start: 2024-05-28 | End: 2024-05-28

## 2024-05-28 RX ORDER — DEXAMETHASONE SODIUM PHOSPHATE 4 MG/ML
4 INJECTION, SOLUTION INTRA-ARTICULAR; INTRALESIONAL; INTRAMUSCULAR; INTRAVENOUS; SOFT TISSUE
Status: COMPLETED | OUTPATIENT
Start: 2024-05-28 | End: 2024-05-28

## 2024-05-28 RX ORDER — AZITHROMYCIN 250 MG/1
TABLET, FILM COATED ORAL
Qty: 6 TABLET | Refills: 0 | Status: SHIPPED | OUTPATIENT
Start: 2024-05-28 | End: 2024-06-02

## 2024-05-28 RX ADMIN — DEXAMETHASONE SODIUM PHOSPHATE 4 MG: 4 INJECTION, SOLUTION INTRA-ARTICULAR; INTRALESIONAL; INTRAMUSCULAR; INTRAVENOUS; SOFT TISSUE at 10:05

## 2024-05-28 RX ADMIN — METHYLPREDNISOLONE ACETATE 40 MG: 40 INJECTION, SUSPENSION INTRA-ARTICULAR; INTRALESIONAL; INTRAMUSCULAR; SOFT TISSUE at 10:05

## 2024-05-28 NOTE — PATIENT INSTRUCTIONS
Cough  To help you feel better:  Use a cool mist humidifier to avoid breathing dry air.  Use hard candy or cough drops to soothe sore throat and cough.  Gargle with salt water (mix 1/2 teaspoon salt with 1 cup warm water) a few times a day.  Spray saltwater mist in each nostril. Any normal saline spray works.  Sip warm liquids to keep your throat moist.  Take warm, steamy showers to help soothe the cough.  Do not smoke or be in smoke-filled places. Avoid things that may cause breathing problems like vaping, fumes, pollution, dust, and other common allergens.      Sinusitis   Try to thin the mucus.  Drink lots of liquids to stay hydrated.  Use a cool mist humidifier to avoid dry air.  Use saline nose drops or a saline nose rinse to relieve stuffiness.  Wash your hands often. This will help keep others healthy.  Do not smoke or be in smoke-filled places. Avoid things that may cause breathing problems like fumes, pollution, dust, and other common allergens and irritants.  You may want to take medicine like ibuprofen, naproxen, or acetaminophen to help with pain.    Bronchitis  Do not smoke or be in smoke-filled places. Avoid other things that may cause breathing problems like fumes, pollution, dust, and other common allergens.  Drink lots of water, juice, or broth to replace fluids lost in runny nose and fever.  If you have medicines to take when you are feeling short of breath, be sure to carry them with you. Then, you can take them when needed.  If you smoke, stop.  Take warm, steamy showers to help soothe the cough.  Use a cool mist humidifier. This may make it easier to breathe.  Use hard candy or cough drops to soothe sore throat and cough.  Wash your hands often. This will help prevent you from spreading germs to others.  It may take 1-3 weeks to feel better

## 2024-05-28 NOTE — PROGRESS NOTES
[facility]    Denice Calhoun AGPCNP-BC  [facility] [place] [facility] [address] [place] [address]  [phone]          PATIENT NAME: Kirti Morris  : 1970  DATE: 24  MRN: [id number]          Reason for Visit        Chief Complaint   Patient presents with    Cough     Productive cough with green sputum. Reports cough starting Saturday     Chest Pain     Chest pain when coughing.     Headache    facial tenderness     Around eye and nasal area     Fatigue       History of Present Illness      Kirti Morris is a 53 y.o. female with chronic conditions of Asthma, COPD, Vitamin B 12 Deficiency, Hypertension, Vitamin D Deficiency, CAD, Anxiety, GERD, Hyperlipidemia, Sleep Apnea, Hx of CVA, Obesity, and Chronic Pain Syndrome who presents today for cough, chest pain secondary to coughing, and headache, and facial tenderness around eyes and nose, and fatigue. Influenza A, Influenza B, and Covid all negative today in clinic. Sinus series & chest x-ray pending at the time of discharge.       MEDICAL / SURGICAL / SOCIAL HISTORY     Past Medical History:   Diagnosis Date    Angina at rest 2021    Anxiety     Arthritis     Asthma     Cerebral infarction 2021    Combined B12 and folate deficiency anemia 2020    COPD (chronic obstructive pulmonary disease)     Coronary artery disease     acute MI 2014    CRF (chronic renal failure)     GERD (gastroesophageal reflux disease)     Hyperlipidemia     Hypertension     Myocardial infarction     Nonrheumatic mitral (valve) insufficiency 2018    Sleep apnea 2020    Stroke     Vitamin D deficiency 2020       Past Surgical History:   Procedure Laterality Date    CORONARY ANGIOPLASTY WITH STENT PLACEMENT  2014    HYSTERECTOMY      LAPAROSCOPIC CHOLECYSTECTOMY N/A 3/22/2024    Procedure: CHOLECYSTECTOMY, LAPAROSCOPIC;  Surgeon: Preston Bender MD;  Location:  [facility] OR;  Service: General;  Laterality: N/A;  Lysis of adhesions    LAPAROSCOPIC LYSIS OF ADHESIONS N/A 3/22/2024    Procedure: LYSIS, ADHESIONS, LAPAROSCOPIC;  Surgeon: Preston Bender MD;  Location: [facility] OR;  Service: General;  Laterality: N/A;    RADIOFREQUENCY ABLATION Right 10/13/2021    Procedure: RADIOFREQUENCY ABLATION;  Surgeon: David Combs MD;  Location: [facility]IN [facility];  Service: Pain Management;  Laterality: Right;  Right SI RFTC       Social History     Tobacco Use    Smoking status: Every Day     Current packs/day: 1.00     Average packs/day: 1 pack/day for 38.4 years (38.4 ttl pk-yrs)     Types: Cigarettes     Start date: 1986     Passive exposure: Current    Smokeless tobacco: Never    Tobacco comments:     Smoker since age 16. Never had a CT of chest   Substance Use Topics    Alcohol use: Not Currently     Comment: social    Drug use: Never         I personally reviewed all past medical, surgical, and social.     Review of Systems   Constitutional:  Positive for appetite change. Negative for fever.   HENT:  Positive for postnasal drip, sinus pressure and sinus pain. Negative for congestion, rhinorrhea and sore throat.    Respiratory:  Positive for cough and shortness of breath (with exertion).    Cardiovascular:  Positive for chest pain (with cough).   Gastrointestinal:  Negative for abdominal pain.   Genitourinary:  Negative for dysuria, frequency and urgency.   Musculoskeletal:  Positive for arthralgias and myalgias.   Neurological:  Positive for headaches. Negative for dizziness.        MEDICATIONS / ALLERGIES / HM     Current Outpatient Medications   Medication Sig Dispense Refill    albuterol (PROVENTIL/VENTOLIN HFA) 90 mcg/actuation inhaler Inhale 2 puffs into the lungs every 6 (six) hours as needed for Wheezing. INHALE TWO PUFFS BY MOUTH TWICE DAILY FOR SHORTNESS OF BREATH / not more THAN FOUR TIMES DAILY 18 g 11    amLODIPine (NORVASC) 10 MG tablet Take 1 tablet (10  mg total) by mouth once daily. 30 tablet 5    ascorbic acid, vitamin C, (VITAMIN C) 500 MG tablet 500 mg.      aspirin (ECOTRIN) 81 MG EC tablet Take 81 mg by mouth once daily.      budesonide-formoterol 160-4.5 mcg (SYMBICORT) 160-4.5 mcg/actuation HFAA Inhale 2 puffs into the lungs every 12 (twelve) hours. Controller 10.2 g 5    cetirizine (ZYRTEC) 10 MG tablet Take 1 tablet (10 mg total) by mouth once daily. 30 tablet 5    cholecalciferol, vitamin D3, (VITAMIN D3) 250 mcg (10,000 unit) Cap TAKE ONE CAPSULE BY MOUTH ONE TIME DAILY 90 capsule 0    clopidogreL (PLAVIX) 75 mg tablet Take 1 tablet (75 mg total) by mouth once daily. 30 tablet 5    DULoxetine (CYMBALTA) 60 MG capsule Take 1 capsule (60 mg total) by mouth once daily. 30 capsule 5    esomeprazole (NEXIUM) 40 MG capsule Take 40 mg by mouth before breakfast.      ezetimibe (ZETIA) 10 mg tablet Take 10 mg by mouth once daily.      fluticasone propionate (FLONASE) 50 mcg/actuation nasal spray 1 spray (50 mcg total) by Each Nostril route once daily. 15 g 2    gabapentin (NEURONTIN) 600 MG tablet Take 1 tablet (600 mg total) by mouth 3 (three) times daily. 90 tablet 5    HYDROcodone-acetaminophen (NORCO) 7.5-325 mg per tablet Take 1 tablet by mouth 2 (two) times daily as needed.      HYDROcodone-acetaminophen (NORCO) 7.5-325 mg per tablet Take 1 tablet by mouth every 6 (six) hours as needed for Pain. 15 tablet 0    hydrOXYzine pamoate (VISTARIL) 25 MG Cap Take 1 capsule (25 mg total) by mouth every 8 (eight) hours as needed (anxiety). 30 capsule 5    losartan (COZAAR) 50 MG tablet Take 1 tablet (50 mg total) by mouth once daily. 30 tablet 5    multivitamin/iron/folic acid (CENTRUM COMPLETE ORAL) Take by mouth Daily.      mv-mn/FA/bl coh/isoflav/jujube (ESTROVEN MENOPAUSE ORAL) Take by mouth once daily.      REPATHA SURECLICK 140 mg/mL PnIj Inject 140 mg into the skin every 14 (fourteen) days.      tiZANidine (ZANAFLEX) 4 MG tablet Take 1 tablet (4 mg total) by  mouth every 6 (six) hours as needed (muscle strain). 30 tablet 1    albuterol (PROVENTIL) 2.5 mg /3 mL (0.083 %) nebulizer solution Take 3 mLs (2.5 mg total) by nebulization every 6 (six) hours as needed for Wheezing. Rescue (Patient not taking: Reported on 5/28/2024) 120 each 1    azithromycin (Z-PADMINI) 250 MG tablet Take 2 tablets by mouth on day 1; Take 1 tablet by mouth on days 2-5 6 tablet 0    ciprofloxacin HCl (CIPRO) 500 MG tablet Take 1 tablet (500 mg total) by mouth every 12 (twelve) hours. 20 tablet 0    famotidine (PEPCID) 20 MG tablet Take 1 tablet (20 mg total) by mouth 2 (two) times daily. 60 tablet 5    metroNIDAZOLE (FLAGYL) 500 MG tablet Take 1 tablet (500 mg total) by mouth every 12 (twelve) hours. 20 tablet 0    nitroGLYCERIN (NITROSTAT) 0.4 MG SL tablet Place 1 tablet (0.4 mg total) under the tongue every 5 (five) minutes as needed for Chest pain. (Patient not taking: Reported on 5/28/2024) 25 tablet 1    nystatin (MYCOSTATIN) 100,000 unit/mL suspension Take 4 mLs (400,000 Units total) by mouth 4 (four) times daily. for 10 days (Patient not taking: Reported on 5/28/2024) 160 mL 0    zinc gluconate 50 mg tablet Take 50 mg by mouth once daily. Takes as needed (Patient not taking: Reported on 5/28/2024)       No current facility-administered medications for this visit.       Review of patient's allergies indicates:   Allergen Reactions    Atorvastatin      LIPITOR    Dog dander      Dog hair & epithelia    House dust mite      D. Ptery, D. Farinae    Levsin [hyoscyamine sulfate]     Nuts [tree nut]      BRAZIL NUTS    Pravastatin     Sulfa (sulfonamide antibiotics)     Weed pollen-short ragweed        Immunization History   Administered Date(s) Administered    Influenza - Quadrivalent - PF *Preferred* (6 months and older) 12/08/2021    PPD Test 01/10/2024        Health Maintenance   Topic Date Due    Hepatitis C Screening  Never done    TETANUS VACCINE  Never done    High Dose Statin  Never done     "LDCT Lung Screen  Never done    Shingles Vaccine (1 of 2) Never done    Mammogram  07/23/2022    Lipid Panel  10/04/2028    Colorectal Cancer Screening  04/25/2032        Physical Exam      Vital Signs  Temp: 98.9 °F (37.2 °C)  Temp Source: Oral  Pulse: 89  Resp: 19  SpO2: (!) 94 %  BP: 103/74  BP Location: Right arm  Patient Position: Sitting  Pain Score:   3  Pain Loc: Generalized  Height and Weight  Height: 5' 5" (165.1 cm)  Weight: 94.9 kg (209 lb 3.2 oz)  BSA (Calculated - sq m): 2.09 sq meters  BMI (Calculated): 34.8  Weight in (lb) to have BMI = 25: 149.9]    Physical Exam  Constitutional:       General: She is not in acute distress.     Appearance: She is ill-appearing.   HENT:      Right Ear: External ear normal.      Left Ear: External ear normal.   Cardiovascular:      Rate and Rhythm: Normal rate and regular rhythm.      Pulses: Normal pulses.      Heart sounds: Normal heart sounds.   Pulmonary:      Effort: Pulmonary effort is normal.      Breath sounds: Rhonchi present.   Abdominal:      Palpations: Abdomen is soft.      Tenderness: There is no abdominal tenderness.   Skin:     General: Skin is warm and dry.   Neurological:      Mental Status: She is alert and oriented to person, place, and time.   Psychiatric:         Mood and Affect: Mood normal.         Behavior: Behavior normal.          Laboratory:    Lab Results   Component Value Date     (H) 02/29/2024     02/29/2024    K 3.9 02/29/2024     02/29/2024    CO2 25 02/29/2024    BUN 13 02/29/2024    CREATININE 2.04 (H) 02/29/2024    CALCIUM 9.6 02/29/2024    PROT 7.0 02/29/2024    ALBUMIN 3.8 02/29/2024    BILITOT 0.2 02/29/2024    ALKPHOS 118 (H) 02/29/2024    AST 20 02/29/2024    ALT 23 02/29/2024    ANIONGAP 16 02/29/2024    EGFRNONAA 34 (L) 08/05/2022       Lab Results   Component Value Date    WBC 8.95 02/29/2024    RBC 4.55 02/29/2024    HGB 14.3 02/29/2024    HCT 44.5 02/29/2024    MCV 97.8 (H) 02/29/2024    RDW 12.9 " 02/29/2024     02/29/2024        Lab Results   Component Value Date    CHOL 129 10/04/2023    TRIG 228 (H) 10/04/2023    HDL 53 10/04/2023    LDLCALC 30 10/04/2023       Lab Results   Component Value Date    TSH 1.680 08/05/2022       Lab Results   Component Value Date    HGBA1C 5.5 05/23/2023    ESTIMATEDAVG 97 05/23/2023        Lab Results   Component Value Date    [id number] 288 12/12/2023       Lab Results   Component Value Date    JEUCOFQU67QZ 68.3 12/12/2023     Point Of Care Testing:    WBC, UA   Date Value Ref Range Status   02/26/2024 neg  Final     Nitrites, UA   Date Value Ref Range Status   02/29/2024 Positive (A) Negative Final     Urobilinogen, UA   Date Value Ref Range Status   02/29/2024 0.2 0.2, 1.0, Normal mg/dL Final     Protein, POC   Date Value Ref Range Status   02/26/2024 neg  Final     pH, UA   Date Value Ref Range Status   02/29/2024 5.5 5.0 to 8.0 pH Units Final     Blood, UA   Date Value Ref Range Status   02/26/2024 neg  Final     Specific Gravity, UA   Date Value Ref Range Status   02/29/2024 >=1.030 (A) <=1.005, 1.010, 1.015, 1.020, 1.025, 1.030 Final     Ketones, UA   Date Value Ref Range Status   02/29/2024 Negative Negative mg/dL Final     Bilirubin, POC   Date Value Ref Range Status   02/26/2024 neg  Final     Glucose, UA   Date Value Ref Range Status   02/26/2024 neg  Final       Lab Results   Component Value Date    FLUAMOLEC Negative 05/28/2024    FLUBMOLEC Negative 05/28/2024             Assessment/Plan     Acute bronchitis, unspecified organism  -     SARS Coronavirus 2 Antigen, POCT-NEGATIVE  -     POCT Influenza A/B Molecular-NEGATIVE  -     productive cough green sputum during visit   -     X-Ray Chest PA And Lateral; Future; Expected date: 05/28/2024  -     azithromycin (Z-PADMINI) 250 MG tablet; Take 2 tablets by mouth on day 1; Take 1 tablet by mouth on days 2-5  Dispense: 6 tablet; Refill: 0    Acute sinusitis, recurrence not specified, unspecified location  -      SARS Coronavirus 2 Antigen, POCT-NEGATIVE  -     POCT Influenza A/B Molecular-NEGATIVE  -     X-Ray Sinuses 3 or more views; Future; Expected date: 05/28/2024  -     dexAMETHasone injection 4 mg  -     methylPREDNISolone acetate injection 40 mg        Future Appointments   Date Time Provider Department Center   5/29/2024  9:20 AM Ulysses Grove MD [facility]   6/12/2024  3:40 PM Kim Justice FNP [facility]   8/7/2024  2:40 PM Ugo Chambers MD [facility]       Workup results were reviewed and all questions were answered. Diagnosis and treatment options were discussed and the patient  is amenable with the overall treatment plan. Verbal and written discharge instructions were given including to return to clinic/ED with any acute worsening of symptoms or failure of symptoms to improve. The reasons for return to the clinic/ED were explained in lay terms. No further intervention is warranted at this time. The patient agrees with the plan, expresses understanding, is hemodynamically stable and in no acute distress.     All questions answered to desired level of satisfaction          BC Montano-BC [facility]   I, Keaton Heath MD, have reviewed the chart and agree with the assessment and plan presented above.

## 2024-05-29 ENCOUNTER — OFFICE VISIT (OUTPATIENT)
Dept: PULMONOLOGY | Facility: CLINIC | Age: 54
End: 2024-05-29
Payer: COMMERCIAL

## 2024-05-29 VITALS
DIASTOLIC BLOOD PRESSURE: 78 MMHG | HEART RATE: 61 BPM | OXYGEN SATURATION: 97 % | BODY MASS INDEX: 34.82 KG/M2 | WEIGHT: 209 LBS | SYSTOLIC BLOOD PRESSURE: 106 MMHG | HEIGHT: 65 IN | RESPIRATION RATE: 16 BRPM

## 2024-05-29 DIAGNOSIS — F17.210 NICOTINE DEPENDENCE, CIGARETTES, UNCOMPLICATED: ICD-10-CM

## 2024-05-29 DIAGNOSIS — Z12.2 ENCOUNTER FOR SCREENING FOR LUNG CANCER: ICD-10-CM

## 2024-05-29 DIAGNOSIS — J44.9 CHRONIC OBSTRUCTIVE PULMONARY DISEASE, UNSPECIFIED COPD TYPE: ICD-10-CM

## 2024-05-29 DIAGNOSIS — J40 BRONCHITIS: Primary | ICD-10-CM

## 2024-05-29 DIAGNOSIS — R06.02 SHORTNESS OF BREATH: ICD-10-CM

## 2024-05-29 PROCEDURE — G0296 VISIT TO DETERM LDCT ELIG: HCPCS | Mod: ,,, | Performed by: STUDENT IN AN ORGANIZED HEALTH CARE EDUCATION/TRAINING PROGRAM

## 2024-05-29 PROCEDURE — 99407 BEHAV CHNG SMOKING > 10 MIN: CPT | Mod: S$PBB,,, | Performed by: STUDENT IN AN ORGANIZED HEALTH CARE EDUCATION/TRAINING PROGRAM

## 2024-05-29 PROCEDURE — 99204 OFFICE O/P NEW MOD 45 MIN: CPT | Mod: S$PBB,25,, | Performed by: STUDENT IN AN ORGANIZED HEALTH CARE EDUCATION/TRAINING PROGRAM

## 2024-05-29 PROCEDURE — 4010F ACE/ARB THERAPY RXD/TAKEN: CPT | Mod: CPTII,,, | Performed by: STUDENT IN AN ORGANIZED HEALTH CARE EDUCATION/TRAINING PROGRAM

## 2024-05-29 PROCEDURE — 3078F DIAST BP <80 MM HG: CPT | Mod: CPTII,,, | Performed by: STUDENT IN AN ORGANIZED HEALTH CARE EDUCATION/TRAINING PROGRAM

## 2024-05-29 PROCEDURE — 1159F MED LIST DOCD IN RCRD: CPT | Mod: CPTII,,, | Performed by: STUDENT IN AN ORGANIZED HEALTH CARE EDUCATION/TRAINING PROGRAM

## 2024-05-29 PROCEDURE — 3008F BODY MASS INDEX DOCD: CPT | Mod: CPTII,,, | Performed by: STUDENT IN AN ORGANIZED HEALTH CARE EDUCATION/TRAINING PROGRAM

## 2024-05-29 PROCEDURE — 3074F SYST BP LT 130 MM HG: CPT | Mod: CPTII,,, | Performed by: STUDENT IN AN ORGANIZED HEALTH CARE EDUCATION/TRAINING PROGRAM

## 2024-05-29 PROCEDURE — 99215 OFFICE O/P EST HI 40 MIN: CPT | Mod: PBBFAC | Performed by: STUDENT IN AN ORGANIZED HEALTH CARE EDUCATION/TRAINING PROGRAM

## 2024-05-29 RX ORDER — PANTOPRAZOLE SODIUM 40 MG/1
40 TABLET, DELAYED RELEASE ORAL 2 TIMES DAILY
COMMUNITY
Start: 2024-05-24

## 2024-05-29 RX ORDER — LABETALOL 200 MG/1
200 TABLET, FILM COATED ORAL 2 TIMES DAILY
COMMUNITY
Start: 2024-05-03

## 2024-05-29 NOTE — PROGRESS NOTES
Patient Name: Kirti Morris   Primary Care Provider: Susana Primary Doctor   Date of Service: 05/29/2024   Reason for Referral:  shortness of breath    Chief Complaint: Cough, Chest Pain, and Shortness of Breath (With and without exertion)      Subjective:      Kirti Morris is 53 y.o. female with  Past medical history significant for sinusitis, active smoking, who recently presents to the Pulmonary Clinic as an evaluation for shortness of breath.      Initial clinic visit 5/29/24   The patient is an active smoker of 38 years (38 pack-year history of smoking), saturating 97% on room air.  She has been dyspneic on exertion, with the occasional wheezing.  Most recently she has had an exacerbation of her underlying sinusitis and has been treated with medication (azithromycin antibiotic).  She has a prior history of strokes, on Plavix and aspirin.  She continues to smoke cigarettes and her recent efforts to quit has been unsuccessful.      Personal Diagnostics Review  I personally reviewed and interpreted the following     I reviewed her recent chest x-ray without evidence of consolidation or pneumothorax/pleural effusion.  I also reviewed her recent lab work which was performed in 2/20/24 which showed a white count which was normal at 8.95, no significant anemia with a hemoglobin of 14.3, elevated creatinine at 2.04 and sodium of 140.         Assessment and Plan      COPD (emphysema versus chronic bronchitis phenotype )   Dyspnea on exertion   Dyspnea   Sinusitis      Assessment:   has evidence of COPD based on clinical exam  And history, pending pulmonary function testing. History of sinusitis, on antibiotics at this time.  Continues on Symbicort with some relief    Plan   Continue with Symbicort, may need advancement to triple inhaled therapy pending pulmonary function testing   Complete PFTs with pre and post bronchodilator testing  and 6 minute walk test      Nicotine dependence, cigarettes   Tobacco cessation  counseling      Assessment:  Patient currently smokes 1 pack per day.  We spent a significant amount of time talking about tobacco cessation, the factors that keep the patient smoking such as habit and cravings.  We also discussed in details ways to stop smoking after the patient expressed a strong desire to quit. The patient is not interested in nicotine replacement therapy at this time.  Duration of counseling approximately 11 minutes.    Plan  We will continue to monitor progress on next visit   Nicotine replacement therapy is not prescribed at this time (patches and p.r.n. lozenges)         Lung cancer screening      Assessment:  The patient meets criteria for lung cancer screening (has a greater than 20 pack-year history of smoking, is  53 years old and has not quit more than 15 years ago).  We had a discussion regarding what lung cancer screening entails and after shared decision-making, she agreed to low-dose CT scan.    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    COPD (chronic obstructive pulmonary disease)     Coronary artery disease     acute MI 02/2014    CRF (chronic renal failure)     GERD (gastroesophageal reflux disease)     Hyperlipidemia     Hypertension     Myocardial infarction     Nonrheumatic mitral (valve) insufficiency 05/21/2018    Sleep apnea 01/06/2020    Stroke     Vitamin D deficiency 07/31/2020      Past Surgical History:   Procedure Laterality Date    BACK SURGERY  2009    CORONARY ANGIOPLASTY WITH STENT PLACEMENT  02/07/2014     HYSTERECTOMY  1996    LAPAROSCOPIC CHOLECYSTECTOMY N/A 03/22/2024    Procedure: CHOLECYSTECTOMY, LAPAROSCOPIC;  Surgeon: Preston Bender MD;  Location: [facility] OR;  Service: General;  Laterality: N/A;  Lysis of adhesions    LAPAROSCOPIC LYSIS OF ADHESIONS N/A 03/22/2024    Procedure: LYSIS, ADHESIONS, LAPAROSCOPIC;  Surgeon: Preston Bender MD;  Location: [facility] OR;  Service: General;  Laterality: N/A;    RADIOFREQUENCY ABLATION Right 10/13/2021    Procedure: RADIOFREQUENCY ABLATION;  Surgeon: David Combs MD;  Location: [facility] PAIN MGMT;  Service: Pain Management;  Laterality: Right;  Right SI RFTC     Family History   Problem Relation Name Age of Onset    Arthritis Mother      Ear disease Father      Cancer Father      Ovarian cancer Maternal Aunt       Review of patient's allergies indicates:   Allergen Reactions    Atorvastatin      LIPITOR    Dog dander      Dog hair & epithelia    House dust mite      D. Ptery, D. Farinae    Levsin [hyoscyamine sulfate]     Nuts [tree nut]      BRAZIL NUTS    Pravastatin     Sulfa (sulfonamide antibiotics)     Weed pollen-short ragweed       Social History     Tobacco Use    Smoking status: Every Day     Current packs/day: 1.00     Average packs/day: 1 pack/day for 38.4 years (38.4 ttl pk-yrs)     Types: Cigarettes     Start date: 1986     Passive exposure: Current    Smokeless tobacco: Never    Tobacco comments:     Smoker since age 16. Never had a CT of chest   Substance Use Topics    Alcohol use: Not Currently     Comment: social    Drug use: Never      Review of Systems       Objective:      Physical Exam  Constitutional:       General: She is not in acute distress.     Appearance: Normal appearance. She is normal weight. She is not ill-appearing or diaphoretic.   HENT:      Head: Normocephalic and atraumatic.      Nose: No congestion or rhinorrhea.      Mouth/Throat:      Mouth: Mucous membranes are moist.   Cardiovascular:      Rate and Rhythm:  "Normal rate and regular rhythm.      Pulses: Normal pulses.      Heart sounds: Normal heart sounds.   Pulmonary:      Effort: Pulmonary effort is normal. No respiratory distress.      Breath sounds: Normal breath sounds. No stridor. No wheezing, rhonchi or rales.   Chest:      Chest wall: No tenderness.   Abdominal:      General: Abdomen is flat.   Musculoskeletal:      Cervical back: Normal range of motion. No rigidity.      Right lower leg: No edema.      Left lower leg: No edema.   Skin:     General: Skin is warm.      Findings: No erythema.   Neurological:      General: No focal deficit present.      Mental Status: She is alert and oriented to person, place, and time. Mental status is at baseline.   Psychiatric:         Mood and Affect: Mood normal.         Behavior: Behavior normal.         Thought Content: Thought content normal.                5/29/2024     9:29 AM 5/28/2024     8:52 AM 3/22/2024     2:00 PM 3/22/2024     1:45 PM 3/22/2024     1:36 PM 3/22/2024     1:25 PM 3/22/2024     1:20 PM   Pulmonary Function Tests   SpO2 97 % 94 % 96 % 94 % 96 % 94 % 97 %   Height 5' 5" (1.651 m) 5' 5" (1.651 m)        Weight 94.8 kg (209 lb) 94.9 kg (209 lb 3.2 oz)        BMI (Calculated) 34.8 34.8              Outpatient Encounter Medications as of 5/29/2024   Medication Sig Dispense Refill    albuterol (PROVENTIL/VENTOLIN HFA) 90 mcg/actuation inhaler Inhale 2 puffs into the lungs every 6 (six) hours as needed for Wheezing. INHALE TWO PUFFS BY MOUTH TWICE DAILY FOR SHORTNESS OF BREATH / not more THAN FOUR TIMES DAILY 18 g 11    amLODIPine (NORVASC) 10 MG tablet Take 1 tablet (10 mg total) by mouth once daily. 30 tablet 5    ascorbic acid, vitamin C, (VITAMIN C) 500 MG tablet 500 mg.      aspirin (ECOTRIN) 81 MG EC tablet Take 81 mg by mouth once daily.      azithromycin (Z-PADMINI) 250 MG tablet Take 2 tablets by mouth on day 1; Take 1 tablet by mouth on days 2-5 6 tablet 0    budesonide-formoterol 160-4.5 mcg " (SYMBICORT) 160-4.5 mcg/actuation HFAA Inhale 2 puffs into the lungs every 12 (twelve) hours. Controller 10.2 g 5    cetirizine (ZYRTEC) 10 MG tablet Take 1 tablet (10 mg total) by mouth once daily. 30 tablet 5    cholecalciferol, vitamin D3, (VITAMIN D3) 250 mcg (10,000 unit) Cap TAKE ONE CAPSULE BY MOUTH ONE TIME DAILY 90 capsule 0    clopidogreL (PLAVIX) 75 mg tablet Take 1 tablet (75 mg total) by mouth once daily. 30 tablet 5    DULoxetine (CYMBALTA) 60 MG capsule Take 1 capsule (60 mg total) by mouth once daily. 30 capsule 5    ezetimibe (ZETIA) 10 mg tablet Take 10 mg by mouth once daily.      fluticasone propionate (FLONASE) 50 mcg/actuation nasal spray 1 spray (50 mcg total) by Each Nostril route once daily. 15 g 2    gabapentin (NEURONTIN) 600 MG tablet Take 1 tablet (600 mg total) by mouth 3 (three) times daily. 90 tablet 5    HYDROcodone-acetaminophen (NORCO) 7.5-325 mg per tablet Take 1 tablet by mouth 2 (two) times daily as needed.      HYDROcodone-acetaminophen (NORCO) 7.5-325 mg per tablet Take 1 tablet by mouth every 6 (six) hours as needed for Pain. 15 tablet 0    hydrOXYzine pamoate (VISTARIL) 25 MG Cap Take 1 capsule (25 mg total) by mouth every 8 (eight) hours as needed (anxiety). 30 capsule 5    labetaloL (NORMODYNE) 200 MG tablet Take 200 mg by mouth 2 (two) times daily.      losartan (COZAAR) 50 MG tablet Take 1 tablet (50 mg total) by mouth once daily. 30 tablet 5    multivitamin/iron/folic acid (CENTRUM COMPLETE ORAL) Take by mouth Daily.      mv-mn/FA/bl coh/isoflav/jujube (ESTROVEN MENOPAUSE ORAL) Take by mouth once daily.      nitroGLYCERIN (NITROSTAT) 0.4 MG SL tablet Place 1 tablet (0.4 mg total) under the tongue every 5 (five) minutes as needed for Chest pain. 25 tablet 1    pantoprazole (PROTONIX) 40 MG tablet Take 40 mg by mouth 2 (two) times daily.      REPATHA SURECLICK 140 mg/mL PnIj Inject 140 mg into the skin every 14 (fourteen) days.      tiZANidine (ZANAFLEX) 4 MG tablet Take  1 tablet (4 mg total) by mouth every 6 (six) hours as needed (muscle strain). 30 tablet 1    [DISCONTINUED] albuterol (PROVENTIL) 2.5 mg /3 mL (0.083 %) nebulizer solution Take 3 mLs (2.5 mg total) by nebulization every 6 (six) hours as needed for Wheezing. Rescue (Patient not taking: Reported on 5/28/2024) 120 each 1    [DISCONTINUED] ciprofloxacin HCl (CIPRO) 500 MG tablet Take 1 tablet (500 mg total) by mouth every 12 (twelve) hours. 20 tablet 0    [DISCONTINUED] esomeprazole (NEXIUM) 40 MG capsule Take 40 mg by mouth before breakfast.      [DISCONTINUED] famotidine (PEPCID) 20 MG tablet Take 1 tablet (20 mg total) by mouth 2 (two) times daily. 60 tablet 5    [DISCONTINUED] metroNIDAZOLE (FLAGYL) 500 MG tablet Take 1 tablet (500 mg total) by mouth every 12 (twelve) hours. 20 tablet 0    [DISCONTINUED] nystatin (MYCOSTATIN) 100,000 unit/mL suspension Take 4 mLs (400,000 Units total) by mouth 4 (four) times daily. for 10 days (Patient not taking: Reported on 5/28/2024) 160 mL 0    [DISCONTINUED] zinc gluconate 50 mg tablet Take 50 mg by mouth once daily. Takes as needed (Patient not taking: Reported on 5/28/2024)       Facility-Administered Encounter Medications as of 5/29/2024   Medication Dose Route Frequency Provider Last Rate Last Admin    [COMPLETED] dexAMETHasone injection 4 mg  4 mg Intramuscular 1 time in Clinic/HOD Denice Calhoun NP   4 mg at 05/28/24 1024    [COMPLETED] methylPREDNISolone acetate injection 40 mg  40 mg Intramuscular Once Denice Calhoun NP   40 mg at 05/28/24 1024       Assessment & Plan    As above                                          No orders of the defined types were placed in this encounter.

## 2024-06-03 ENCOUNTER — TELEPHONE (OUTPATIENT)
Dept: FAMILY MEDICINE | Facility: CLINIC | Age: 54
End: 2024-06-03
Payer: COMMERCIAL

## 2024-06-03 NOTE — TELEPHONE ENCOUNTER
Spoke with her concerning recommendation from uriel mckeon to help relieve her stuffiness and congestion

## 2024-06-24 ENCOUNTER — TELEPHONE (OUTPATIENT)
Dept: PULMONOLOGY | Facility: CLINIC | Age: 54
End: 2024-06-24
Payer: COMMERCIAL

## 2024-06-24 ENCOUNTER — HOSPITAL ENCOUNTER (OUTPATIENT)
Dept: RADIOLOGY | Facility: HOSPITAL | Age: 54
Discharge: HOME | End: 2024-06-24
Attending: STUDENT IN AN ORGANIZED HEALTH CARE EDUCATION/TRAINING PROGRAM
Payer: COMMERCIAL

## 2024-06-24 VITALS — WEIGHT: 220 LBS | BODY MASS INDEX: 36.65 KG/M2 | HEIGHT: 65 IN

## 2024-06-24 DIAGNOSIS — J40 BRONCHITIS: ICD-10-CM

## 2024-06-24 PROCEDURE — 71271 CT THORAX LUNG CANCER SCR C-: CPT | Mod: 26,,, | Performed by: RADIOLOGY

## 2024-06-24 PROCEDURE — 71271 CT THORAX LUNG CANCER SCR C-: CPT | Mod: TC

## 2024-06-24 NOTE — TELEPHONE ENCOUNTER
stated that she is not feeling well enough to complete her PFT and 6MWT test today. She has a cough and congestion and wanted to reschedule. She said she will still be able to come and get her CT done today though, but that was it.

## 2024-06-24 NOTE — TELEPHONE ENCOUNTER
----- Message from Brenda Lorenzo sent at 6/24/2024  9:09 AM CDT -----  Regarding: APPOINTMENT  Who Called: Kirti Morris      Who Left Message for Patient:  Does the patient know what this is regarding?:APPOINTMENT AND TEST SCHEDULED FOR TODAY      Preferred Method of Contact: Phone Call  Patient's Preferred Phone Number on File: [phone]   Best Call Back Number, if different:  Additional Information: PATIENT SAYS SHE IS NOT FEELING WELL TODAY AND WANTS TO SPEAK WITH SOMEONE ABOUT HER APPOINTMENT TODAY

## 2024-07-09 RX ORDER — LOSARTAN POTASSIUM 50 MG/1
50 TABLET ORAL
Qty: 30 TABLET | Refills: 0 | Status: SHIPPED | OUTPATIENT
Start: 2024-07-09

## 2024-07-16 RX ORDER — PANTOPRAZOLE SODIUM 40 MG/1
40 TABLET, DELAYED RELEASE ORAL 2 TIMES DAILY
Qty: 90 TABLET | Refills: 1 | OUTPATIENT
Start: 2024-07-16

## 2024-07-16 RX ORDER — DULOXETIN HYDROCHLORIDE 60 MG/1
60 CAPSULE, DELAYED RELEASE ORAL DAILY
Qty: 30 CAPSULE | Refills: 5 | OUTPATIENT
Start: 2024-07-16 | End: 2025-07-16

## 2024-07-16 NOTE — TELEPHONE ENCOUNTER
"Spoke with patient. She stated she is having symptoms of anxiety and feeling "jittery" since she has been out of cymbalta for 2 weeks. Patient missed appointment on 7/12/2024 and has an appointment scheduled with you on 7/18/2024. Patient also requesting refills on protonix.   " OPERATIVE PROCEDURE NOTE    ADMISSION DATE: 12/22/2021    EXAM DATE: 12/22/2021    PREOPERATIVE DIAGNOSIS:  1. Torn medial meniscus left knee   2. Chondromalacia left knee    POSTOPERATIVE DIAGNOSIS:  1. Torn medial meniscus left knee  2. Chondromalacia left knee   3. Synovitis left knee  4. Multiple small cartilaginous loose bodies, left knee    OPERATIVE PROCEDURE:  Left knee arthroscopy with:  1. Arthroscopic partial medial meniscectomy  2. Arthroscopic debridement of the patellofemoral joint and medial femoral condyle   3. Arthroscopic synovectomy  4. Arthroscopic removal of multiple cartilaginous loose bodies (all of them less than 7 mm).    SURGEON:  Pb Pyle M.D.    ASSISTANT:  Bailey Kent PA-C    ANESTHESIA:  General    BLOOD LOSS:  Minimal.    COMPLICATIONS:  None.    DESCRIPTION OF PROCEDURE:  The patient was brought into the operating room and placed supine on the operating room table. Routine anesthesia monitors were placed and general anesthesia was induced.  Two grams of preoperative Ancef was administered. The left knee was examined under anesthesia. There was a mild-moderate clear synovial effusion. Range of motion was from a few degrees shy of full extension to flexion of 115°. Ligaments were stable. The left leg was then positioned in a thigh casiano. A well-leg support was placed beneath the contralateral leg. The left lower extremity was then prepped and draped in the usual and sterile fashion. The limb was exsanguinated and the tourniquet inflated to 250 mmHg. A standard superomedial portal was established for arthroscopic inflow, followed by a standard anterolateral portal for arthroscopy and anteromedial portal, using needle localization for instrumentation. A diagnostic arthroscopy was then performed. The findings are as follows.    The suprapatellar pouch demonstrated multiple small (5 mm or so) cartilaginous loose bodies.  They appeared to be fragments that have shed from the  patella, trochlea and medial femoral condyle. The undersurface of the patella revealed grade 2-3 chondromalacia with lots of unstable cartilage fragments. The trochlear groove also revealed grade 2 chondromalacia. The medial compartment was entered next. The medial meniscus had a complex tear of at the junction of body and posterior horn. The medial femoral condyle revealed grade 2-3 chondromalacia. The medial tibial plateau revealed grade 2-3 chondromalacia. The intercondylar notch demonstrated a large synovial plica.  This appeared to be inflamed as did the synovium in the patellofemoral joint as well.  There was a large medial parapatellar plica as well as a ligamentum which was hypertrophic with impingement in the patellofemoral joint. The lateral compartment was entered next. The lateral meniscus had minor central free edge fraying. The lateral femoral condyle was unremarkable. The lateral tibial plateau was intact. The popliteus tendon was normal.  The popliteus recess had multiple cartilaginous loose fragments as well.    The operative arthroscopy was performed as follows.  Attention was then turned to the medial compartment to address the meniscal pathology. A partial medial meniscectomy was then performed with upbiting and straight basket cutters back to stable meniscal tissue.  A shaver was used to remove fragments of meniscal debris. Stability of the remaining meniscus was confirmed with the use of a probe.    The multiple cartilage loose bodies were now identified and removed a shaver as well as a grasper.  Again, these were small and did not require portal enlargement.  The chondromalacia of the patellofemoral joint was debrided with a shaver back to more stable articular cartilage.    A synovectomy was performed beginning with the ligamentum and extending to the enlarged medial plica and treating synovitis both in the medial as well as the lateral gutters.  After completion of the synovectomy, the  arthroscope was placed in the lateral gutter where some additional synovitis was present and was debrided.  The residual cartilaginous loose bodies are identified and removed where possible.    The knee was then copiously irrigated of arthroscopic fluid and aspirated dry. The portals were closed with Steri-Strips. The knee was infiltrated with 30 cc of 0.5% Ropivicaine.  A sterile dressing was applied as the patient was returned to the supine position, woken up, extubated and transferred to Day Surgery in stable condition.

## 2024-07-16 NOTE — TELEPHONE ENCOUNTER
----- Message from Sigrid Cortez sent at 7/16/2024 10:52 AM CDT -----  DULoxetine (CYMBALTA) 60 MG capsule  pantoprazole (PROTONIX) 40 MG tablet    Patient requests refill   stated she had a bad reaction to being off CYMBALTA

## 2024-07-18 ENCOUNTER — OFFICE VISIT (OUTPATIENT)
Dept: FAMILY MEDICINE | Facility: CLINIC | Age: 54
End: 2024-07-18
Payer: COMMERCIAL

## 2024-07-18 VITALS
SYSTOLIC BLOOD PRESSURE: 97 MMHG | BODY MASS INDEX: 34.49 KG/M2 | OXYGEN SATURATION: 98 % | DIASTOLIC BLOOD PRESSURE: 66 MMHG | RESPIRATION RATE: 18 BRPM | HEIGHT: 65 IN | WEIGHT: 207 LBS | HEART RATE: 70 BPM | TEMPERATURE: 99 F

## 2024-07-18 DIAGNOSIS — K21.9 GASTROESOPHAGEAL REFLUX DISEASE, UNSPECIFIED WHETHER ESOPHAGITIS PRESENT: ICD-10-CM

## 2024-07-18 DIAGNOSIS — F41.8 DEPRESSION WITH ANXIETY: ICD-10-CM

## 2024-07-18 DIAGNOSIS — Z86.73 HISTORY OF STROKE: ICD-10-CM

## 2024-07-18 DIAGNOSIS — I10 ESSENTIAL HYPERTENSION: ICD-10-CM

## 2024-07-18 DIAGNOSIS — R23.3 SPONTANEOUS BRUISING: Primary | ICD-10-CM

## 2024-07-18 DIAGNOSIS — Z72.0 TOBACCO USE: ICD-10-CM

## 2024-07-18 DIAGNOSIS — R63.4 WEIGHT LOSS, UNINTENTIONAL: ICD-10-CM

## 2024-07-18 LAB
ALBUMIN SERPL BCP-MCNC: 4 G/DL (ref 3.5–5)
ALBUMIN/GLOB SERPL: 1.1 {RATIO}
ALP SERPL-CCNC: 116 U/L (ref 41–108)
ALT SERPL W P-5'-P-CCNC: 27 U/L (ref 13–56)
ANION GAP SERPL CALCULATED.3IONS-SCNC: 10 MMOL/L (ref 7–16)
AST SERPL W P-5'-P-CCNC: 18 U/L (ref 15–37)
BASOPHILS # BLD AUTO: 0.07 K/UL (ref 0–0.2)
BASOPHILS NFR BLD AUTO: 0.9 % (ref 0–1)
BILIRUB SERPL-MCNC: 0.4 MG/DL (ref ?–1.2)
BUN SERPL-MCNC: 14 MG/DL (ref 7–18)
BUN/CREAT SERPL: 8 (ref 6–20)
CALCIUM SERPL-MCNC: 9.1 MG/DL (ref 8.5–10.1)
CHLORIDE SERPL-SCNC: 112 MMOL/L (ref 98–107)
CO2 SERPL-SCNC: 22 MMOL/L (ref 21–32)
CREAT SERPL-MCNC: 1.68 MG/DL (ref 0.55–1.02)
DIFFERENTIAL METHOD BLD: ABNORMAL
EGFR (NO RACE VARIABLE) (RUSH/TITUS): 36 ML/MIN/1.73M2
EOSINOPHIL # BLD AUTO: 0.17 K/UL (ref 0–0.5)
EOSINOPHIL NFR BLD AUTO: 2.2 % (ref 1–4)
ERYTHROCYTE [DISTWIDTH] IN BLOOD BY AUTOMATED COUNT: 13.1 % (ref 11.5–14.5)
GLOBULIN SER-MCNC: 3.5 G/DL (ref 2–4)
GLUCOSE SERPL-MCNC: 101 MG/DL (ref 74–106)
HCT VFR BLD AUTO: 45.7 % (ref 38–47)
HGB BLD-MCNC: 14.9 G/DL (ref 12–16)
IMM GRANULOCYTES # BLD AUTO: 0.02 K/UL (ref 0–0.04)
IMM GRANULOCYTES NFR BLD: 0.3 % (ref 0–0.4)
LYMPHOCYTES # BLD AUTO: 2.71 K/UL (ref 1–4.8)
LYMPHOCYTES NFR BLD AUTO: 35.4 % (ref 27–41)
MCH RBC QN AUTO: 31.6 PG (ref 27–31)
MCHC RBC AUTO-ENTMCNC: 32.6 G/DL (ref 32–36)
MCV RBC AUTO: 97 FL (ref 80–96)
MONOCYTES # BLD AUTO: 0.41 K/UL (ref 0–0.8)
MONOCYTES NFR BLD AUTO: 5.4 % (ref 2–6)
MPC BLD CALC-MCNC: 11.4 FL (ref 9.4–12.4)
NEUTROPHILS # BLD AUTO: 4.28 K/UL (ref 1.8–7.7)
NEUTROPHILS NFR BLD AUTO: 55.8 % (ref 53–65)
NRBC # BLD AUTO: 0 X10E3/UL
NRBC, AUTO (.00): 0 %
PLATELET # BLD AUTO: 300 K/UL (ref 150–400)
POTASSIUM SERPL-SCNC: 4.1 MMOL/L (ref 3.5–5.1)
PROT SERPL-MCNC: 7.5 G/DL (ref 6.4–8.2)
RBC # BLD AUTO: 4.71 M/UL (ref 4.2–5.4)
SODIUM SERPL-SCNC: 140 MMOL/L (ref 136–145)
TSH SERPL DL<=0.005 MIU/L-ACNC: 0.78 UIU/ML (ref 0.36–3.74)
VIT B12 SERPL-MCNC: 317 PG/ML (ref 193–986)
WBC # BLD AUTO: 7.66 K/UL (ref 4.5–11)

## 2024-07-18 PROCEDURE — 3008F BODY MASS INDEX DOCD: CPT | Mod: CPTII,,, | Performed by: NURSE PRACTITIONER

## 2024-07-18 PROCEDURE — 1159F MED LIST DOCD IN RCRD: CPT | Mod: CPTII,,, | Performed by: NURSE PRACTITIONER

## 2024-07-18 PROCEDURE — 99214 OFFICE O/P EST MOD 30 MIN: CPT | Mod: ,,, | Performed by: NURSE PRACTITIONER

## 2024-07-18 PROCEDURE — 1160F RVW MEDS BY RX/DR IN RCRD: CPT | Mod: CPTII,,, | Performed by: NURSE PRACTITIONER

## 2024-07-18 PROCEDURE — 82607 VITAMIN B-12: CPT | Mod: ,,, | Performed by: CLINICAL MEDICAL LABORATORY

## 2024-07-18 PROCEDURE — 80050 GENERAL HEALTH PANEL: CPT | Mod: ,,, | Performed by: CLINICAL MEDICAL LABORATORY

## 2024-07-18 PROCEDURE — 4010F ACE/ARB THERAPY RXD/TAKEN: CPT | Mod: CPTII,,, | Performed by: NURSE PRACTITIONER

## 2024-07-18 PROCEDURE — 3078F DIAST BP <80 MM HG: CPT | Mod: CPTII,,, | Performed by: NURSE PRACTITIONER

## 2024-07-18 PROCEDURE — 3074F SYST BP LT 130 MM HG: CPT | Mod: CPTII,,, | Performed by: NURSE PRACTITIONER

## 2024-07-18 RX ORDER — NITROGLYCERIN 0.4 MG/1
0.4 TABLET SUBLINGUAL EVERY 5 MIN PRN
Qty: 25 TABLET | Refills: 1 | Status: CANCELLED | OUTPATIENT
Start: 2024-07-18

## 2024-07-18 RX ORDER — TIZANIDINE 4 MG/1
4 TABLET ORAL EVERY 6 HOURS PRN
Qty: 30 TABLET | Refills: 1 | Status: CANCELLED | OUTPATIENT
Start: 2024-07-18

## 2024-07-18 RX ORDER — CLOPIDOGREL BISULFATE 75 MG/1
75 TABLET ORAL DAILY
Qty: 30 TABLET | Refills: 5 | Status: SHIPPED | OUTPATIENT
Start: 2024-07-18

## 2024-07-18 RX ORDER — LOSARTAN POTASSIUM 50 MG/1
50 TABLET ORAL DAILY
Qty: 30 TABLET | Refills: 5 | Status: SHIPPED | OUTPATIENT
Start: 2024-07-18

## 2024-07-18 RX ORDER — DULOXETIN HYDROCHLORIDE 60 MG/1
60 CAPSULE, DELAYED RELEASE ORAL DAILY
Qty: 30 CAPSULE | Refills: 5 | Status: SHIPPED | OUTPATIENT
Start: 2024-07-18

## 2024-07-18 RX ORDER — PANTOPRAZOLE SODIUM 40 MG/1
40 TABLET, DELAYED RELEASE ORAL 2 TIMES DAILY
Qty: 60 TABLET | Refills: 5 | Status: SHIPPED | OUTPATIENT
Start: 2024-07-18

## 2024-07-18 RX ORDER — GABAPENTIN 600 MG/1
600 TABLET ORAL 3 TIMES DAILY
Qty: 90 TABLET | Refills: 5 | Status: CANCELLED | OUTPATIENT
Start: 2024-07-18

## 2024-07-18 RX ORDER — AMLODIPINE BESYLATE 10 MG/1
10 TABLET ORAL DAILY
Qty: 30 TABLET | Refills: 5 | Status: SHIPPED | OUTPATIENT
Start: 2024-07-18

## 2024-07-18 RX ORDER — BUDESONIDE AND FORMOTEROL FUMARATE DIHYDRATE 160; 4.5 UG/1; UG/1
2 AEROSOL RESPIRATORY (INHALATION) EVERY 12 HOURS
Qty: 10.2 G | Refills: 5 | Status: CANCELLED | OUTPATIENT
Start: 2024-07-18

## 2024-07-18 NOTE — LETTER
AUTHORIZATION FOR RELEASE OF   CONFIDENTIAL INFORMATION    Dear Dr. TONY Conte,    We are seeing Kirti Morris, date of birth 1970, in the clinic at [facility]. Denice Calhoun NP is the patient's PCP. Kirti Morris has an outstanding lab/procedure at the time we reviewed her chart. In order to help keep her health information updated, she has authorized us to request the following medical record(s):        (  )  MAMMOGRAM                                      (  )  COLONOSCOPY      (  )  PAP SMEAR                                          (  )  OUTSIDE LAB RESULTS     (  )  DEXA SCAN                                          (  )  EYE EXAM            (  )  FOOT EXAM                                          (  )  ENTIRE RECORD     (  )  OUTSIDE IMMUNIZATIONS                 ( XX )  LAST OFFICE VISIT_         Please fax records to Denice Calhoun NP, [phone]     If you have any questions, please contact staff at [phone].           Patient Name: Kirti Morris  : 1970  Patient Phone #: [phone]

## 2024-07-18 NOTE — Clinical Note
Hepatitis C Screening Never done:ordered Pneumococcal Vaccines (Age 0-64)(1 of 2 - PCV) Never done:denied HIV Screening Never done:ordered TETANUS VACCINE Never done:denied High Dose Statin Never done: Shingles Vaccine(1 of 2) Never done:denied Mammogram due on 07/23/2022:had it done at [facility]-19 Vaccine(1 - 2023-24 season) Never done:denied

## 2024-07-18 NOTE — PROGRESS NOTES
[facility]    Denice Calhoun AGPCNP-BC [facility] [address] [place] [address]  [phone]          PATIENT NAME: Kirti Morris  : 1970  DATE: 24  MRN: [id number]          Reason for Visit        Chief Complaint   Patient presents with    Bleeding/Bruising     Unexplained bruising all over. Patient states bruises are appearing on her legs and arms and she doesn't know what is causing it.     Establish Care     Hepatitis C Screening Never done:ordered  Pneumococcal Vaccines (Age 0-64)(1 of 2 - PCV) Never done:denied  HIV Screening Never done:ordered  TETANUS VACCINE Never done:denied  High Dose Statin Never done:  Shingles Vaccine(1 of 2) Never done:denied  Mammogram due on 2022:had it done at [facility]  COVID-19 Vaccine(1 - 2023-24 season) Never done:denied      Groin Pain     Acute Groin pain that comes and goes. Has been doing this for about 2 months. Goes away fairly quickly but patient states the pain is intense.        History of Present Illness      Kirti Morris is a 53 y.o. female with chronic conditions of Asthma, COPD, Vitamin B 12 Deficiency, Hypertension, Vitamin D Deficiency, CAD, Anxiety, GERD, Hyperlipidemia, Sleep Apnea, Hx of CVA, Obesity, and Chronic Pain Syndrome  who presents today for c/o bleeding and bruising all over. She reports bruises are appearing on her legs and arms and she doesn't know what is causing this. We discussed the potential causes of bruising including the use of ASA and Plavix, idiopathic, senile purpura, nutrition, steroid use, and liver. We will check some labs today.     Care Team:  Pulmonary Dr. Ulysses Grove  Cardiology Dr. TONY Conte  Pain Management Dr. 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/2020    COPD (chronic obstructive pulmonary disease)     Coronary artery disease     acute MI 02/2014    CRF (chronic renal failure)     GERD (gastroesophageal reflux disease)     Hyperlipidemia     Hypertension     Myocardial infarction     Nonrheumatic mitral (valve) insufficiency 05/21/2018    Sleep apnea 01/06/2020    Stroke     Vitamin D deficiency 07/31/2020       Past Surgical History:   Procedure Laterality Date    BACK SURGERY  2009    CORONARY ANGIOPLASTY WITH STENT PLACEMENT  02/07/2014    HYSTERECTOMY  1996    LAPAROSCOPIC CHOLECYSTECTOMY N/A 03/22/2024    Procedure: CHOLECYSTECTOMY, LAPAROSCOPIC;  Surgeon: Preston Bender MD;  Location: [facility] OR;  Service: General;  Laterality: N/A;  Lysis of adhesions    LAPAROSCOPIC LYSIS OF ADHESIONS N/A 03/22/2024    Procedure: LYSIS, ADHESIONS, LAPAROSCOPIC;  Surgeon: Preston Bender MD;  Location: [facility] OR;  Service: General;  Laterality: N/A;    RADIOFREQUENCY ABLATION Right 10/13/2021    Procedure: RADIOFREQUENCY ABLATION;  Surgeon: David Combs MD;  Location: [facility] PAIN [facility];  Service: Pain Management;  Laterality: Right;  Right SI RFTC       Social History     Tobacco Use    Smoking status: Every Day     Current packs/day: 1.00     Average packs/day: 1 pack/day for 38.5 years (38.5 ttl pk-yrs)     Types: Cigarettes     Start date: 1986     Passive exposure: Current    Smokeless tobacco: Never    Tobacco comments:     Smoker since age 16. Never had a CT of chest   Substance Use Topics    Alcohol use: Not Currently     Comment: social    Drug use: Never         I personally reviewed all past medical, surgical, and social.     Review of Systems   HENT:  Positive for rhinorrhea and trouble swallowing ("at times I do but not always"). Negative for congestion and sore throat.    Respiratory:  Positive for cough (smoker since age 16) and shortness of breath (with exertion).    Cardiovascular:  " "Negative for chest pain.   Gastrointestinal:  Negative for abdominal pain, constipation and diarrhea.   Genitourinary:  Negative for dysuria, frequency and urgency.   Musculoskeletal:  Positive for arthralgias (hip, right lower extremities), back pain and myalgias.   Neurological:  Positive for dizziness ("I do have dizziness when I stand up") and headaches ("I do have headaches almost every day").   Hematological:  Bruises/bleeds easily.   Psychiatric/Behavioral:  Negative for dysphoric mood. The patient is nervous/anxious.         MEDICATIONS / ALLERGIES / HM     Current Outpatient Medications   Medication Sig Dispense Refill    albuterol (PROVENTIL/VENTOLIN HFA) 90 mcg/actuation inhaler Inhale 2 puffs into the lungs every 6 (six) hours as needed for Wheezing. INHALE TWO PUFFS BY MOUTH TWICE DAILY FOR SHORTNESS OF BREATH / not more THAN FOUR TIMES DAILY 18 g 11    ascorbic acid, vitamin C, (VITAMIN C) 500 MG tablet 500 mg.      aspirin (ECOTRIN) 81 MG EC tablet Take 81 mg by mouth once daily.      budesonide-formoterol 160-4.5 mcg (SYMBICORT) 160-4.5 mcg/actuation HFAA Inhale 2 puffs into the lungs every 12 (twelve) hours. Controller 10.2 g 5    cetirizine (ZYRTEC) 10 MG tablet Take 1 tablet (10 mg total) by mouth once daily. 30 tablet 5    cholecalciferol, vitamin D3, (VITAMIN D3) 250 mcg (10,000 unit) Cap TAKE ONE CAPSULE BY MOUTH ONE TIME DAILY 90 capsule 0    ezetimibe (ZETIA) 10 mg tablet Take 10 mg by mouth once daily.      fluticasone propionate (FLONASE) 50 mcg/actuation nasal spray 1 spray (50 mcg total) by Each Nostril route once daily. 15 g 2    gabapentin (NEURONTIN) 600 MG tablet Take 1 tablet (600 mg total) by mouth 3 (three) times daily. 90 tablet 5    HYDROcodone-acetaminophen (NORCO) 7.5-325 mg per tablet Take 1 tablet by mouth every 6 (six) hours as needed for Pain. 15 tablet 0    hydrOXYzine pamoate (VISTARIL) 25 MG Cap Take 1 capsule (25 mg total) by mouth every 8 (eight) hours as needed " (anxiety). 30 capsule 5    labetaloL (NORMODYNE) 200 MG tablet Take 200 mg by mouth 2 (two) times daily.      multivitamin/iron/folic acid (CENTRUM COMPLETE ORAL) Take by mouth Daily.      mv-mn/FA/bl coh/isoflav/jujube (ESTROVEN MENOPAUSE ORAL) Take by mouth once daily.      nitroGLYCERIN (NITROSTAT) 0.4 MG SL tablet Place 1 tablet (0.4 mg total) under the tongue every 5 (five) minutes as needed for Chest pain. 25 tablet 1    REPATHA SURECLICK 140 mg/mL PnIj Inject 140 mg into the skin every 14 (fourteen) days.      tiZANidine (ZANAFLEX) 4 MG tablet Take 1 tablet (4 mg total) by mouth every 6 (six) hours as needed (muscle strain). 30 tablet 1    amLODIPine (NORVASC) 10 MG tablet Take 1 tablet (10 mg total) by mouth once daily. 30 tablet 5    clopidogreL (PLAVIX) 75 mg tablet Take 1 tablet (75 mg total) by mouth once daily. 30 tablet 5    DULoxetine (CYMBALTA) 60 MG capsule Take 1 capsule (60 mg total) by mouth once daily. 30 capsule 5    losartan (COZAAR) 50 MG tablet Take 1 tablet (50 mg total) by mouth once daily. 30 tablet 5    pantoprazole (PROTONIX) 40 MG tablet Take 1 tablet (40 mg total) by mouth 2 (two) times daily. 60 tablet 5     No current facility-administered medications for this visit.       Review of patient's allergies indicates:   Allergen Reactions    Atorvastatin      LIPITOR    Dog dander      Dog hair & epithelia    House dust mite      D. Ptery, D. Farinae    Levsin [hyoscyamine sulfate]     Nuts [tree nut]      BRAZIL NUTS    Pravastatin     Sulfa (sulfonamide antibiotics)     Weed pollen-short ragweed        Immunization History   Administered Date(s) Administered    Influenza - Quadrivalent - PF *Preferred* (6 months and older) 12/08/2021    PPD Test 01/10/2024        Health Maintenance   Topic Date Due    Hepatitis C Screening  Never done-declined    TETANUS VACCINE  Never done-declined     High Dose Statin  Never done-unable to tolerate    Shingles Vaccine (1 of 2) Never done-declined      Mammogram  07/23/2022-MAYTE sent to [facility]    LDCT Lung Screen  06/24/2025    Lipid Panel  10/04/2028    Colorectal Cancer Screening  04/25/2032        Physical Exam      Physical Exam  Constitutional:       Appearance: She is obese.   HENT:      Head: Normocephalic.      Right Ear: External ear normal.      Left Ear: External ear normal.   Cardiovascular:      Rate and Rhythm: Normal rate and regular rhythm.      Pulses: Normal pulses.      Heart sounds: Normal heart sounds.   Pulmonary:      Effort: Pulmonary effort is normal.      Breath sounds: Normal breath sounds.   Abdominal:      Palpations: Abdomen is soft.      Tenderness: There is no abdominal tenderness.   Skin:     Findings: Bruising (bilateral upper extremities) present.   Neurological:      Mental Status: She is alert and oriented to person, place, and time.   Psychiatric:         Mood and Affect: Mood normal.         Behavior: Behavior normal.          Laboratory:    Lab Results   Component Value Date     (H) 02/29/2024     02/29/2024    K 3.9 02/29/2024     02/29/2024    CO2 25 02/29/2024    BUN 13 02/29/2024    CREATININE 2.04 (H) 02/29/2024    CALCIUM 9.6 02/29/2024    PROT 7.0 02/29/2024    ALBUMIN 3.8 02/29/2024    BILITOT 0.2 02/29/2024    ALKPHOS 118 (H) 02/29/2024    AST 20 02/29/2024    ALT 23 02/29/2024    ANIONGAP 16 02/29/2024    EGFRNONAA 34 (L) 08/05/2022       Lab Results   Component Value Date    WBC 8.95 02/29/2024    RBC 4.55 02/29/2024    HGB 14.3 02/29/2024    HCT 44.5 02/29/2024    MCV 97.8 (H) 02/29/2024    RDW 12.9 02/29/2024     02/29/2024        Lab Results   Component Value Date    CHOL 129 10/04/2023    TRIG 228 (H) 10/04/2023    HDL 53 10/04/2023    LDLCALC 30 10/04/2023       Lab Results   Component Value Date    TSH 1.680 08/05/2022       Lab Results   Component Value Date    HGBA1C 5.5 05/23/2023    ESTIMATEDAVG 97 05/23/2023        Lab Results   Component Value Date    USVVXDKW36 288 12/12/2023        Lab Results   Component Value Date    XBGZUANQ39JB 68.3 12/12/2023       Point Of Care Testing:    WBC, UA   Date Value Ref Range Status   02/26/2024 neg  Final     Nitrites, UA   Date Value Ref Range Status   02/29/2024 Positive (A) Negative Final     Urobilinogen, UA   Date Value Ref Range Status   02/29/2024 0.2 0.2, 1.0, Normal mg/dL Final     Protein, POC   Date Value Ref Range Status   02/26/2024 neg  Final     pH, UA   Date Value Ref Range Status   02/29/2024 5.5 5.0 to 8.0 pH Units Final     Blood, UA   Date Value Ref Range Status   02/26/2024 neg  Final     Specific Gravity, UA   Date Value Ref Range Status   02/29/2024 >=1.030 (A) <=1.005, 1.010, 1.015, 1.020, 1.025, 1.030 Final     Ketones, UA   Date Value Ref Range Status   02/29/2024 Negative Negative mg/dL Final     Bilirubin, POC   Date Value Ref Range Status   02/26/2024 neg  Final     Glucose, UA   Date Value Ref Range Status   02/26/2024 neg  Final       Lab Results   Component Value Date    FLUAMOLEC Negative 05/28/2024    FLUBMOLEC Negative 05/28/2024       Assessment/Plan     Spontaneous bruising  -     CBC Auto Differential; Future; Expected date: 07/18/2024  -     Vitamin B12; Future; Expected date: 07/18/2024  -     Comprehensive Metabolic Panel; Future; Expected date: 07/18/2024  -     Discussed the potential causes of bruising including the use of ASA and Plavix, idiopathic, senile purpura, nutrition, steroid use, and liver    Weight loss, unintentional  -     06/24/2024 224 lbs 07/18/2024 207 lbs  -     colonoscopy is up-to-date  -     06/24/2024 LDCT Lung-RADS Category: 2 - Benign Appearance or Behavior - continue annual screening with LDCT in 12 months   -     TSH; Future; Expected date: 07/18/2024  -     Comprehensive Metabolic Panel; Future; Expected date: 07/18/2024  -     will request mammogram from [facility], order repeat if overdue     Gastroesophageal reflux disease, unspecified whether esophagitis present  -     pantoprazole  (PROTONIX) 40 MG tablet; Take 1 tablet (40 mg total) by mouth 2 (two) times daily.  Dispense: 60 tablet; Refill: 5    Essential hypertension  -     amLODIPine (NORVASC) 10 MG tablet; Take 1 tablet (10 mg total) by mouth once daily.  Dispense: 30 tablet; Refill: 5  -     losartan (COZAAR) 50 MG tablet; Take 1 tablet (50 mg total) by mouth once daily.  Dispense: 30 tablet; Refill: 5  -     TSH; Future; Expected date: 07/18/2024    Depression with Anxiety   -     DULoxetine (CYMBALTA) 60 MG capsule; Take 1 capsule (60 mg total) by mouth once daily.  Dispense: 30 capsule; Refill: 5    Tobacco use        -     encouraged cessation     History of Stroke   -     clopidogreL (PLAVIX) 75 mg tablet; Take 1 tablet (75 mg total) by mouth once daily.  Dispense: 30 tablet; Refill: 5         Future Appointments   Date Time Provider Department Center   8/7/2024  2:40 PM Ugo Chambers MD [facility]   12/18/2024 11:00 AM Denice Calhoun NP [facility]   7/21/2025  2:20 PM Denice Calhoun NP [facility]       Workup results were reviewed and all questions were answered. Diagnosis and treatment options were discussed and the patient  is amenable with the overall treatment plan. Verbal and written discharge instructions were given including to return to clinic/ED with any acute worsening of symptoms or failure of symptoms to improve. The reasons for return to the clinic/ED were explained in lay terms. No further intervention is warranted at this time. The patient agrees with the plan, expresses understanding, is hemodynamically stable and in no acute distress.     All questions answered to desired level of satisfaction          BC Montano-BC [facility]

## 2024-07-22 ENCOUNTER — PATIENT OUTREACH (OUTPATIENT)
Facility: HOSPITAL | Age: 54
End: 2024-07-22
Payer: COMMERCIAL

## 2024-07-22 NOTE — LETTER
AUTHORIZATION FOR RELEASE OF   CONFIDENTIAL INFORMATION    Dear [facility],    We are seeing Kirti Morris, date of birth 1970, in the clinic at [facility]. Denice Calhoun NP is the patient's PCP. Kirti Morris has an outstanding lab/procedure at the time we reviewed her chart. In order to help keep her health information updated, she has authorized us to request the following medical record(s):        ( x )  MAMMOGRAM                                     [facility] fax [facility] to [facility], Moore, Rhonda, NP, [phone]     If you have any questions, please contact Matthew Manriquez at [phone].           Patient Name: Kirti Morris  : 1970  Patient Phone #: [phone]

## 2024-07-23 ENCOUNTER — TELEPHONE (OUTPATIENT)
Dept: PULMONOLOGY | Facility: CLINIC | Age: 54
End: 2024-07-23
Payer: COMMERCIAL

## 2024-07-23 ENCOUNTER — PATIENT MESSAGE (OUTPATIENT)
Dept: FAMILY MEDICINE | Facility: CLINIC | Age: 54
End: 2024-07-23
Payer: COMMERCIAL

## 2024-07-23 NOTE — TELEPHONE ENCOUNTER
----- Message from Ulysses Grove MD sent at 7/23/2024  3:01 PM CDT -----  Please call the patient and reschedule her to come and see me in the next 6-8 weeks, she may have missed her prior appointment.  Also please call and let her know that on her recent CT chest performed 6/24/24 there was evidence of the following :     Very small 4 mm or less noncalcified lung nodules.  Recommend CT scan in 1 year time.

## 2024-08-07 ENCOUNTER — PATIENT OUTREACH (OUTPATIENT)
Facility: HOSPITAL | Age: 54
End: 2024-08-07
Payer: COMMERCIAL

## 2024-08-12 ENCOUNTER — TELEPHONE (OUTPATIENT)
Dept: FAMILY MEDICINE | Facility: CLINIC | Age: 54
End: 2024-08-12
Payer: COMMERCIAL

## 2024-08-12 NOTE — TELEPHONE ENCOUNTER
----- Message from Sigrid Cortez sent at 8/12/2024  1:04 PM CDT -----  tiZANidine (ZANAFLEX) 4 MG tablet    [facility] [place] [address] [place] [address]  Phone: [phone] Fax: [phone]    Patient requests refill

## 2024-08-12 NOTE — TELEPHONE ENCOUNTER
Patient presents for an OB visit for NST.    Patient would like communication of their results via:      Lizbeth  Patient's current [facility] status: Active.    She will be seeing pain management

## 2024-08-12 NOTE — TELEPHONE ENCOUNTER
SPOKE WITH HER concerning this medication and to let her know that she had refills and since she was seeing dr haney and that he had previously written this prescription

## 2024-08-12 NOTE — TELEPHONE ENCOUNTER
----- Message from Sigrid Cortez sent at 8/12/2024  2:35 PM CDT -----  gabapentin (NEURONTIN) 600 MG tablet    [facility] [place] - [address] [place] [address]  Phone: [phone] Fax: [phone]    Patient requests refill  Phone [phone]    Requests call back about pain doctor

## 2024-08-26 ENCOUNTER — OFFICE VISIT (OUTPATIENT)
Dept: FAMILY MEDICINE | Facility: CLINIC | Age: 54
End: 2024-08-26
Payer: COMMERCIAL

## 2024-08-26 VITALS
RESPIRATION RATE: 14 BRPM | SYSTOLIC BLOOD PRESSURE: 103 MMHG | TEMPERATURE: 98 F | BODY MASS INDEX: 34.49 KG/M2 | OXYGEN SATURATION: 98 % | HEART RATE: 73 BPM | DIASTOLIC BLOOD PRESSURE: 58 MMHG | WEIGHT: 207 LBS | HEIGHT: 65 IN

## 2024-08-26 DIAGNOSIS — E78.2 MIXED HYPERLIPIDEMIA: Chronic | ICD-10-CM

## 2024-08-26 DIAGNOSIS — G89.4 CHRONIC PAIN SYNDROME: Chronic | ICD-10-CM

## 2024-08-26 DIAGNOSIS — I10 ESSENTIAL HYPERTENSION: Chronic | ICD-10-CM

## 2024-08-26 DIAGNOSIS — F41.9 ANXIETY: Chronic | ICD-10-CM

## 2024-08-26 DIAGNOSIS — R05.1 ACUTE COUGH: ICD-10-CM

## 2024-08-26 DIAGNOSIS — M47.817 LUMBOSACRAL SPONDYLOSIS WITHOUT MYELOPATHY: Chronic | ICD-10-CM

## 2024-08-26 DIAGNOSIS — J41.1 MUCOPURULENT CHRONIC BRONCHITIS: Chronic | ICD-10-CM

## 2024-08-26 DIAGNOSIS — J44.1 COPD WITH ACUTE EXACERBATION: Primary | ICD-10-CM

## 2024-08-26 DIAGNOSIS — M54.17 LUMBOSACRAL RADICULOPATHY: Chronic | ICD-10-CM

## 2024-08-26 DIAGNOSIS — I25.10 CORONARY ARTERY DISEASE INVOLVING NATIVE CORONARY ARTERY OF NATIVE HEART WITHOUT ANGINA PECTORIS: Chronic | ICD-10-CM

## 2024-08-26 PROBLEM — J31.0 CHRONIC RHINITIS: Chronic | Status: ACTIVE | Noted: 2023-08-23

## 2024-08-26 PROBLEM — E53.8 VITAMIN B12 DEFICIENCY: Chronic | Status: ACTIVE | Noted: 2022-08-04

## 2024-08-26 PROBLEM — N95.1 MENOPAUSAL SYMPTOMS: Chronic | Status: ACTIVE | Noted: 2023-03-16

## 2024-08-26 PROBLEM — M54.50 CHRONIC LEFT-SIDED LOW BACK PAIN WITHOUT SCIATICA: Chronic | Status: ACTIVE | Noted: 2022-08-04

## 2024-08-26 PROBLEM — J30.2 SEASONAL ALLERGIES: Chronic | Status: ACTIVE | Noted: 2023-12-13

## 2024-08-26 PROBLEM — G89.29 CHRONIC LEFT-SIDED LOW BACK PAIN WITHOUT SCIATICA: Chronic | Status: ACTIVE | Noted: 2022-08-04

## 2024-08-26 PROBLEM — J44.9 CHRONIC OBSTRUCTIVE PULMONARY DISEASE: Chronic | Status: ACTIVE | Noted: 2021-09-08

## 2024-08-26 PROBLEM — F32.9 REACTIVE DEPRESSION (SITUATIONAL): Chronic | Status: ACTIVE | Noted: 2023-02-21

## 2024-08-26 PROBLEM — K21.9 GASTROESOPHAGEAL REFLUX DISEASE WITHOUT ESOPHAGITIS: Chronic | Status: ACTIVE | Noted: 2023-08-23

## 2024-08-26 PROBLEM — E55.9 VITAMIN D DEFICIENCY: Chronic | Status: ACTIVE | Noted: 2022-08-04

## 2024-08-26 PROBLEM — R53.82 CHRONIC FATIGUE: Chronic | Status: ACTIVE | Noted: 2022-08-04

## 2024-08-26 PROBLEM — G47.33 OSA ON CPAP: Chronic | Status: ACTIVE | Noted: 2021-06-17

## 2024-08-26 LAB
CTP QC/QA: YES
SARS-COV-2 RDRP RESP QL NAA+PROBE: NEGATIVE

## 2024-08-26 PROCEDURE — 99214 OFFICE O/P EST MOD 30 MIN: CPT | Mod: 25,,, | Performed by: FAMILY MEDICINE

## 2024-08-26 PROCEDURE — 3008F BODY MASS INDEX DOCD: CPT | Mod: CPTII,,, | Performed by: FAMILY MEDICINE

## 2024-08-26 PROCEDURE — 1159F MED LIST DOCD IN RCRD: CPT | Mod: CPTII,,, | Performed by: FAMILY MEDICINE

## 2024-08-26 PROCEDURE — 4010F ACE/ARB THERAPY RXD/TAKEN: CPT | Mod: CPTII,,, | Performed by: FAMILY MEDICINE

## 2024-08-26 PROCEDURE — 87635 SARS-COV-2 COVID-19 AMP PRB: CPT | Mod: QW,,, | Performed by: FAMILY MEDICINE

## 2024-08-26 PROCEDURE — 96372 THER/PROPH/DIAG INJ SC/IM: CPT | Mod: ,,, | Performed by: FAMILY MEDICINE

## 2024-08-26 PROCEDURE — 3074F SYST BP LT 130 MM HG: CPT | Mod: CPTII,,, | Performed by: FAMILY MEDICINE

## 2024-08-26 PROCEDURE — 3078F DIAST BP <80 MM HG: CPT | Mod: CPTII,,, | Performed by: FAMILY MEDICINE

## 2024-08-26 PROCEDURE — 1160F RVW MEDS BY RX/DR IN RCRD: CPT | Mod: CPTII,,, | Performed by: FAMILY MEDICINE

## 2024-08-26 RX ORDER — DEXAMETHASONE SODIUM PHOSPHATE 4 MG/ML
4 INJECTION, SOLUTION INTRA-ARTICULAR; INTRALESIONAL; INTRAMUSCULAR; INTRAVENOUS; SOFT TISSUE
Status: COMPLETED | OUTPATIENT
Start: 2024-08-26 | End: 2024-08-26

## 2024-08-26 RX ORDER — DEXTROMETHORPHAN HBR, PHENYLEPHRINE HCL, PYRILAMINE MALEATE 7.5; 5; 12.5 MG/5ML; MG/5ML; MG/5ML
5 SYRUP ORAL 3 TIMES DAILY PRN
Qty: 120 ML | Refills: 0 | Status: SHIPPED | OUTPATIENT
Start: 2024-08-26

## 2024-08-26 RX ORDER — CEFUROXIME AXETIL 500 MG/1
500 TABLET ORAL EVERY 12 HOURS
Qty: 14 TABLET | Refills: 0 | Status: SHIPPED | OUTPATIENT
Start: 2024-08-26

## 2024-08-26 RX ORDER — IBUPROFEN 800 MG/1
800 TABLET ORAL EVERY 6 HOURS PRN
COMMUNITY
Start: 2024-08-13

## 2024-08-26 RX ORDER — AMOXICILLIN 500 MG/1
500 CAPSULE ORAL 3 TIMES DAILY
COMMUNITY
Start: 2024-08-13

## 2024-08-26 RX ORDER — CEFTRIAXONE 1 G/1
1 INJECTION, POWDER, FOR SOLUTION INTRAMUSCULAR; INTRAVENOUS
Status: COMPLETED | OUTPATIENT
Start: 2024-08-26 | End: 2024-08-26

## 2024-08-26 RX ADMIN — CEFTRIAXONE 1 G: 1 INJECTION, POWDER, FOR SOLUTION INTRAMUSCULAR; INTRAVENOUS at 03:08

## 2024-08-26 RX ADMIN — DEXAMETHASONE SODIUM PHOSPHATE 4 MG: 4 INJECTION, SOLUTION INTRA-ARTICULAR; INTRALESIONAL; INTRAMUSCULAR; INTRAVENOUS; SOFT TISSUE at 03:08

## 2024-08-26 NOTE — PROGRESS NOTES
Chad Baker MD    [address] [place] [address]     PATIENT NAME: Kirti Morris  : 1970  DATE: 24  MRN: [id number]      Billing Provider: Chad Baker MD  Level of Service: MT OFFICE/OUTPT VISIT, EST, LEVL IV, 30-39 MIN  Patient PCP Information       Provider PCP Type    Denice Calhoun NP General            Reason for Visit / Chief Complaint: Establish Care (Pt is here to establish care. She was being seen at [facility] but is tired of having to see different providers. She is wanting to establish with you and you take over her medication refills. ) and Nasal Congestion (Pt is also having some nasal congestion, sore throat, and a ear ache on her right side. This has been going on since yesterday but woke up worse this morning. She has been taking an amoxicillin that she started due to dental work done on . )       Update PCP  Update Chief Complaint         History of Present Illness / Problem Focused Workflow     Kirti Morris presents to the clinic with Establish Care (Pt is here to establish care. She was being seen at [facility] but is tired of having to see different providers. She is wanting to establish with you and you take over her medication refills. ) and Nasal Congestion (Pt is also having some nasal congestion, sore throat, and a ear ache on her right side. This has been going on since yesterday but woke up worse this morning. She has been taking an amoxicillin that she started due to dental work done on . )     Dr. Grove - Pulmonology   Dr. Conte - Cardiology   Dr. Combs - Pain Management      She has trouble getting in to see Dr. Combs    GI problems - was scheduled with Dr. Frye    Sinus congestion, drainage, cough, started in the last 24-36 hours, a lot of drainage and pressure, some congestion as well, has a deviated septum, right side of nostril stays swollen, she takes OTC zyrtec at night. Steroids by  mouth have made her sick in the past especially medrol dosepak, she has albuterol inhalers and symbicort some of the time     Cymbalta - started a couple years ago, seems to be doing decent with it, uses as needed vistaril     GERD - protonix does help     Chronic pain - a disc shattered in 2009, did not get much treatment at that time due to no insurance, plan is to have an injection in her back on 09/10/2024    4 heart stents, last one over 4 years       HPI    Review of Systems     Review of Systems   Constitutional:  Negative for activity change, appetite change, chills, fatigue and fever.   HENT:  Positive for nasal congestion, ear pain, rhinorrhea and sinus pressure/congestion. Negative for hearing loss, postnasal drip and sore throat.    Respiratory:  Positive for cough. Negative for chest tightness, shortness of breath and wheezing.    Cardiovascular:  Negative for chest pain, palpitations, leg swelling and claudication.   Gastrointestinal:  Negative for abdominal pain, change in bowel habit, constipation, diarrhea, nausea and vomiting.   Genitourinary:  Negative for dysuria.   Musculoskeletal:  Negative for arthralgias, back pain, gait problem and myalgias.   Integumentary:  Negative for rash.   Neurological:  Negative for weakness and headaches.   Psychiatric/Behavioral:  Negative for suicidal ideas. The patient is not nervous/anxious.         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    COPD (chronic obstructive pulmonary disease)     Coronary artery disease     acute MI 02/2014    CRF (chronic renal failure)     GERD (gastroesophageal reflux disease)     Hyperlipidemia     Hypertension     Myocardial infarction     Nonrheumatic mitral (valve) insufficiency 05/21/2018    Sleep apnea 01/06/2020    Stroke     Vitamin D  deficiency 07/31/2020       Past Surgical History:   Procedure Laterality Date    BACK SURGERY  2009    CORONARY ANGIOPLASTY WITH STENT PLACEMENT  02/07/2014    HYSTERECTOMY  1996    LAPAROSCOPIC CHOLECYSTECTOMY N/A 03/22/2024    Procedure: CHOLECYSTECTOMY, LAPAROSCOPIC;  Surgeon: Preston Bender MD;  Location: [facility] OR;  Service: General;  Laterality: N/A;  Lysis of adhesions    LAPAROSCOPIC LYSIS OF ADHESIONS N/A 03/22/2024    Procedure: LYSIS, ADHESIONS, LAPAROSCOPIC;  Surgeon: Preston Bender MD;  Location: [facility] OR;  Service: General;  Laterality: N/A;    RADIOFREQUENCY ABLATION Right 10/13/2021    Procedure: RADIOFREQUENCY ABLATION;  Surgeon: David Combs MD;  Location: [facility] PAIN MGMT;  Service: Pain Management;  Laterality: Right;  Right SI RFTC       Social History  Ms.  reports that she has been smoking cigarettes. She started smoking about 38 years ago. She has a 38.7 pack-year smoking history. She has been exposed to tobacco smoke. She has never used smokeless tobacco. She reports that she does not currently use alcohol. She reports that she does not use drugs.    Family History  Ms.'s family history includes Arthritis in her mother; Cancer in her father; Ear disease in her father; Ovarian cancer in her maternal aunt.    Medications and Allergies     Medications  Outpatient Medications Marked as Taking for the 8/26/24 encounter (Office Visit) with Chad Baker MD   Medication Sig Dispense Refill    albuterol (PROVENTIL/VENTOLIN HFA) 90 mcg/actuation inhaler Inhale 2 puffs into the lungs every 6 (six) hours as needed for Wheezing. INHALE TWO PUFFS BY MOUTH TWICE DAILY FOR SHORTNESS OF BREATH / not more THAN FOUR TIMES DAILY 18 g 11    amLODIPine (NORVASC) 10 MG tablet Take 1 tablet (10 mg total) by mouth once daily. 30 tablet 5    amoxicillin (AMOXIL) 500 MG capsule Take 500 mg by mouth 3 (three) times daily.      ascorbic acid, vitamin C, (VITAMIN C) 500 MG tablet 500 mg.       aspirin (ECOTRIN) 81 MG EC tablet Take 81 mg by mouth once daily.      budesonide-formoterol 160-4.5 mcg (SYMBICORT) 160-4.5 mcg/actuation HFAA Inhale 2 puffs into the lungs every 12 (twelve) hours. Controller 10.2 g 5    cetirizine (ZYRTEC) 10 MG tablet Take 1 tablet (10 mg total) by mouth once daily. 30 tablet 5    cholecalciferol, vitamin D3, (VITAMIN D3) 250 mcg (10,000 unit) Cap TAKE ONE CAPSULE BY MOUTH ONE TIME DAILY 90 capsule 0    clopidogreL (PLAVIX) 75 mg tablet Take 1 tablet (75 mg total) by mouth once daily. 30 tablet 5    DULoxetine (CYMBALTA) 60 MG capsule Take 1 capsule (60 mg total) by mouth once daily. 30 capsule 5    ezetimibe (ZETIA) 10 mg tablet Take 10 mg by mouth once daily.      fluticasone propionate (FLONASE) 50 mcg/actuation nasal spray 1 spray (50 mcg total) by Each Nostril route once daily. 15 g 2    gabapentin (NEURONTIN) 600 MG tablet Take 1 tablet (600 mg total) by mouth 3 (three) times daily. 90 tablet 5    HYDROcodone-acetaminophen (NORCO) 7.5-325 mg per tablet Take 1 tablet by mouth every 6 (six) hours as needed for Pain. 15 tablet 0    hydrOXYzine pamoate (VISTARIL) 25 MG Cap Take 1 capsule (25 mg total) by mouth every 8 (eight) hours as needed (anxiety). 30 capsule 5    ibuprofen (ADVIL,MOTRIN) 800 MG tablet Take 800 mg by mouth every 6 (six) hours as needed.      labetaloL (NORMODYNE) 200 MG tablet Take 200 mg by mouth 2 (two) times daily.      losartan (COZAAR) 50 MG tablet Take 1 tablet (50 mg total) by mouth once daily. 30 tablet 5    multivitamin/iron/folic acid (CENTRUM COMPLETE ORAL) Take by mouth Daily.      mv-mn/FA/bl coh/isoflav/jujube (ESTROVEN MENOPAUSE ORAL) Take by mouth once daily.      nitroGLYCERIN (NITROSTAT) 0.4 MG SL tablet Place 1 tablet (0.4 mg total) under the tongue every 5 (five) minutes as needed for Chest pain. 25 tablet 1    pantoprazole (PROTONIX) 40 MG tablet Take 1 tablet (40 mg total) by mouth 2 (two) times daily. 60 tablet 5    REPATHA  SURECLICK 140 mg/mL PnIj Inject 140 mg into the skin every 14 (fourteen) days.      tiZANidine (ZANAFLEX) 4 MG tablet Take 1 tablet (4 mg total) by mouth every 6 (six) hours as needed (muscle strain). 30 tablet 1     Current Facility-Administered Medications for the 8/26/24 encounter (Office Visit) with Chad Baker MD   Medication Dose Route Frequency Provider Last Rate Last Admin    [COMPLETED] cefTRIAXone injection 1 g  1 g Intramuscular 1 time in Clinic/HOD Chad Baker MD   1 g at 08/26/24 1541    [COMPLETED] dexAMETHasone injection 4 mg  4 mg Intramuscular 1 time in Clinic/HOD Chad Baker MD   4 mg at 08/26/24 1541       Allergies  Review of patient's allergies indicates:   Allergen Reactions    Atorvastatin      LIPITOR    Dog dander      Dog hair & epithelia    House dust mite      D. Ptery, D. Farinae    Levsin [hyoscyamine sulfate]     Nuts [tree nut]      BRAZIL NUTS    Pravastatin     Sulfa (sulfonamide antibiotics)     Weed pollen-short ragweed        Physical Examination     Vitals:    08/26/24 1414   BP: (!) 103/58   Pulse: 73   Resp: 14   Temp: 98.3 °F (36.8 °C)     Physical Exam  Constitutional:       Appearance: Normal appearance.   HENT:      Head: Normocephalic and atraumatic.   Eyes:      Extraocular Movements: Extraocular movements intact.   Cardiovascular:      Rate and Rhythm: Normal rate and regular rhythm.   Pulmonary:      Effort: Pulmonary effort is normal.      Breath sounds: Normal breath sounds.   Skin:     General: Skin is warm.   Neurological:      Mental Status: She is alert and oriented to person, place, and time. Mental status is at baseline.            Assessment and Plan (including Health Maintenance)      Problem List  Smart Sets  Document Outside HM   :    Plan:     Ms. Morris and her  are very pleasant people, enjoyed our visit today.     COPD - ideally would stop smoking     Acute sinus symptoms - started on ceftin today, injection of steroids,  will try to avoid oral steroids, if I do try oral steroids in the future it will be prednisone not a medrol dosepak    Chronic pain seems like it is not ideally controlled, she will follow up with Dr. Combs for her pain medications, has an injection coming up in the near future     No major changes today, it will take some time to better understand her chronic health problems and get to know her as a patient.         Health Maintenance Due   Topic Date Due    Hepatitis C Screening  Never done    Pneumococcal Vaccines (Age 0-64) (1 of 2 - PCV) Never done    HIV Screening  Never done    TETANUS VACCINE  Never done    High Dose Statin  Never done    Shingles Vaccine (1 of 2) Never done    Mammogram  07/23/2022    COVID-19 Vaccine (1 - 2023-24 season) Never done       Problem List Items Addressed This Visit          Neuro    Lumbosacral spondylosis without myelopathy (Chronic)    Chronic pain syndrome (Chronic)    Lumbosacral radiculopathy (Chronic)       Psychiatric    Anxiety (Chronic)       Pulmonary    Mucopurulent chronic bronchitis (Chronic)       Cardiac/Vascular    Essential hypertension (Chronic)    Mixed hyperlipidemia (Chronic)    Coronary artery disease involving native coronary artery of native heart without angina pectoris (Chronic)     Other Visit Diagnoses       COPD with acute exacerbation    -  Primary    Relevant Medications    cefTRIAXone injection 1 g (Completed)    dexAMETHasone injection 4 mg (Completed)    cefUROXime (CEFTIN) 500 MG tablet    pyrilamine-phenylephrine-DM (POLYTUSSIN DM,PYRILAMINE,) 12.5-5-7.5 mg/5 mL Liqd    Acute cough        Relevant Orders    POCT COVID-19 Rapid Screening (Completed)            Health Maintenance Topics with due status: Not Due       Topic Last Completion Date    Influenza Vaccine 12/08/2021    Colorectal Cancer Screening 04/25/2022    Hemoglobin A1c (Diabetic Prevention Screening) 05/23/2023    Lipid Panel 10/04/2023    LDCT Lung Screen 06/24/2024        Procedures     Future Appointments   Date Time Provider Department Center   8/28/2024 10:00 AM RESPIRATORY THERAPY, [facility] PUL FUNCTION SERVICES [facility]   8/28/2024 10:30 AM RESPIRATORY THERAPY, [facility] PULM FUNCTION SERVICES [facility]   8/28/2024 11:40 AM Ulysses Grove MD [facility]   9/5/2024  9:00 AM Ana Pollock NP [facility]   11/26/2024 10:15 AM Chad Baker MD [facility] [place]   12/18/2024 11:00 [facility]   7/21/2025  2:20 PM Denice Calhoun NP [facility] [place]        Follow up in about 3 months (around 11/26/2024) for chronic health problems, hypertension.     Signature:  Chad Baker MD  [address] [place] [address]  Phone #: [phone]  Fax #: [phone]    Date of encounter: 8/26/24    There are no Patient Instructions on file for this visit.

## 2024-08-28 ENCOUNTER — PATIENT MESSAGE (OUTPATIENT)
Dept: FAMILY MEDICINE | Facility: CLINIC | Age: 54
End: 2024-08-28
Payer: COMMERCIAL

## 2024-10-10 ENCOUNTER — OFFICE VISIT (OUTPATIENT)
Dept: GASTROENTEROLOGY | Facility: CLINIC | Age: 54
End: 2024-10-10
Payer: COMMERCIAL

## 2024-10-10 VITALS
DIASTOLIC BLOOD PRESSURE: 75 MMHG | SYSTOLIC BLOOD PRESSURE: 123 MMHG | HEART RATE: 77 BPM | BODY MASS INDEX: 35.16 KG/M2 | HEIGHT: 65 IN | WEIGHT: 211 LBS | OXYGEN SATURATION: 97 %

## 2024-10-10 DIAGNOSIS — R19.7 DIARRHEA, UNSPECIFIED TYPE: Primary | ICD-10-CM

## 2024-10-10 DIAGNOSIS — R10.84 GENERALIZED ABDOMINAL PAIN: ICD-10-CM

## 2024-10-10 PROCEDURE — 99999 PR PBB SHADOW E&M-EST. PATIENT-LVL V: CPT | Mod: PBBFAC,,,

## 2024-10-10 PROCEDURE — 4010F ACE/ARB THERAPY RXD/TAKEN: CPT | Mod: ,,,

## 2024-10-10 PROCEDURE — 99204 OFFICE O/P NEW MOD 45 MIN: CPT | Mod: S$PBB,,,

## 2024-10-10 PROCEDURE — 99215 OFFICE O/P EST HI 40 MIN: CPT | Mod: PBBFAC

## 2024-10-10 NOTE — PATIENT INSTRUCTIONS
Reflux   Avoid spicy, greasy foods  Avoid caffeine, citric acid, chocolate, peppermint, and carbonated drinks  Do not lay down within 3 hours of eating  Exercise 150 minutes per week  Increase fluid to 64 ounces daily  Avoid antiinflammatory medications such as motrin, advil, aleve, ibuprofen, and BC powder       - I recommend starting a daily fiber supplement with Citrucel or Fibercon, fiber gummy's (can purchase at your local pharmacy)    - I recommend drinking at least 60-80 ounces of water daily unless you have a medical condition that requires fluid restriction

## 2024-10-10 NOTE — PROGRESS NOTES
CC:  Diarrhea and lower abdominal pain      HPI 54 y.o. white female with a complaint of recent gallbladder removal since that time having frequent diarrhea and lower.  Patient states does not have diarrhea every day but when she does is 2-3 times a day she has diarrhea.  Patient denied any hematochezia or melena.  Patient does admit to some lower abdominal pain not only when she is having diarrhea she does have some lower abdominal pain randomly several times a week.  She does have a history of dysphagia and small hiatal hernia.  Patient most recent EGD at [facility] attempt to retain the information.  Patient states every now and then she does have difficulty swallowing a very tiny pill most part she is not having any choking food sitting in her chest.  Patient denied discussed starting a fiber supplement daily instructed patient that if this does not help with her diarrhea that she has to let us know and we will plan to see him cholestyramine Colestid to see if the house Questran with since removal of gallbladder.  Colonoscopy 2022 diverticula internal hemorrhoids grade 2 recommended repeat 10 years  Labs reviewed no anemia and no elevated liver enzymes.  Medical records reviewed. Additional history supplemented by nursing.     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    COPD (chronic obstructive pulmonary disease)     Coronary artery disease     acute MI 02/2014    CRF (chronic renal failure)     GERD (gastroesophageal reflux disease)     Hyperlipidemia     Hypertension     Myocardial infarction     Nonrheumatic mitral (valve) insufficiency 05/21/2018    Sleep apnea 01/06/2020    Stroke     Vitamin D deficiency 07/31/2020         Past Surgical History:   Procedure Laterality Date    BACK SURGERY  2009    CORONARY ANGIOPLASTY WITH STENT  "PLACEMENT  02/07/2014    HYSTERECTOMY  1996    LAPAROSCOPIC CHOLECYSTECTOMY N/A 03/22/2024    Procedure: CHOLECYSTECTOMY, LAPAROSCOPIC;  Surgeon: Preston Bender MD;  Location: [facility] [place] OR;  Service: General;  Laterality: N/A;  Lysis of adhesions    LAPAROSCOPIC LYSIS OF ADHESIONS N/A 03/22/2024    Procedure: LYSIS, ADHESIONS, LAPAROSCOPIC;  Surgeon: Preston Bender MD;  Location: [facility] [place] OR;  Service: General;  Laterality: N/A;    RADIOFREQUENCY ABLATION Right 10/13/2021    Procedure: RADIOFREQUENCY ABLATION;  Surgeon: David Combs MD;  Location: [facility] PAIN MGMT;  Service: Pain Management;  Laterality: Right;  Right SI RFTC       Social History  Social History     Tobacco Use    Smoking status: Every Day     Current packs/day: 1.00     Average packs/day: 1 pack/day for 38.8 years (38.8 ttl pk-yrs)     Types: Cigarettes     Start date: 1986     Passive exposure: Current    Smokeless tobacco: Never    Tobacco comments:     Smoker since age 16. Never had a CT of chest   Substance Use Topics    Alcohol use: Not Currently     Comment: social    Drug use: Never         Family History   Problem Relation Name Age of Onset    Arthritis Mother      Ear disease Father      Cancer Father      Ovarian cancer Maternal Aunt         Review of Systems  General ROS: negative for chills, fever or weight loss  Psychological ROS: negative for hallucination, depression or suicidal ideation  Ophthalmic ROS: negative for blurry vision, photophobia or eye pain  ENT ROS: negative for epistaxis, sore throat or rhinorrhea  Respiratory ROS: no cough, shortness of breath, or wheezing  Cardiovascular ROS: no chest pain or dyspnea on exertion  Gastrointestinal ROS: no abdominal pain, change in bowel habits, or black/ bloody stools    Physical Examination  /75   Pulse 77   Ht 5' 5" (1.651 m)   Wt 95.7 kg (211 lb)   SpO2 97%   BMI 35.11 kg/m²   General appearance: alert, cooperative, no distress  HENT: Normocephalic, " atraumatic, neck symmetrical, no nasal discharge   Eyes: conjunctivae/corneas clear, PERRL, EOM's intact  Lungs: no labored breathing, symmetric chest wall expansion bilaterally  Heart: regular rate and rhythm without rub; no displacement of the PMI   Abdomen: soft, non-tender; bowel sounds normoactive; no organomegaly    Labs:  Lab Results   Component Value Date    WBC 7.66 07/18/2024    HGB 14.9 07/18/2024    HCT 45.7 07/18/2024    MCV 97.0 (H) 07/18/2024     07/18/2024       CMP  Sodium   Date Value Ref Range Status   07/18/2024 140 136 - 145 mmol/L Final     Potassium   Date Value Ref Range Status   07/18/2024 4.1 3.5 - 5.1 mmol/L Final     Chloride   Date Value Ref Range Status   07/18/2024 112 (H) 98 - 107 mmol/L Final     CO2   Date Value Ref Range Status   07/18/2024 22 21 - 32 mmol/L Final     Glucose   Date Value Ref Range Status   07/18/2024 101 74 - 106 mg/dL Final     BUN   Date Value Ref Range Status   07/18/2024 14 7 - 18 mg/dL Final     Creatinine   Date Value Ref Range Status   07/18/2024 1.68 (H) 0.55 - 1.02 mg/dL Final     Calcium   Date Value Ref Range Status   07/18/2024 9.1 8.5 - 10.1 mg/dL Final     Total Protein   Date Value Ref Range Status   07/18/2024 7.5 6.4 - 8.2 g/dL Final     Albumin   Date Value Ref Range Status   07/18/2024 4.0 3.5 - 5.0 g/dL Final     Bilirubin, Total   Date Value Ref Range Status   07/18/2024 0.4 >0.0 - 1.2 mg/dL Final     Alk Phos   Date Value Ref Range Status   07/18/2024 116 (H) 41 - 108 U/L Final     AST   Date Value Ref Range Status   07/18/2024 18 15 - 37 U/L Final     ALT   Date Value Ref Range Status   07/18/2024 27 13 - 56 U/L Final     Anion Gap   Date Value Ref Range Status   07/18/2024 10 7 - 16 mmol/L Final     eGFR   Date Value Ref Range Status   08/05/2022 34 (L) >=60 mL/min/1.73m² Final       Imaging:  CT of the abdomen and pelvis scheduled    Independently reviewed    Assessment: Kirti Morris 55 yo WF here with:  Diarrhea  Lower abdominal  pain  History of cholecystectomy    Plan:   CT of the abdomen and pelvis scheduled for lower abdominal pain  - I recommend starting a daily fiber supplement with Citrucel or Fibercon (can purchase at your local pharmacy) this fiber does not help we will plan thin cholestyramine Colestid to help with diarrhea  - I recommend drinking at least 60-80 ounces of water daily unless you have a medical condition that requires fluid restriction  Follow-up in 3 months or sooner if needed    -Over 45 minutes total face to face time and >50% spent in counseling and coordination of care with documented content discussed    Carla Adams, FNP [facility]

## 2024-10-14 ENCOUNTER — TELEPHONE (OUTPATIENT)
Dept: PULMONOLOGY | Facility: CLINIC | Age: 54
End: 2024-10-14
Payer: COMMERCIAL

## 2024-10-14 NOTE — TELEPHONE ENCOUNTER
Called patient back Victoria had told her she could come early for her PFT and she wanted to make sure that was ok with us. Coming at 2 for PFT/6min walk then seeing us after.       ----- Message from [facility] sent at 10/14/2024  4:55 PM CDT -----  Hello, Patient is returning call she missed, she would like a call back. She also has a question about the time of her appt.    Thank You, [facility]

## 2024-10-15 ENCOUNTER — CLINICAL SUPPORT (OUTPATIENT)
Dept: PULMONOLOGY | Facility: HOSPITAL | Age: 54
End: 2024-10-15
Attending: STUDENT IN AN ORGANIZED HEALTH CARE EDUCATION/TRAINING PROGRAM
Payer: COMMERCIAL

## 2024-10-15 ENCOUNTER — OFFICE VISIT (OUTPATIENT)
Dept: PULMONOLOGY | Facility: CLINIC | Age: 54
End: 2024-10-15
Payer: COMMERCIAL

## 2024-10-15 VITALS
HEART RATE: 69 BPM | WEIGHT: 209 LBS | HEIGHT: 66 IN | OXYGEN SATURATION: 98 % | SYSTOLIC BLOOD PRESSURE: 112 MMHG | BODY MASS INDEX: 33.59 KG/M2 | RESPIRATION RATE: 16 BRPM | OXYGEN SATURATION: 95 % | DIASTOLIC BLOOD PRESSURE: 84 MMHG

## 2024-10-15 VITALS — BODY MASS INDEX: 33.59 KG/M2 | HEIGHT: 66 IN | WEIGHT: 209 LBS

## 2024-10-15 DIAGNOSIS — J40 BRONCHITIS: ICD-10-CM

## 2024-10-15 DIAGNOSIS — R06.02 SHORTNESS OF BREATH: ICD-10-CM

## 2024-10-15 DIAGNOSIS — F17.210 NICOTINE DEPENDENCE, CIGARETTES, UNCOMPLICATED: Primary | ICD-10-CM

## 2024-10-15 DIAGNOSIS — Z12.2 ENCOUNTER FOR SCREENING FOR LUNG CANCER: ICD-10-CM

## 2024-10-15 DIAGNOSIS — J32.9 SINUSITIS, UNSPECIFIED CHRONICITY, UNSPECIFIED LOCATION: ICD-10-CM

## 2024-10-15 PROCEDURE — 94618 PULMONARY STRESS TESTING: CPT

## 2024-10-15 PROCEDURE — 94060 EVALUATION OF WHEEZING: CPT

## 2024-10-15 PROCEDURE — 94729 DIFFUSING CAPACITY: CPT

## 2024-10-15 PROCEDURE — 99407 BEHAV CHNG SMOKING > 10 MIN: CPT | Mod: S$GLB,,, | Performed by: STUDENT IN AN ORGANIZED HEALTH CARE EDUCATION/TRAINING PROGRAM

## 2024-10-15 PROCEDURE — 94727 GAS DIL/WSHOT DETER LNG VOL: CPT

## 2024-10-15 PROCEDURE — 3074F SYST BP LT 130 MM HG: CPT | Mod: S$GLB,,, | Performed by: STUDENT IN AN ORGANIZED HEALTH CARE EDUCATION/TRAINING PROGRAM

## 2024-10-15 PROCEDURE — 3079F DIAST BP 80-89 MM HG: CPT | Mod: S$GLB,,, | Performed by: STUDENT IN AN ORGANIZED HEALTH CARE EDUCATION/TRAINING PROGRAM

## 2024-10-15 PROCEDURE — 99214 OFFICE O/P EST MOD 30 MIN: CPT | Mod: 25,S$GLB,, | Performed by: STUDENT IN AN ORGANIZED HEALTH CARE EDUCATION/TRAINING PROGRAM

## 2024-10-15 PROCEDURE — 3008F BODY MASS INDEX DOCD: CPT | Mod: S$GLB,,, | Performed by: STUDENT IN AN ORGANIZED HEALTH CARE EDUCATION/TRAINING PROGRAM

## 2024-10-15 PROCEDURE — 94726 PLETHYSMOGRAPHY LUNG VOLUMES: CPT

## 2024-10-15 PROCEDURE — 4010F ACE/ARB THERAPY RXD/TAKEN: CPT | Mod: S$GLB,,, | Performed by: STUDENT IN AN ORGANIZED HEALTH CARE EDUCATION/TRAINING PROGRAM

## 2024-10-15 PROCEDURE — 27100098 HC SPACER

## 2024-10-15 PROCEDURE — G0296 VISIT TO DETERM LDCT ELIG: HCPCS | Mod: S$GLB,,, | Performed by: STUDENT IN AN ORGANIZED HEALTH CARE EDUCATION/TRAINING PROGRAM

## 2024-10-15 PROCEDURE — 99999 PR PBB SHADOW E&M-EST. PATIENT-LVL V: CPT | Mod: PBBFAC,,, | Performed by: STUDENT IN AN ORGANIZED HEALTH CARE EDUCATION/TRAINING PROGRAM

## 2024-10-15 PROCEDURE — 1159F MED LIST DOCD IN RCRD: CPT | Mod: S$GLB,,, | Performed by: STUDENT IN AN ORGANIZED HEALTH CARE EDUCATION/TRAINING PROGRAM

## 2024-10-15 NOTE — PROGRESS NOTES
Patient Name: Kirti Morris   Primary Care Provider: Denice Calhoun NP   Date of Service: 10/15/2024   Reason for Referral:  shortness of breath    Chief Complaint: Follow-up (4wk follow up/CT/PFT/6MWT. No specific questions or concerns today.)      Subjective:      Kirti Morris is 54 y.o. female with  Past medical history significant for sinusitis, active smoking, who recently presents to the Pulmonary Clinic as an evaluation for shortness of breath.      Follow up clinic visit 10/15/24   In the interim sensation me she had a CT chest lung cancer screening which showed evidence of Lung-RADS category 2 with annual screening recommended in 12 months' time she had a pulmonary function testing performed today.  She also follows with Gastroenterology for diarrhea since recent cholecystectomy and has a CT abdomen pelvis scheduled for lower abdominal pain.  She is down to 1 pack of cigarettes per day from previously 5 packs of cigarettes.    Initial clinic visit 5/29/24   The patient is an active smoker of 38 years (38 pack-year history of smoking), saturating 97% on room air.  She has been dyspneic on exertion, with the occasional wheezing.  Most recently she has had an exacerbation of her underlying sinusitis and has been treated with medication (azithromycin antibiotic).  She has a prior history of strokes, on Plavix and aspirin.  She continues to smoke cigarettes and her recent efforts to quit has been unsuccessful.      Personal Diagnostics Review  I personally reviewed and interpreted the following     I reviewed her recent chest x-ray without evidence of consolidation or pneumothorax/pleural effusion.  I also reviewed her recent lab work which was performed in 2/20/24 which showed a white count which was normal at 8.95, no significant anemia with a hemoglobin of 14.3, elevated creatinine at 2.04 and sodium of 140.         Assessment and Plan      Dyspnea on exertion   Dyspnea   Sinusitis      Assessment:   Has  dyspnea with exertion, has had improvement with Symbicort.  Recent pulmonary function testing not indicative of obstructive or restrictive lung disease which is reassuring.     Plan   Continue with Symbicort, no indication to escalate to triple inhaled therapy-can switch Symbicort to p.r.n. with improvement in symptoms  Counseled to call the clinic or go to the emergency department/call 911 in the event of worsening symptoms or any other red flag signs/symptoms as explained to the patient in detail        Nicotine dependence, cigarettes   Tobacco cessation counseling      Assessment:  Patient currently smokes 1 pack per day.  We spent a significant amount of time talking about tobacco cessation, the factors that keep the patient smoking such as habit and cravings.  We also discussed in details ways to stop smoking after the patient expressed a strong desire to quit. The patient is not interested in nicotine replacement therapy at this time.  Duration of counseling approximately 11 minutes.    Plan  We will continue to monitor progress on next visit   Nicotine replacement therapy is not prescribed at this time (patches and p.r.n. lozenges)         Lung cancer screening      Assessment:  The patient meets criteria for lung cancer screening (has a greater than 20 pack-year history of smoking, is  54 years old and has not quit more than 15 years ago).  We had a discussion regarding what lung cancer screening entails and after shared decision-making, she agreed to low-dose CT scan.    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/2020    COPD (chronic obstructive pulmonary disease)     Coronary artery disease     acute MI 02/2014    CRF (chronic renal failure)     GERD (gastroesophageal reflux disease)     Hyperlipidemia     Hypertension     Myocardial infarction     Nonrheumatic mitral (valve) insufficiency 05/21/2018    Sleep apnea 01/06/2020    Stroke     Vitamin D deficiency 07/31/2020      Past Surgical History:   Procedure Laterality Date    BACK SURGERY  2009    CORONARY ANGIOPLASTY WITH STENT PLACEMENT  02/07/2014    HYSTERECTOMY  1996    LAPAROSCOPIC CHOLECYSTECTOMY N/A 03/22/2024    Procedure: CHOLECYSTECTOMY, LAPAROSCOPIC;  Surgeon: Preston Bender MD;  Location: [facility] OR;  Service: General;  Laterality: N/A;  Lysis of adhesions    LAPAROSCOPIC LYSIS OF ADHESIONS N/A 03/22/2024    Procedure: LYSIS, ADHESIONS, LAPAROSCOPIC;  Surgeon: Preston Bender MD;  Location: [facility] OR;  Service: General;  Laterality: N/A;    RADIOFREQUENCY ABLATION Right 10/13/2021    Procedure: RADIOFREQUENCY ABLATION;  Surgeon: David Combs MD;  Location: [facility] PAIN [facility];  Service: Pain Management;  Laterality: Right;  Right SI RFTC     Family History   Problem Relation Name Age of Onset    Arthritis Mother      Ear disease Father      Cancer Father      Ovarian cancer Maternal Aunt       Review of patient's allergies indicates:   Allergen Reactions    Atorvastatin      LIPITOR    Dog dander      Dog hair & epithelia    House dust mite      D. Ptery, D. Farinae    Levsin [hyoscyamine sulfate]     Nuts [tree nut]      BRAZIL NUTS    Pravastatin     Sulfa (sulfonamide antibiotics)     Weed pollen-short ragweed       Social History     Tobacco Use    Smoking status: Every Day     Current packs/day: 1.00     Average packs/day: 1 pack/day for 38.8 years (38.8 ttl pk-yrs)     Types: Cigarettes     Start date: 1986     Passive exposure: Current    Smokeless tobacco: Never    Tobacco comments:     Smoker since age 16. Never had a CT of  chest   Substance Use Topics    Alcohol use: Not Currently     Comment: social    Drug use: Never      Review of Systems       Objective:      Physical Exam  Constitutional:       General: She is not in acute distress.     Appearance: Normal appearance. She is normal weight. She is not ill-appearing or diaphoretic.   HENT:      Head: Normocephalic and atraumatic.      Nose: No congestion or rhinorrhea.      Mouth/Throat:      Mouth: Mucous membranes are moist.   Cardiovascular:      Rate and Rhythm: Normal rate and regular rhythm.      Pulses: Normal pulses.      Heart sounds: Normal heart sounds.   Pulmonary:      Effort: Pulmonary effort is normal. No respiratory distress.      Breath sounds: Normal breath sounds. No stridor. No wheezing, rhonchi or rales.   Chest:      Chest wall: No tenderness.   Abdominal:      General: Abdomen is flat.   Musculoskeletal:      Cervical back: Normal range of motion. No rigidity.      Right lower leg: No edema.      Left lower leg: No edema.   Skin:     General: Skin is warm.      Findings: No erythema.   Neurological:      General: No focal deficit present.      Mental Status: She is alert and oriented to person, place, and time. Mental status is at baseline.   Psychiatric:         Mood and Affect: Mood normal.         Behavior: Behavior normal.         Thought Content: Thought content normal.                10/15/2024     3:26 PM 10/15/2024     2:33 PM 10/15/2024     2:00 PM 10/10/2024     9:36 AM 8/26/2024     2:14 PM 7/18/2024     2:09 PM 6/24/2024    11:50 AM   Pulmonary Function Tests   FVC  3.72 liters        FVC%  108        FEV1  3.03 liters        FEV1%  111        FEF 25-75  3.16        FEF 25-75%  122        TLC (liters)  5.71 liters        TLC%  110        RV  1.99        RV%  104        DLCO (ml/mmHg sec)  17.52 ml/mmHg sec        DLCO%  82        Peak Flow  489 L/min        FiO2 (%)  21 %        SpO2 98 % 95 %  97 % 98 % 98 %    Ordering Provider   Ulysses Grove MD      "  Performing nurse/tech/RT   Victoria Benitezpatrick CRT       Diagnosis   Shortness of Breath       Height 5' 6" (1.676 m)  5' 6" (1.676 m) 5' 5" (1.651 m) 5' 5" (1.651 m) 5' 5" (1.651 m) 5' 5" (1.651 m)   Weight 94.8 kg (208 lb 15.9 oz)  94.8 kg (209 lb) 95.7 kg (211 lb) 93.9 kg (207 lb) 93.9 kg (207 lb) 99.8 kg (220 lb)   BMI (Calculated) 33.7  33.7 35.1 34.4 34.4 36.6   6MWT Status   completed without stopping       Patient Reported   No complaints       Was O2 used?   No       6MW Distance walked (feet)   861 feet       Distance walked (meters)   262.43 meters       Did patient stop?   No       Type of assistive device(s) used?   no assistive devices       Oxygen Saturation   99 %       Supplemental Oxygen   Room Air       Heart Rate   76 bpm       Blood Pressure   114/74       Shivam Dyspnea Rating    light       Oxygen Saturation   100 %       Supplemental Oxygen   Room Air       Heart Rate   104 bpm       Blood Pressure   98/77       Shivam Dyspnea Rating    very,very heavy       Recovery Time (seconds)   60 seconds       Oxygen Saturation   100 %       Supplemental Oxygen   Room Air       Heart Rate   89 bpm       Blood Pressure   123/71       Shivam Dyspnea Rating    moderate             Outpatient Encounter Medications as of 10/15/2024   Medication Sig Dispense Refill    albuterol (PROVENTIL/VENTOLIN HFA) 90 mcg/actuation inhaler Inhale 2 puffs into the lungs every 6 (six) hours as needed for Wheezing. INHALE TWO PUFFS BY MOUTH TWICE DAILY FOR SHORTNESS OF BREATH / not more THAN FOUR TIMES DAILY 18 g 11    amLODIPine (NORVASC) 10 MG tablet Take 1 tablet (10 mg total) by mouth once daily. 30 tablet 5    amoxicillin (AMOXIL) 500 MG capsule Take 500 mg by mouth 3 (three) times daily.      ascorbic acid, vitamin C, (VITAMIN C) 500 MG tablet 500 mg.      aspirin (ECOTRIN) 81 MG EC tablet Take 81 mg by mouth once daily.      budesonide-formoterol 160-4.5 mcg (SYMBICORT) 160-4.5 mcg/actuation HFAA Inhale 2 puffs into the " lungs every 12 (twelve) hours. Controller 10.2 g 5    cefUROXime (CEFTIN) 500 MG tablet Take 1 tablet (500 mg total) by mouth every 12 (twelve) hours. For INFECTION 14 tablet 0    cetirizine (ZYRTEC) 10 MG tablet Take 1 tablet (10 mg total) by mouth once daily. 30 tablet 5    cholecalciferol, vitamin D3, (VITAMIN D3) 250 mcg (10,000 unit) Cap TAKE ONE CAPSULE BY MOUTH ONE TIME DAILY 90 capsule 0    clopidogreL (PLAVIX) 75 mg tablet Take 1 tablet (75 mg total) by mouth once daily. 30 tablet 5    DULoxetine (CYMBALTA) 60 MG capsule Take 1 capsule (60 mg total) by mouth once daily. 30 capsule 5    ezetimibe (ZETIA) 10 mg tablet Take 10 mg by mouth once daily.      fluticasone propionate (FLONASE) 50 mcg/actuation nasal spray 1 spray (50 mcg total) by Each Nostril route once daily. 15 g 2    gabapentin (NEURONTIN) 600 MG tablet Take 1 tablet (600 mg total) by mouth 3 (three) times daily. 90 tablet 5    HYDROcodone-acetaminophen (NORCO) 7.5-325 mg per tablet Take 1 tablet by mouth every 6 (six) hours as needed for Pain. 15 tablet 0    hydrOXYzine pamoate (VISTARIL) 25 MG Cap Take 1 capsule (25 mg total) by mouth every 8 (eight) hours as needed (anxiety). 30 capsule 5    ibuprofen (ADVIL,MOTRIN) 800 MG tablet Take 800 mg by mouth every 6 (six) hours as needed.      labetaloL (NORMODYNE) 200 MG tablet Take 200 mg by mouth 2 (two) times daily.      losartan (COZAAR) 50 MG tablet Take 1 tablet (50 mg total) by mouth once daily. 30 tablet 5    multivitamin/iron/folic acid (CENTRUM COMPLETE ORAL) Take by mouth Daily.      mv-mn/FA/bl coh/isoflav/jujube (ESTROVEN MENOPAUSE ORAL) Take by mouth once daily.      nitroGLYCERIN (NITROSTAT) 0.4 MG SL tablet Place 1 tablet (0.4 mg total) under the tongue every 5 (five) minutes as needed for Chest pain. 25 tablet 1    pantoprazole (PROTONIX) 40 MG tablet Take 1 tablet (40 mg total) by mouth 2 (two) times daily. 60 tablet 5    pyrilamine-phenylephrine-DM (POLYTUSSIN DM,PYRILAMINE,)  12.5-5-7.5 mg/5 mL Liqd Take 5 mLs by mouth 3 (three) times daily as needed (CONGESTION and COUGH). 120 mL 0    REPATHA SURECLICK 140 mg/mL PnIj Inject 140 mg into the skin every 14 (fourteen) days.      tiZANidine (ZANAFLEX) 4 MG tablet Take 1 tablet (4 mg total) by mouth every 6 (six) hours as needed (muscle strain). 30 tablet 1     No facility-administered encounter medications on file as of 10/15/2024.       Assessment & Plan    As above                                          No orders of the defined types were placed in this encounter.

## 2024-10-18 PROCEDURE — 94729 DIFFUSING CAPACITY: CPT | Mod: 26,,, | Performed by: STUDENT IN AN ORGANIZED HEALTH CARE EDUCATION/TRAINING PROGRAM

## 2024-10-18 PROCEDURE — 94726 PLETHYSMOGRAPHY LUNG VOLUMES: CPT | Mod: 26,,, | Performed by: STUDENT IN AN ORGANIZED HEALTH CARE EDUCATION/TRAINING PROGRAM

## 2024-10-18 PROCEDURE — 94060 EVALUATION OF WHEEZING: CPT | Mod: 26,59,, | Performed by: STUDENT IN AN ORGANIZED HEALTH CARE EDUCATION/TRAINING PROGRAM

## 2024-10-18 PROCEDURE — 94618 PULMONARY STRESS TESTING: CPT | Mod: 26,,, | Performed by: STUDENT IN AN ORGANIZED HEALTH CARE EDUCATION/TRAINING PROGRAM

## 2024-10-18 NOTE — PROCEDURES
SIX MINUTE WALK DISTANCE  BASELINE VITALS  BP:  117/74  HR:  76  SPO2:  99% on room air  Modified YULIET Dyspnea Scale Score:  2  Modified YULIET Fatigue Scale Score:  4    END OF STUDY VITALS:  BP:  123/71  HR:  89  SPO2:  100% on room air  Modified YULIET Dyspnea Scale Score:  9  Modified YULIET Fatigue Scale Score:  9    Patient walked 861 ft which is 53% of expected distance  SpO2 shawn was 96 % at 2 minutes.   Oxygen was not used.      Ulysses Grove MD  Interventional Pulmonary and Critical Care  [facility]

## 2024-10-18 NOTE — PROCEDURES
PFT REPORT:  SPIROMETRY:  The pre-BD FVC is normal, 3.72 L/108 % predicted  The pre-BD FEV1 is normal, 3.03 L/111 % predicted.  The pre-BD FEV1/FVC ratio is preserved,  81/101 % predicted    The post-BD FVC is normal, 3.75 L/108 % predicted  The post-BD FEV1 is normal, 3.05 L/111 % predicted  The post-BD FEV1/FVC ratio is preserved,  81/101 % predicted    There is no significant bronchodilator effect.    LUNG VOLUMES:  The TLC is normal, 5.71 L/110 % predicted.        DIFFUSION CAPACITY:  The diffusion capacity is corrected for hemoglobin and is normal, 17.52/82 % predicted.    Interpretation:    Spirometry is normal.    Lung volumes are normal.  Diffusion capacity is normal.  The combination of above results do not suggest obstructive or restrictive lung disease.      Absence of bronchodilator response on pulmonary function tests may not always predict a clinical response to bronchodilators.    Ulysses Grove MD  Interventional Pulmonary and Critical Care  [facility]

## 2024-10-28 ENCOUNTER — TELEPHONE (OUTPATIENT)
Dept: GASTROENTEROLOGY | Facility: CLINIC | Age: 54
End: 2024-10-28
Payer: COMMERCIAL

## 2024-10-28 ENCOUNTER — HOSPITAL ENCOUNTER (OUTPATIENT)
Dept: RADIOLOGY | Facility: HOSPITAL | Age: 54
Discharge: HOME | End: 2024-10-28
Payer: COMMERCIAL

## 2024-10-28 DIAGNOSIS — R19.7 DIARRHEA, UNSPECIFIED TYPE: ICD-10-CM

## 2024-10-28 DIAGNOSIS — R10.84 GENERALIZED ABDOMINAL PAIN: ICD-10-CM

## 2024-10-28 PROCEDURE — A9698 NON-RAD CONTRAST MATERIALNOC: HCPCS

## 2024-10-28 PROCEDURE — 25500020 PHARM REV CODE 255

## 2024-10-28 PROCEDURE — 74176 CT ABD & PELVIS W/O CONTRAST: CPT | Mod: 26,,, | Performed by: RADIOLOGY

## 2024-10-28 PROCEDURE — 74176 CT ABD & PELVIS W/O CONTRAST: CPT | Mod: TC

## 2024-10-28 RX ORDER — IOPAMIDOL 755 MG/ML
100 INJECTION, SOLUTION INTRAVASCULAR
Status: DISCONTINUED | OUTPATIENT
Start: 2024-10-28 | End: 2024-10-28

## 2024-10-28 RX ADMIN — BARIUM SULFATE 1150 ML: 20 SUSPENSION ORAL at 11:10

## 2024-10-30 LAB — CREAT SERPL-MCNC: 1.9 MG/DL (ref 0.6–1.3)

## 2024-11-03 ENCOUNTER — HOSPITAL ENCOUNTER (EMERGENCY)
Facility: HOSPITAL | Age: 54
Discharge: HOME | End: 2024-11-03
Payer: COMMERCIAL

## 2024-11-03 VITALS
DIASTOLIC BLOOD PRESSURE: 87 MMHG | RESPIRATION RATE: 18 BRPM | HEIGHT: 66 IN | SYSTOLIC BLOOD PRESSURE: 140 MMHG | TEMPERATURE: 98 F | BODY MASS INDEX: 33.59 KG/M2 | OXYGEN SATURATION: 99 % | HEART RATE: 61 BPM | WEIGHT: 209 LBS

## 2024-11-03 DIAGNOSIS — T63.461A WASP STING, ACCIDENTAL OR UNINTENTIONAL, INITIAL ENCOUNTER: Primary | ICD-10-CM

## 2024-11-03 DIAGNOSIS — T63.481A LOCAL REACTION TO INSECT STING, ACCIDENTAL OR UNINTENTIONAL, INITIAL ENCOUNTER: ICD-10-CM

## 2024-11-03 PROCEDURE — 63600175 PHARM REV CODE 636 W HCPCS: Performed by: NURSE PRACTITIONER

## 2024-11-03 PROCEDURE — 99284 EMERGENCY DEPT VISIT MOD MDM: CPT | Mod: 25

## 2024-11-03 PROCEDURE — 90715 TDAP VACCINE 7 YRS/> IM: CPT | Performed by: NURSE PRACTITIONER

## 2024-11-03 PROCEDURE — 96372 THER/PROPH/DIAG INJ SC/IM: CPT | Performed by: NURSE PRACTITIONER

## 2024-11-03 PROCEDURE — 99284 EMERGENCY DEPT VISIT MOD MDM: CPT | Mod: ,,, | Performed by: NURSE PRACTITIONER

## 2024-11-03 PROCEDURE — 90471 IMMUNIZATION ADMIN: CPT | Performed by: NURSE PRACTITIONER

## 2024-11-03 RX ORDER — DEXAMETHASONE SODIUM PHOSPHATE 4 MG/ML
4 INJECTION, SOLUTION INTRA-ARTICULAR; INTRALESIONAL; INTRAMUSCULAR; INTRAVENOUS; SOFT TISSUE
Status: COMPLETED | OUTPATIENT
Start: 2024-11-03 | End: 2024-11-03

## 2024-11-03 RX ORDER — CEPHALEXIN 500 MG/1
500 CAPSULE ORAL EVERY 8 HOURS
Qty: 21 CAPSULE | Refills: 0 | Status: SHIPPED | OUTPATIENT
Start: 2024-11-03

## 2024-11-03 RX ADMIN — TETANUS TOXOID, REDUCED DIPHTHERIA TOXOID AND ACELLULAR PERTUSSIS VACCINE, ADSORBED 0.5 ML: 5; 2.5; 8; 8; 2.5 SUSPENSION INTRAMUSCULAR at 07:11

## 2024-11-03 RX ADMIN — DEXAMETHASONE SODIUM PHOSPHATE 4 MG: 4 INJECTION, SOLUTION INTRA-ARTICULAR; INTRALESIONAL; INTRAMUSCULAR; INTRAVENOUS; SOFT TISSUE at 07:11

## 2024-11-04 NOTE — ED PROVIDER NOTES
Encounter Date: 11/3/2024       History     Chief Complaint   Patient presents with    Insect Bite     Pt was stung by a wasp or another stinging vector yesterday at 1000 to her left hand. She states she has taken three PO Benadryl 25 mg over the last 24 hours and her last dose was today at 1000am. She states her left hand is burning and still has mild redness and edema noted.      PT is a 53 y/o  female who presents today with c/o wasp sting with progressive swelling to her left hand. She has been taking OTC benadryl with no improvement in symptoms. She denies any previous serious reaction.     The history is provided by the patient. No  was used.   Animal Bite   The incident occurred yesterday. The incident occurred at home. She came to the ER via by private vehicle. There is an injury to the Left hand. The pain is at a severity of 7/10. It is unlikely that a foreign body is present. Pertinent negatives include no chest pain, no abdominal pain, no nausea, no vomiting, no headaches, no light-headedness and no cough. There have been no prior injuries to these areas. She is Right-handed. Her tetanus status is unknown.     Review of patient's allergies indicates:   Allergen Reactions    Atorvastatin      LIPITOR    Dog dander      Dog hair & epithelia    House dust mite      D. Ptery, D. 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    COPD (chronic obstructive pulmonary disease)     Coronary artery disease     acute MI 02/2014    CRF (chronic renal failure)     GERD (gastroesophageal reflux disease)     Hyperlipidemia     Hypertension     Myocardial infarction     Nonrheumatic  mitral (valve) insufficiency 05/21/2018    Sleep apnea 01/06/2020    Stroke     Vitamin D deficiency 07/31/2020     Past Surgical History:   Procedure Laterality Date    BACK SURGERY  2009    CHOLECYSTECTOMY      CORONARY ANGIOPLASTY WITH STENT PLACEMENT  02/07/2014    HYSTERECTOMY  1996    LAPAROSCOPIC CHOLECYSTECTOMY N/A 03/22/2024    Procedure: CHOLECYSTECTOMY, LAPAROSCOPIC;  Surgeon: Preston Bender MD;  Location: [facility] OR;  Service: General;  Laterality: N/A;  Lysis of adhesions    LAPAROSCOPIC LYSIS OF ADHESIONS N/A 03/22/2024    Procedure: LYSIS, ADHESIONS, LAPAROSCOPIC;  Surgeon: Preston Bender MD;  Location: [facility] OR;  Service: General;  Laterality: N/A;    RADIOFREQUENCY ABLATION Right 10/13/2021    Procedure: RADIOFREQUENCY ABLATION;  Surgeon: David Combs MD;  Location: [facility] PAIN [facility];  Service: Pain Management;  Laterality: Right;  Right SI RFTC     Family History   Problem Relation Name Age of Onset    Arthritis Mother      Ear disease Father      Cancer Father      Ovarian cancer Maternal Aunt       Social History     Tobacco Use    Smoking status: Every Day     Current packs/day: 1.00     Average packs/day: 1 pack/day for 38.8 years (38.8 ttl pk-yrs)     Types: Cigarettes     Start date: 1986     Passive exposure: Current    Smokeless tobacco: Never    Tobacco comments:     Smoker since age 16. Never had a CT of chest   Substance Use Topics    Alcohol use: Not Currently     Comment: social    Drug use: Never     Review of Systems   Constitutional:  Negative for activity change, appetite change, fatigue, fever and unexpected weight change.   Respiratory:  Negative for cough and shortness of breath.    Cardiovascular:  Negative for chest pain and leg swelling.   Gastrointestinal:  Negative for abdominal pain, constipation, diarrhea, nausea and vomiting.   Genitourinary:  Negative for difficulty urinating.   Skin:  Positive for wound (insect sting/bite to left hand).    Neurological:  Negative for tremors, syncope, light-headedness and headaches.   Psychiatric/Behavioral:  Negative for sleep disturbance and suicidal ideas.        Physical Exam     Initial Vitals [11/03/24 1828]   BP Pulse Resp Temp SpO2   (!) 140/87 61 18 98 °F (36.7 °C) 99 %      MAP       --         Physical Exam    Vitals reviewed.  Constitutional: Vital signs are normal. She appears well-developed and well-nourished. She is cooperative.   HENT:   Head: Normocephalic and atraumatic.   Right Ear: Hearing normal.   Left Ear: Hearing normal.   Nose: Nose normal. Mouth/Throat: Mucous membranes are normal.   Eyes: Conjunctivae and lids are normal. Right eye exhibits no nystagmus. Left eye exhibits no nystagmus.   Neck: Trachea normal.   Normal range of motion.  Cardiovascular:  Normal rate and regular rhythm.           No murmur heard.  Musculoskeletal:      Left hand: Swelling and tenderness present.        Hands:       Cervical back: Normal range of motion.     Neurological: She is alert and oriented to person, place, and time. Gait normal.   Skin: Skin is warm.   Psychiatric: She has a normal mood and affect. Her speech is normal and behavior is normal. Judgment and thought content normal. Cognition and memory are normal.         Medical Screening Exam   See Full Note    ED Course   Procedures  Labs Reviewed - No data to display       Imaging Results    None          Medications   dexAMETHasone injection 4 mg (4 mg Intramuscular Given 11/3/24 1924)   Tdap (BOOSTRIX) vaccine injection 0.5 mL (0.5 mLs Intramuscular Given 11/3/24 1922)     Medical Decision Making  PT is a 53 y/o  female who presents today with complaints of left hand pain and swelling after being stung by a wasp 1 day ago.  She has been taking OTC Benadryl with no improvement in symptoms denies any previous reaction to such things.  Uncertain whether or not her tetanus is up-to-date.    Risk  Prescription drug management.  Risk Details:  Differential diagnoses include allergic reaction, cellulitis and localized reaction to sting          Clinical Impression:   Final diagnoses:  [T63.461A] Wasp sting, accidental or unintentional, initial encounter (Primary)  [T63.481A] Local reaction to insect sting, accidental or unintentional, initial encounter        ED Disposition Condition    Discharge Stable          ED Prescriptions       Medication Sig Dispense Start Date End Date Auth. Provider    cephALEXin (KEFLEX) 500 MG capsule Take 1 capsule (500 mg total) by mouth every 8 (eight) hours. 21 capsule 11/3/2024 -- Adri Calhoun FNP          Follow-up Information       Follow up With Specialties Details Why Contact Info    Denice Calhoun NP Family Medicine, Geriatric Medicine  As needed [id number] [address] [place] [address]  [phone]               Adri Calhoun FNP  11/03/24 1942

## 2024-11-12 DIAGNOSIS — Z12.31 ENCOUNTER FOR SCREENING MAMMOGRAM FOR MALIGNANT NEOPLASM OF BREAST: Primary | ICD-10-CM

## 2024-11-18 ENCOUNTER — HOSPITAL ENCOUNTER (OUTPATIENT)
Dept: RADIOLOGY | Facility: HOSPITAL | Age: 54
Discharge: HOME | End: 2024-11-18
Attending: NURSE PRACTITIONER
Payer: COMMERCIAL

## 2024-11-18 VITALS — BODY MASS INDEX: 34.82 KG/M2 | HEIGHT: 65 IN | WEIGHT: 209 LBS

## 2024-11-18 DIAGNOSIS — Z12.31 ENCOUNTER FOR SCREENING MAMMOGRAM FOR MALIGNANT NEOPLASM OF BREAST: ICD-10-CM

## 2024-11-18 PROCEDURE — 77063 BREAST TOMOSYNTHESIS BI: CPT | Mod: TC

## 2024-11-26 ENCOUNTER — OFFICE VISIT (OUTPATIENT)
Dept: FAMILY MEDICINE | Facility: CLINIC | Age: 54
End: 2024-11-26
Payer: COMMERCIAL

## 2024-11-26 VITALS
DIASTOLIC BLOOD PRESSURE: 74 MMHG | HEART RATE: 60 BPM | HEIGHT: 65 IN | BODY MASS INDEX: 34.88 KG/M2 | WEIGHT: 209.38 LBS | RESPIRATION RATE: 18 BRPM | TEMPERATURE: 98 F | OXYGEN SATURATION: 99 % | SYSTOLIC BLOOD PRESSURE: 107 MMHG

## 2024-11-26 DIAGNOSIS — M47.817 LUMBOSACRAL SPONDYLOSIS WITHOUT MYELOPATHY: Chronic | ICD-10-CM

## 2024-11-26 DIAGNOSIS — Z23 ENCOUNTER FOR IMMUNIZATION: ICD-10-CM

## 2024-11-26 DIAGNOSIS — G89.29 CHRONIC LEFT-SIDED LOW BACK PAIN WITHOUT SCIATICA: Primary | Chronic | ICD-10-CM

## 2024-11-26 DIAGNOSIS — I10 ESSENTIAL HYPERTENSION: Chronic | ICD-10-CM

## 2024-11-26 DIAGNOSIS — M54.17 LUMBOSACRAL RADICULOPATHY: Chronic | ICD-10-CM

## 2024-11-26 DIAGNOSIS — E78.2 MIXED HYPERLIPIDEMIA: Chronic | ICD-10-CM

## 2024-11-26 DIAGNOSIS — M62.838 MUSCLE SPASM: ICD-10-CM

## 2024-11-26 DIAGNOSIS — G89.4 CHRONIC PAIN SYNDROME: Chronic | ICD-10-CM

## 2024-11-26 DIAGNOSIS — M54.50 CHRONIC LEFT-SIDED LOW BACK PAIN WITHOUT SCIATICA: Primary | Chronic | ICD-10-CM

## 2024-11-26 RX ORDER — TIZANIDINE 4 MG/1
4 TABLET ORAL EVERY 6 HOURS PRN
Qty: 90 TABLET | Refills: 5 | Status: SHIPPED | OUTPATIENT
Start: 2024-11-26

## 2024-11-26 NOTE — PROGRESS NOTES
Chad Baker MD    [address] [place] [address]     PATIENT NAME: Kirti Morris  : 1970  DATE: 24  MRN: [id number]      Billing Provider: Chad Baker MD  Level of Service: [place] OFFICE/OUTPT VISIT, EST, LEVL IV, 30-39 MIN  Patient PCP Information       Provider PCP Type    Chad Baker MD General            Reason for Visit / Chief Complaint: Follow-up (3 month follow up on chronic health problems. Went to ER on 11/3 for wasp sting on her hand. Put her on antibiotics and told her to f/u with PCP. ) and Health Maintenance (Wants to get flu vaccine today. Denies other vaccines at this time)       Update PCP  Update Chief Complaint         History of Present Illness / Problem Focused Workflow     Kirti Morris presents to the clinic with Follow-up (3 month follow up on chronic health problems. Went to ER on 11/3 for wasp sting on her hand. Put her on antibiotics and told her to f/u with PCP. ) and Health Maintenance (Wants to get flu vaccine today. Denies other vaccines at this time)     Having problems with Dr. Combs's staff, she is planning on leaving his practice     Left neck pain - happens suddenly, is severe, usually happens when she is turning her head. The pain is intense, severe pain. Lasts a few seconds then she has soreness afterwards. Not doing stretching routine                 HPI    Review of Systems     Review of Systems   Constitutional:  Negative for activity change, appetite change, chills, fatigue and fever.   HENT:  Negative for nasal congestion, ear pain, hearing loss, postnasal drip and sore throat.    Respiratory:  Negative for cough, chest tightness, shortness of breath and wheezing.    Cardiovascular:  Negative for chest pain, palpitations, leg swelling and claudication.   Gastrointestinal:  Negative for abdominal pain, change in bowel habit, constipation, diarrhea, nausea and vomiting.   Genitourinary:   Negative for dysuria.   Musculoskeletal:  Positive for arthralgias, myalgias and neck pain. Negative for back pain and gait problem.   Integumentary:  Negative for rash.   Neurological:  Negative for weakness and headaches.   Psychiatric/Behavioral:  Negative for suicidal ideas. The patient is not nervous/anxious.         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    COPD (chronic obstructive pulmonary disease)     Coronary artery disease     acute MI 02/2014    CRF (chronic renal failure)     GERD (gastroesophageal reflux disease)     Hyperlipidemia     Hypertension     Myocardial infarction     Nonrheumatic mitral (valve) insufficiency 05/21/2018    Sleep apnea 01/06/2020    Stroke     Vitamin D deficiency 07/31/2020       Past Surgical History:   Procedure Laterality Date    BACK SURGERY  2009    CHOLECYSTECTOMY      CORONARY ANGIOPLASTY WITH STENT PLACEMENT  02/07/2014    HYSTERECTOMY  1996    LAPAROSCOPIC CHOLECYSTECTOMY N/A 03/22/2024    Procedure: CHOLECYSTECTOMY, LAPAROSCOPIC;  Surgeon: Preston Bender MD;  Location: [facility] OR;  Service: General;  Laterality: N/A;  Lysis of adhesions    LAPAROSCOPIC LYSIS OF ADHESIONS N/A 03/22/2024    Procedure: LYSIS, ADHESIONS, LAPAROSCOPIC;  Surgeon: Preston Bender MD;  Location: [facility] OR;  Service: General;  Laterality: N/A;    RADIOFREQUENCY ABLATION Right 10/13/2021    Procedure: RADIOFREQUENCY ABLATION;  Surgeon: David Combs MD;  Location: [facility] PAIN MGMT;  Service: Pain Management;  Laterality: Right;  Right SI RFTC       Social History  Ms.  reports that she has been smoking cigarettes. She started smoking about 38 years ago. She has a 38.9 pack-year smoking history. She has been exposed to tobacco smoke. She has never used smokeless tobacco. She reports  that she does not currently use alcohol. She reports that she does not use drugs.    Family History  Ms.'s family history includes Arthritis in her mother; Cancer in her father; Ear disease in her father; Ovarian cancer in her maternal aunt.    Medications and Allergies     Medications  Outpatient Medications Marked as Taking for the 11/26/24 encounter (Office Visit) with Chad Baker MD   Medication Sig Dispense Refill    albuterol (PROVENTIL/VENTOLIN HFA) 90 mcg/actuation inhaler Inhale 2 puffs into the lungs every 6 (six) hours as needed for Wheezing. INHALE TWO PUFFS BY MOUTH TWICE DAILY FOR SHORTNESS OF BREATH / not more THAN FOUR TIMES DAILY 18 g 11    amLODIPine (NORVASC) 10 MG tablet Take 1 tablet (10 mg total) by mouth once daily. 30 tablet 5    ascorbic acid, vitamin C, (VITAMIN C) 500 MG tablet 500 mg.      aspirin (ECOTRIN) 81 MG EC tablet Take 81 mg by mouth once daily.      budesonide-formoterol 160-4.5 mcg (SYMBICORT) 160-4.5 mcg/actuation HFAA Inhale 2 puffs into the lungs every 12 (twelve) hours. Controller 10.2 g 5    cetirizine (ZYRTEC) 10 MG tablet Take 1 tablet (10 mg total) by mouth once daily. 30 tablet 5    cholecalciferol, vitamin D3, (VITAMIN D3) 250 mcg (10,000 unit) Cap TAKE ONE CAPSULE BY MOUTH ONE TIME DAILY 90 capsule 0    clopidogreL (PLAVIX) 75 mg tablet Take 1 tablet (75 mg total) by mouth once daily. 30 tablet 5    DULoxetine (CYMBALTA) 60 MG capsule Take 1 capsule (60 mg total) by mouth once daily. 30 capsule 5    ezetimibe (ZETIA) 10 mg tablet Take 10 mg by mouth once daily.      fluticasone propionate (FLONASE) 50 mcg/actuation nasal spray 1 spray (50 mcg total) by Each Nostril route once daily. 15 g 2    gabapentin (NEURONTIN) 600 MG tablet Take 1 tablet (600 mg total) by mouth 3 (three) times daily. 90 tablet 5    HYDROcodone-acetaminophen (NORCO) 7.5-325 mg per tablet Take 1 tablet by mouth every 6 (six) hours as needed for Pain. 15 tablet 0    hydrOXYzine pamoate  (VISTARIL) 25 MG Cap Take 1 capsule (25 mg total) by mouth every 8 (eight) hours as needed (anxiety). 30 capsule 5    labetaloL (NORMODYNE) 200 MG tablet Take 200 mg by mouth 2 (two) times daily.      losartan (COZAAR) 50 MG tablet Take 1 tablet (50 mg total) by mouth once daily. 30 tablet 5    multivitamin/iron/folic acid (CENTRUM COMPLETE ORAL) Take by mouth Daily.      mv-mn/FA/bl coh/isoflav/jujube (ESTROVEN MENOPAUSE ORAL) Take by mouth once daily.      nitroGLYCERIN (NITROSTAT) 0.4 MG SL tablet Place 1 tablet (0.4 mg total) under the tongue every 5 (five) minutes as needed for Chest pain. 25 tablet 1    pantoprazole (PROTONIX) 40 MG tablet Take 1 tablet (40 mg total) by mouth 2 (two) times daily. 60 tablet 5    REPATHA SURECLICK 140 mg/mL PnIj Inject 140 mg into the skin every 14 (fourteen) days.      [DISCONTINUED] tiZANidine (ZANAFLEX) 4 MG tablet Take 1 tablet (4 mg total) by mouth every 6 (six) hours as needed (muscle strain). 30 tablet 1     Current Facility-Administered Medications for the 11/26/24 encounter (Office Visit) with Chad Baker MD   Medication Dose Route Frequency Provider Last Rate Last Admin    [COMPLETED] influenza (Flulaval, Fluzone, Fluarix) 45 mcg/0.5 mL IM vaccine (> or = 6 mo) 0.5 mL  0.5 mL Intramuscular 1 time in Clinic/HOD Chad Baker MD   0.5 mL at 11/26/24 1036       Allergies  Review of patient's allergies indicates:   Allergen Reactions    Atorvastatin      LIPITOR    Dog dander      Dog hair & epithelia    House dust mite      D. Ptery, D. Farinae    Levsin [hyoscyamine sulfate]     Nuts [tree nut]      BRAZIL NUTS    Pravastatin     Sulfa (sulfonamide antibiotics)     Weed pollen-short ragweed        Physical Examination     Vitals:    11/26/24 1023   BP: 107/74   Pulse: 60   Resp: 18   Temp: 98.1 °F (36.7 °C)     Physical Exam  Constitutional:       Appearance: Normal appearance.   HENT:      Head: Normocephalic and atraumatic.   Eyes:      Extraocular  Movements: Extraocular movements intact.   Cardiovascular:      Rate and Rhythm: Normal rate and regular rhythm.   Pulmonary:      Effort: Pulmonary effort is normal.      Breath sounds: Normal breath sounds.   Skin:     General: Skin is warm.   Neurological:      Mental Status: She is alert and oriented to person, place, and time. Mental status is at baseline.            Assessment and Plan (including Health Maintenance)      Problem List  Smart Sets  Document Outside HM   :    Plan:     Referral to a different pain provider, Dr. Hoskins in [place]     Health [facility] Due   Topic Date Due    Hepatitis C Screening  Never done    Pneumococcal Vaccines (Age 0-64) (1 of 2 - PCV) Never done    HIV Screening  Never done    High Dose Statin  Never done    Shingles Vaccine (1 of 2) Never done    COVID-19 Vaccine (1 - 2024-25 season) Never done       Problem List Items Addressed This Visit          Neuro    Lumbosacral spondylosis without myelopathy (Chronic)    Chronic pain syndrome (Chronic)    Lumbosacral radiculopathy (Chronic)       Cardiac/Vascular    Essential hypertension (Chronic)    Mixed hyperlipidemia (Chronic)       Orthopedic    Chronic left-sided low back pain without sciatica - Primary (Chronic)    Relevant Medications    tiZANidine (ZANAFLEX) 4 MG tablet    Muscle spasm    Relevant Medications    tiZANidine (ZANAFLEX) 4 MG tablet     Other Visit Diagnoses       Encounter for immunization        Relevant Medications    influenza (Flulaval, Fluzone, Fluarix) 45 mcg/0.5 mL IM vaccine (> or = 6 mo) 0.5 mL (Completed)            Health Maintenance Topics with due status: Not Due       Topic Last Completion Date    Colorectal Cancer Screening 04/25/2022    Hemoglobin A1c (Diabetic Prevention Screening) 05/23/2023    Lipid Panel 10/04/2023    LDCT Lung Screen 06/24/2024    TETANUS VACCINE 11/03/2024    Mammogram 11/18/2024    RSV Vaccine (Age 60+ and Pregnant patients) Not Due       Procedures     Future  Appointments   Date Time Provider Department Center   1/10/2025 10:00 AM Ana Pollock NP [facility]   5/14/2025  3:00 PM [facility]   5/15/2025  1:00 PM [facility]lysses Grove [facility]   5/27/2025 10:30 AM [facility]        Follow up in about 6 months (around 5/26/2025), or if symptoms worsen or fail to improve, for chronic health problems.     Signature:  Chad Baker MD  [address] [place] [address]  Phone #: [phone]  Fax #: [phone]    Date of encounter: 11/26/24    There are no Patient Instructions on file for this visit.        Skin normal color for race, warm, dry and intact. No evidence of rash.

## 2024-11-26 NOTE — LETTER
AUTHORIZATION FOR RELEASE OF   CONFIDENTIAL INFORMATION    Dear Dr. Combs,    We are seeing Kirti Morris, date of birth 1970, in the clinic at [facility]. Chad Baker MD is the patient's PCP. Kirti Morris has an outstanding lab/procedure at the time we reviewed her chart. In order to help keep her health information updated, she has authorized us to request the following medical record(s):        (  )  MAMMOGRAM                                      (  )  COLONOSCOPY      (  )  PAP SMEAR                                          (  )  OUTSIDE LAB RESULTS     (  )  DEXA SCAN                                          (  )  EYE EXAM            (  )  FOOT EXAM                                          ( X )  ENTIRE RECORD     (  )  OUTSIDE IMMUNIZATIONS                 (  )  _______________         Please fax records to [facility], Webb, Christopher N, MD, [phone].     If you have any questions, please contact Carolynn at [phone].           Patient Name: Kirti Morris  : 1970  Patient Phone #: [phone]

## 2025-01-03 ENCOUNTER — PATIENT MESSAGE (OUTPATIENT)
Dept: GASTROENTEROLOGY | Facility: CLINIC | Age: 55
End: 2025-01-03
Payer: COMMERCIAL

## 2025-01-30 DIAGNOSIS — G89.29 CHRONIC LEFT-SIDED LOW BACK PAIN WITHOUT SCIATICA: Chronic | ICD-10-CM

## 2025-01-30 DIAGNOSIS — M54.50 CHRONIC LEFT-SIDED LOW BACK PAIN WITHOUT SCIATICA: Chronic | ICD-10-CM

## 2025-01-30 DIAGNOSIS — M62.838 MUSCLE SPASM: ICD-10-CM

## 2025-01-30 DIAGNOSIS — F41.8 DEPRESSION WITH ANXIETY: ICD-10-CM

## 2025-01-30 DIAGNOSIS — K21.9 GASTROESOPHAGEAL REFLUX DISEASE, UNSPECIFIED WHETHER ESOPHAGITIS PRESENT: ICD-10-CM

## 2025-01-30 RX ORDER — PANTOPRAZOLE SODIUM 40 MG/1
40 TABLET, DELAYED RELEASE ORAL 2 TIMES DAILY
Qty: 180 TABLET | Refills: 1 | Status: SHIPPED | OUTPATIENT
Start: 2025-01-30

## 2025-01-30 RX ORDER — HYDROCODONE BITARTRATE AND ACETAMINOPHEN 7.5; 325 MG/1; MG/1
1 TABLET ORAL EVERY 6 HOURS PRN
Qty: 15 TABLET | Refills: 0 | Status: CANCELLED | OUTPATIENT
Start: 2025-01-30

## 2025-01-30 RX ORDER — DULOXETIN HYDROCHLORIDE 60 MG/1
60 CAPSULE, DELAYED RELEASE ORAL DAILY
Qty: 90 CAPSULE | Refills: 1 | Status: SHIPPED | OUTPATIENT
Start: 2025-01-30

## 2025-01-30 RX ORDER — TIZANIDINE 4 MG/1
4 TABLET ORAL EVERY 6 HOURS PRN
Qty: 90 TABLET | Refills: 1 | Status: SHIPPED | OUTPATIENT
Start: 2025-01-30

## 2025-01-30 RX ORDER — GABAPENTIN 600 MG/1
600 TABLET ORAL 3 TIMES DAILY
Qty: 270 TABLET | Refills: 1 | Status: SHIPPED | OUTPATIENT
Start: 2025-01-30

## 2025-01-30 NOTE — TELEPHONE ENCOUNTER
Patient called asking for refill on these medications. Unsure how much you want to give her. States she does not have any pain medication either. She is still waiting on referral to pain management, I am checking on this. Told her I will let her know if you can send the pain medication or not

## 2025-01-31 ENCOUNTER — TELEPHONE (OUTPATIENT)
Dept: FAMILY MEDICINE | Facility: CLINIC | Age: 55
End: 2025-01-31
Payer: COMMERCIAL

## 2025-01-31 DIAGNOSIS — I10 ESSENTIAL HYPERTENSION: Primary | Chronic | ICD-10-CM

## 2025-01-31 DIAGNOSIS — I10 ESSENTIAL (PRIMARY) HYPERTENSION: ICD-10-CM

## 2025-01-31 RX ORDER — LABETALOL 200 MG/1
200 TABLET, FILM COATED ORAL 2 TIMES DAILY
Qty: 60 TABLET | Refills: 5 | OUTPATIENT
Start: 2025-01-31

## 2025-01-31 RX ORDER — LABETALOL 200 MG/1
200 TABLET, FILM COATED ORAL 2 TIMES DAILY
Qty: 60 TABLET | Refills: 1 | Status: SHIPPED | OUTPATIENT
Start: 2025-01-31

## 2025-01-31 NOTE — TELEPHONE ENCOUNTER
Patient called and states she has been out of labetalol a few days. States she called Dr. Conte's office 3 days ago and they did not send it. States she called again today and they were closed for the day. Patient asked if you can send some in for her since she is completely out. Please advise.

## 2025-03-14 ENCOUNTER — OFFICE VISIT (OUTPATIENT)
Dept: FAMILY MEDICINE | Facility: CLINIC | Age: 55
End: 2025-03-14
Payer: COMMERCIAL

## 2025-03-14 VITALS
DIASTOLIC BLOOD PRESSURE: 77 MMHG | TEMPERATURE: 99 F | HEART RATE: 68 BPM | SYSTOLIC BLOOD PRESSURE: 110 MMHG | WEIGHT: 205 LBS | HEIGHT: 65 IN | BODY MASS INDEX: 34.16 KG/M2 | OXYGEN SATURATION: 97 % | RESPIRATION RATE: 18 BRPM

## 2025-03-14 DIAGNOSIS — J32.9 SINUSITIS, UNSPECIFIED CHRONICITY, UNSPECIFIED LOCATION: Primary | ICD-10-CM

## 2025-03-14 DIAGNOSIS — J02.9 SORE THROAT: ICD-10-CM

## 2025-03-14 DIAGNOSIS — R05.9 COUGH, UNSPECIFIED TYPE: ICD-10-CM

## 2025-03-14 DIAGNOSIS — R09.81 NASAL CONGESTION: ICD-10-CM

## 2025-03-14 LAB
CTP QC/QA: YES
MOLECULAR STREP A: NEGATIVE
POC MOLECULAR INFLUENZA A AGN: NEGATIVE
POC MOLECULAR INFLUENZA B AGN: NEGATIVE
SARS-COV-2 RDRP RESP QL NAA+PROBE: NEGATIVE

## 2025-03-14 RX ORDER — CEFTRIAXONE 1 G/1
1 INJECTION, POWDER, FOR SOLUTION INTRAMUSCULAR; INTRAVENOUS
Status: COMPLETED | OUTPATIENT
Start: 2025-03-14 | End: 2025-03-14

## 2025-03-14 RX ORDER — DEXAMETHASONE SODIUM PHOSPHATE 4 MG/ML
4 INJECTION, SOLUTION INTRA-ARTICULAR; INTRALESIONAL; INTRAMUSCULAR; INTRAVENOUS; SOFT TISSUE
Status: COMPLETED | OUTPATIENT
Start: 2025-03-14 | End: 2025-03-14

## 2025-03-14 RX ORDER — AMOXICILLIN AND CLAVULANATE POTASSIUM 875; 125 MG/1; MG/1
1 TABLET, FILM COATED ORAL EVERY 12 HOURS
Qty: 20 TABLET | Refills: 0 | Status: SHIPPED | OUTPATIENT
Start: 2025-03-14

## 2025-03-14 RX ADMIN — CEFTRIAXONE 1 G: 1 INJECTION, POWDER, FOR SOLUTION INTRAMUSCULAR; INTRAVENOUS at 03:03

## 2025-03-14 RX ADMIN — DEXAMETHASONE SODIUM PHOSPHATE 4 MG: 4 INJECTION, SOLUTION INTRA-ARTICULAR; INTRALESIONAL; INTRAMUSCULAR; INTRAVENOUS; SOFT TISSUE at 03:03

## 2025-03-14 NOTE — ASSESSMENT & PLAN NOTE
-Rocephin 1GM IM  -Decadron 4mg IM  Augmentin complete until the antibiotics are all gone, even if you start to feel better.   -Flonase Daily  -Zertec Daily  -Use Aruna-pot at home.  -Increase fluid intake.   -Return to clinic if s/s persist or do not improve.   -Go to local ER if shortness of breath or chest pain occur.

## 2025-03-14 NOTE — PROGRESS NOTES
HPI:   Kirti Morris is a pleasant 54 y.o. patient who reports to clinic with complaints of cough (non productive), nasal congestion with runny nose, fatigue, dizziness, scratchy throat, frontal and sinus pressure. She states the pressure has been really bad in those areas for about one week. Mucous is mostly clear to a light yellow. She states her chest is sore from coughing. She states this has all been going on for about a week.                        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    COPD (chronic obstructive pulmonary disease)     Coronary artery disease     acute MI 02/2014    CRF (chronic renal failure)     GERD (gastroesophageal reflux disease)     Hyperlipidemia     Hypertension     Myocardial infarction     Nonrheumatic mitral (valve) insufficiency 05/21/2018    Sleep apnea 01/06/2020    Stroke     Vitamin D deficiency 07/31/2020       PAST SURGICAL HISTORY:   Past Surgical History:   Procedure Laterality Date    BACK SURGERY  2009    CHOLECYSTECTOMY      CORONARY ANGIOPLASTY WITH STENT PLACEMENT  02/07/2014    HYSTERECTOMY  1996    LAPAROSCOPIC CHOLECYSTECTOMY N/A 03/22/2024    Procedure: CHOLECYSTECTOMY, LAPAROSCOPIC;  Surgeon: Preston Bender MD;  Location: [facility] OR;  Service: General;  Laterality: N/A;  Lysis of adhesions    LAPAROSCOPIC LYSIS OF ADHESIONS N/A 03/22/2024    Procedure: LYSIS, ADHESIONS, LAPAROSCOPIC;  Surgeon: Preston Bender MD;  Location: [facility] OR;  Service: General;  Laterality: N/A;    RADIOFREQUENCY ABLATION Right 10/13/2021    Procedure: RADIOFREQUENCY ABLATION;  Surgeon: David Comsb MD;  Location: [facility];  Service: Pain Management;  Laterality: Right;  Right SI RFTC       MEDICATIONS:  Current Medications[1]    ALLERGIES:   Review of patient's allergies indicates:   Allergen Reactions     Atorvastatin      LIPITOR    Dog dander      Dog hair & epithelia    House dust mite      D. Ptery, D. Farinae    Levsin [hyoscyamine sulfate]     Nuts [tree nut]      BRAZIL NUTS    Pravastatin     Sulfa (sulfonamide antibiotics)     Weed pollen-short ragweed          Review of Systems   Constitutional: Negative.  Negative for activity change, chills and fever.   HENT:  Positive for nasal congestion, sinus pressure/congestion, sneezing and sore throat. Negative for drooling and nosebleeds.    Eyes: Negative.    Respiratory:  Positive for cough. Negative for chest tightness.    Cardiovascular: Negative.  Negative for chest pain and palpitations.   Gastrointestinal: Negative.    Endocrine: Negative.    Genitourinary: Negative.  Negative for difficulty urinating.   Musculoskeletal: Negative.    Integumentary:  Negative.   Allergic/Immunologic: Negative.    Neurological:  Positive for vertigo. Negative for dizziness.   Hematological: Negative.    Psychiatric/Behavioral: Negative.     All other systems reviewed and are negative.         Physical Exam  Constitutional:       General: She is not in acute distress.     Appearance: Normal appearance. She is well-developed. She is not ill-appearing.   HENT:      Head: Normocephalic and atraumatic.      Right Ear: Tympanic membrane normal. Tympanic membrane is not erythematous.      Left Ear: Tympanic membrane normal. Tympanic membrane is not erythematous.      Ears:      Comments: Left ear wax, fluid.      Nose: Nasal tenderness and congestion present.      Right Sinus: Maxillary sinus tenderness and frontal sinus tenderness present.      Left Sinus: Maxillary sinus tenderness and frontal sinus tenderness present.      Mouth/Throat:      Mouth: Mucous membranes are moist.      Pharynx: Oropharynx is clear. No posterior oropharyngeal erythema.   Cardiovascular:      Rate and Rhythm: Normal rate and regular rhythm.      Pulses: Normal pulses.      Heart sounds: Normal heart  "sounds.   Pulmonary:      Effort: Pulmonary effort is normal. No accessory muscle usage or respiratory distress.      Breath sounds: Normal breath sounds.   Abdominal:      General: Abdomen is flat. Bowel sounds are normal. There is no distension.      Palpations: Abdomen is soft.      Tenderness: There is no abdominal tenderness.   Musculoskeletal:         General: Normal range of motion.      Cervical back: Normal range of motion.   Skin:     General: Skin is warm and dry.      Capillary Refill: Capillary refill takes less than 2 seconds.   Neurological:      Mental Status: She is alert and oriented to person, place, and time. Mental status is at baseline.   Psychiatric:         Mood and Affect: Mood normal.         Speech: Speech normal.         Behavior: Behavior normal. Behavior is cooperative.         Thought Content: Thought content normal.          VITAL SIGNS:   /77 (BP Location: Left arm)   Pulse 68   Temp 98.5 °F (36.9 °C) (Oral)   Resp 18   Ht 5' 5" (1.651 m)   Wt 93 kg (205 lb)   SpO2 97%   BMI 34.11 kg/m²       ASSESSMENT/PLAN  1. Sinusitis, unspecified chronicity, unspecified location  Assessment & Plan:  -Rocephin 1GM IM  -Decadron 4mg IM  Augmentin complete until the antibiotics are all gone, even if you start to feel better.   -Flonase Daily  -Zertec Daily  -Use Aruna-pot at home.  -Increase fluid intake.   -Return to clinic if s/s persist or do not improve.   -Go to local ER if shortness of breath or chest pain occur.       Orders:  -     dexAMETHasone injection 4 mg  -     cefTRIAXone injection 1 g  -     amoxicillin-clavulanate 875-125mg (AUGMENTIN) 875-125 mg per tablet; Take 1 tablet by mouth every 12 (twelve) hours.  Dispense: 20 tablet; Refill: 0    2. Nasal congestion  Assessment & Plan:  COVID FLU and Strep neg  Increase fluid intake  Follow up with the clinic if s/s persist or become worse.     Orders:  -     POCT COVID-19 Rapid Screening  -     POCT Influenza A/B " Molecular    3. Cough, unspecified type  Assessment & Plan:  COVID FLU and Strep neg  Increase fluid intake  Start Ninja Cough as needed for cough  Follow up with the clinic if s/s persist or become worse.       Orders:  -     POCT COVID-19 Rapid Screening  -     POCT Influenza A/B Molecular  -     POCT Strep A, Molecular    4. Sore throat  Assessment & Plan:  Increase fluid intake  Rotate tylenol and Ibproufen for pain or fever.  Follow up with the clinic if s/s persist or become worse  Change Tooth Brush  Gargle warm salt water  Chloraseptic spray for sore throat.       Orders:  -     POCT COVID-19 Rapid Screening  -     POCT Influenza A/B Molecular  -     POCT Strep A, Molecular             There are no Patient Instructions on file for this visit.  Orders Placed This Encounter   Procedures    POCT COVID-19 Rapid Screening    POCT Influenza A/B Molecular    POCT Strep A, Molecular                [1]   Current Outpatient Medications:     albuterol (PROVENTIL/VENTOLIN HFA) 90 mcg/actuation inhaler, Inhale 2 puffs into the lungs every 6 (six) hours as needed for Wheezing. INHALE TWO PUFFS BY MOUTH TWICE DAILY FOR SHORTNESS OF BREATH / not more THAN FOUR TIMES DAILY, Disp: 18 g, Rfl: 11    amLODIPine (NORVASC) 10 MG tablet, Take 1 tablet (10 mg total) by mouth once daily., Disp: 30 tablet, Rfl: 5    ascorbic acid, vitamin C, (VITAMIN C) 500 MG tablet, 500 mg., Disp: , Rfl:     aspirin (ECOTRIN) 81 MG EC tablet, Take 81 mg by mouth once daily., Disp: , Rfl:     budesonide-formoterol 160-4.5 mcg (SYMBICORT) 160-4.5 mcg/actuation HFAA, Inhale 2 puffs into the lungs every 12 (twelve) hours. Controller, Disp: 10.2 g, Rfl: 5    cetirizine (ZYRTEC) 10 MG tablet, Take 1 tablet (10 mg total) by mouth once daily., Disp: 30 tablet, Rfl: 5    cholecalciferol, vitamin D3, (VITAMIN D3) 250 mcg (10,000 unit) Cap, TAKE ONE CAPSULE BY MOUTH ONE TIME DAILY, Disp: 90 capsule, Rfl: 0    clopidogreL (PLAVIX) 75 mg tablet, Take 1 tablet  (75 mg total) by mouth once daily., Disp: 30 tablet, Rfl: 5    DULoxetine (CYMBALTA) 60 MG capsule, Take 1 capsule (60 mg total) by mouth once daily., Disp: 90 capsule, Rfl: 1    ezetimibe (ZETIA) 10 mg tablet, Take 10 mg by mouth once daily., Disp: , Rfl:     fluticasone propionate (FLONASE) 50 mcg/actuation nasal spray, 1 spray (50 mcg total) by Each Nostril route once daily., Disp: 15 g, Rfl: 2    gabapentin (NEURONTIN) 600 MG tablet, Take 1 tablet (600 mg total) by mouth 3 (three) times daily. For NERVE PAIN, Disp: 270 tablet, Rfl: 1    hydrOXYzine pamoate (VISTARIL) 25 MG Cap, Take 1 capsule (25 mg total) by mouth every 8 (eight) hours as needed (anxiety)., Disp: 30 capsule, Rfl: 5    labetaloL (NORMODYNE) 200 MG tablet, Take 1 tablet (200 mg total) by mouth 2 (two) times daily. For HEART, Disp: 60 tablet, Rfl: 1    losartan (COZAAR) 50 MG tablet, Take 1 tablet (50 mg total) by mouth once daily., Disp: 30 tablet, Rfl: 5    multivitamin/iron/folic acid (CENTRUM COMPLETE ORAL), Take by mouth Daily., Disp: , Rfl:     mv-mn/FA/bl coh/isoflav/jujube (ESTROVEN MENOPAUSE ORAL), Take by mouth once daily., Disp: , Rfl:     nitroGLYCERIN (NITROSTAT) 0.4 MG SL tablet, Place 1 tablet (0.4 mg total) under the tongue every 5 (five) minutes as needed for Chest pain., Disp: 25 tablet, Rfl: 1    pantoprazole (PROTONIX) 40 MG tablet, Take 1 tablet (40 mg total) by mouth 2 (two) times daily. For GERD, Disp: 180 tablet, Rfl: 1    REPATHA SURECLICK 140 mg/mL PnIj, Inject 140 mg into the skin every 14 (fourteen) days., Disp: , Rfl:     tiZANidine (ZANAFLEX) 4 MG tablet, Take 1 tablet (4 mg total) by mouth every 6 (six) hours as needed (muscle strain)., Disp: 90 tablet, Rfl: 1    amoxicillin-clavulanate 875-125mg (AUGMENTIN) 875-125 mg per tablet, Take 1 tablet by mouth every 12 (twelve) hours., Disp: 20 tablet, Rfl: 0    HYDROcodone-acetaminophen (NORCO) 7.5-325 mg per tablet, Take 1 tablet by mouth every 6 (six) hours as needed for  Pain. (Patient not taking: Reported on 3/14/2025), Disp: 15 tablet, Rfl: 0    Current Facility-Administered Medications:     cefTRIAXone injection 1 g, 1 g, Intramuscular, 1 time in Clinic/HOD, Tonie Santoro FNP    dexAMETHasone injection 4 mg, 4 mg, Intramuscular, 1 time in Clinic/HOD, Tonei Santoro, EUGENIAP

## 2025-03-14 NOTE — ASSESSMENT & PLAN NOTE
COVID FLU and Strep neg  Increase fluid intake  Start Ninja Cough as needed for cough  Follow up with the clinic if s/s persist or become worse.

## 2025-03-14 NOTE — ASSESSMENT & PLAN NOTE
COVID FLU and Strep neg  Increase fluid intake  Follow up with the clinic if s/s persist or become worse.

## 2025-03-23 DIAGNOSIS — I10 ESSENTIAL HYPERTENSION: Chronic | ICD-10-CM

## 2025-03-24 RX ORDER — LABETALOL 200 MG/1
TABLET, FILM COATED ORAL
Qty: 180 TABLET | Refills: 0 | Status: SHIPPED | OUTPATIENT
Start: 2025-03-24

## 2025-04-04 ENCOUNTER — OFFICE VISIT (OUTPATIENT)
Dept: FAMILY MEDICINE | Facility: CLINIC | Age: 55
End: 2025-04-04
Payer: COMMERCIAL

## 2025-04-04 VITALS
RESPIRATION RATE: 18 BRPM | BODY MASS INDEX: 33.99 KG/M2 | HEART RATE: 57 BPM | TEMPERATURE: 98 F | SYSTOLIC BLOOD PRESSURE: 133 MMHG | WEIGHT: 204 LBS | OXYGEN SATURATION: 99 % | DIASTOLIC BLOOD PRESSURE: 84 MMHG | HEIGHT: 65 IN

## 2025-04-04 DIAGNOSIS — J01.01 ACUTE RECURRENT MAXILLARY SINUSITIS: ICD-10-CM

## 2025-04-04 DIAGNOSIS — M54.50 LOW BACK PAIN: ICD-10-CM

## 2025-04-04 DIAGNOSIS — J41.1 MUCOPURULENT CHRONIC BRONCHITIS: Primary | Chronic | ICD-10-CM

## 2025-04-04 DIAGNOSIS — M54.2 NECK PAIN: Primary | ICD-10-CM

## 2025-04-04 DIAGNOSIS — J31.0 CHRONIC RHINITIS: Chronic | ICD-10-CM

## 2025-04-04 DIAGNOSIS — J30.2 SEASONAL ALLERGIES: Chronic | ICD-10-CM

## 2025-04-04 RX ORDER — PREDNISONE 20 MG/1
20 TABLET ORAL DAILY PRN
Qty: 7 TABLET | Refills: 0 | Status: SHIPPED | OUTPATIENT
Start: 2025-04-04

## 2025-04-04 RX ORDER — CEFTRIAXONE 1 G/1
1 INJECTION, POWDER, FOR SOLUTION INTRAMUSCULAR; INTRAVENOUS
Status: COMPLETED | OUTPATIENT
Start: 2025-04-04 | End: 2025-04-04

## 2025-04-04 RX ORDER — METHYLPREDNISOLONE ACETATE 40 MG/ML
80 INJECTION, SUSPENSION INTRA-ARTICULAR; INTRALESIONAL; INTRAMUSCULAR; SOFT TISSUE
Status: COMPLETED | OUTPATIENT
Start: 2025-04-04 | End: 2025-04-04

## 2025-04-04 RX ORDER — AZITHROMYCIN 250 MG/1
TABLET, FILM COATED ORAL
Qty: 6 TABLET | Refills: 0 | Status: SHIPPED | OUTPATIENT
Start: 2025-04-04 | End: 2025-04-09

## 2025-04-04 RX ORDER — DEXAMETHASONE SODIUM PHOSPHATE 4 MG/ML
4 INJECTION, SOLUTION INTRA-ARTICULAR; INTRALESIONAL; INTRAMUSCULAR; INTRAVENOUS; SOFT TISSUE
Status: COMPLETED | OUTPATIENT
Start: 2025-04-04 | End: 2025-04-04

## 2025-04-04 RX ADMIN — DEXAMETHASONE SODIUM PHOSPHATE 4 MG: 4 INJECTION, SOLUTION INTRA-ARTICULAR; INTRALESIONAL; INTRAMUSCULAR; INTRAVENOUS; SOFT TISSUE at 11:04

## 2025-04-04 RX ADMIN — METHYLPREDNISOLONE ACETATE 80 MG: 40 INJECTION, SUSPENSION INTRA-ARTICULAR; INTRALESIONAL; INTRAMUSCULAR; SOFT TISSUE at 11:04

## 2025-04-04 RX ADMIN — CEFTRIAXONE 1 G: 1 INJECTION, POWDER, FOR SOLUTION INTRAMUSCULAR; INTRAVENOUS at 11:04

## 2025-04-04 NOTE — PROGRESS NOTES
Chad Baker MD    [address] [place] [address]     PATIENT NAME: Kirti Morris  : 1970  DATE: 25  MRN: [id number]      Billing Provider: Chad Baker MD  Level of Service: MO OFFICE/OUTPT VISIT, EST, LEVL IV, 30-39 MIN  Patient PCP Information       Provider PCP Type    Chad Baker MD General                   Update PCP  Update Chief Complaint         History of Present Illness / Problem Focused Workflow     Kirti Morris presents to the clinic   Chief Complaint   Patient presents with    Nasal Congestion     Nasal congestion, cough, chest hurts. States she has been sick 6-7 weeks. States when she starts coughing she gets strangled. States she can get up yellow mucous.       HPI    Review of Systems     Review of Systems   Constitutional:  Positive for fatigue. Negative for activity change, appetite change, chills and fever.   HENT:  Positive for nasal congestion, rhinorrhea, sinus pressure/congestion and sore throat. Negative for ear pain, hearing loss and postnasal drip.    Respiratory:  Positive for cough and wheezing. Negative for chest tightness and shortness of breath.    Cardiovascular:  Negative for chest pain, palpitations, leg swelling and claudication.   Gastrointestinal:  Negative for abdominal pain, change in bowel habit, constipation, diarrhea, nausea and vomiting.   Genitourinary:  Negative for dysuria.   Musculoskeletal:  Negative for arthralgias, back pain, gait problem and myalgias.   Integumentary:  Negative for rash.   Neurological:  Negative for weakness and headaches.   Psychiatric/Behavioral:  Negative for suicidal ideas. The patient is not nervous/anxious.         Medical / Social / Family History     Past Medical History:   Diagnosis Date    Angina at rest 2021    Anxiety     Arthritis     Asthma     Cerebral infarction 2021    Combined B12 and folate deficiency anemia 2020    COPD  (chronic obstructive pulmonary disease)     Coronary artery disease     acute MI 02/2014    CRF (chronic renal failure)     GERD (gastroesophageal reflux disease)     Hyperlipidemia     Hypertension     Myocardial infarction     Nonrheumatic mitral (valve) insufficiency 05/21/2018    Sleep apnea 01/06/2020    Stroke     Vitamin D deficiency 07/31/2020       Past Surgical History:   Procedure Laterality Date    BACK SURGERY  2009    CHOLECYSTECTOMY      CORONARY ANGIOPLASTY WITH STENT PLACEMENT  02/07/2014    HYSTERECTOMY  1996    LAPAROSCOPIC CHOLECYSTECTOMY N/A 03/22/2024    Procedure: CHOLECYSTECTOMY, LAPAROSCOPIC;  Surgeon: Preston Bender MD;  Location: [facility] OR;  Service: General;  Laterality: N/A;  Lysis of adhesions    LAPAROSCOPIC LYSIS OF ADHESIONS N/A 03/22/2024    Procedure: LYSIS, ADHESIONS, LAPAROSCOPIC;  Surgeon: Preston Bender MD;  Location: [facility] OR;  Service: General;  Laterality: N/A;    RADIOFREQUENCY ABLATION Right 10/13/2021    Procedure: RADIOFREQUENCY ABLATION;  Surgeon: David Combs MD;  Location: [facility] PAIN MGMT;  Service: Pain Management;  Laterality: Right;  Right SI RFTC       Social History  Ms.  reports that she has been smoking cigarettes. She started smoking about 39 years ago. She has a 39.3 pack-year smoking history. She has been exposed to tobacco smoke. She has never used smokeless tobacco. She reports that she does not currently use alcohol. She reports that she does not use drugs.    Family History  Ms.'s family history includes Arthritis in her mother; Cancer in her father; Ear disease in her father; Ovarian cancer in her maternal aunt.    Medications and Allergies     Medications  Outpatient Medications Marked as Taking for the 4/4/25 encounter (Office Visit) with Chad Baker MD   Medication Sig Dispense Refill    albuterol (PROVENTIL/VENTOLIN HFA) 90 mcg/actuation inhaler Inhale 2 puffs into the lungs every 6 (six) hours as needed for  Wheezing. INHALE TWO PUFFS BY MOUTH TWICE DAILY FOR SHORTNESS OF BREATH / not more THAN FOUR TIMES DAILY 18 g 11    amLODIPine (NORVASC) 10 MG tablet Take 1 tablet (10 mg total) by mouth once daily. 30 tablet 5    ascorbic acid, vitamin C, (VITAMIN C) 500 MG tablet 500 mg.      aspirin (ECOTRIN) 81 MG EC tablet Take 81 mg by mouth once daily.      budesonide-formoterol 160-4.5 mcg (SYMBICORT) 160-4.5 mcg/actuation HFAA Inhale 2 puffs into the lungs every 12 (twelve) hours. Controller 10.2 g 5    cetirizine (ZYRTEC) 10 MG tablet Take 1 tablet (10 mg total) by mouth once daily. 30 tablet 5    cholecalciferol, vitamin D3, (VITAMIN D3) 250 mcg (10,000 unit) Cap TAKE ONE CAPSULE BY MOUTH ONE TIME DAILY 90 capsule 0    clopidogreL (PLAVIX) 75 mg tablet Take 1 tablet (75 mg total) by mouth once daily. 30 tablet 5    DULoxetine (CYMBALTA) 60 MG capsule Take 1 capsule (60 mg total) by mouth once daily. 90 capsule 1    ezetimibe (ZETIA) 10 mg tablet Take 10 mg by mouth once daily.      fluticasone propionate (FLONASE) 50 mcg/actuation nasal spray 1 spray (50 mcg total) by Each Nostril route once daily. 15 g 2    gabapentin (NEURONTIN) 600 MG tablet Take 1 tablet (600 mg total) by mouth 3 (three) times daily. For NERVE PAIN 270 tablet 1    HYDROcodone-acetaminophen (NORCO) 7.5-325 mg per tablet Take 1 tablet by mouth every 6 (six) hours as needed for Pain. 15 tablet 0    hydrOXYzine pamoate (VISTARIL) 25 MG Cap Take 1 capsule (25 mg total) by mouth every 8 (eight) hours as needed (anxiety). 30 capsule 5    labetaloL (NORMODYNE) 200 MG tablet Take 1 tablet by mouth 2 (two) times daily. For HEART 180 tablet 0    losartan (COZAAR) 50 MG tablet Take 1 tablet (50 mg total) by mouth once daily. 30 tablet 5    multivitamin/iron/folic acid (CENTRUM COMPLETE ORAL) Take by mouth Daily.      mv-mn/FA/bl coh/isoflav/jujube (ESTROVEN MENOPAUSE ORAL) Take by mouth once daily.      nitroGLYCERIN (NITROSTAT) 0.4 MG SL tablet Place 1 tablet  (0.4 mg total) under the tongue every 5 (five) minutes as needed for Chest pain. 25 tablet 1    pantoprazole (PROTONIX) 40 MG tablet Take 1 tablet (40 mg total) by mouth 2 (two) times daily. For GERD 180 tablet 1    REPATHA SURECLICK 140 mg/mL PnIj Inject 140 mg into the skin every 14 (fourteen) days.      tiZANidine (ZANAFLEX) 4 MG tablet Take 1 tablet (4 mg total) by mouth every 6 (six) hours as needed (muscle strain). 90 tablet 1     Current Facility-Administered Medications for the 4/4/25 encounter (Office Visit) with Chad Baker MD   Medication Dose Route Frequency Provider Last Rate Last Admin    [COMPLETED] cefTRIAXone injection 1 g  1 g Intramuscular 1 time in [facility] Baker MD   1 g at 04/04/25 1128    [COMPLETED] dexAMETHasone injection 4 mg  4 mg Intramuscular 1 time in Clinic/Chad Cali MD   4 mg at 04/04/25 1127    [COMPLETED] methylPREDNISolone acetate injection 80 mg  80 mg Intramuscular 1 time in [facility] Chad Baker MD   80 mg at 04/04/25 1128       Allergies  Review of patient's allergies indicates:   Allergen Reactions    Atorvastatin      LIPITOR    Dog dander      Dog hair & epithelia    House dust mite      D. Ptery, D. Farinae    Levsin [hyoscyamine sulfate]     Nuts [tree nut]      BRAZIL NUTS    Pravastatin     Sulfa (sulfonamide antibiotics)     Weed pollen-short ragweed        Physical Examination     Vitals:    04/04/25 1046   BP: 133/84   Pulse: (!) 57   Resp: 18   Temp: 97.5 °F (36.4 °C)     Physical Exam  Vitals and nursing note reviewed.   Constitutional:       General: She is not in acute distress.     Appearance: Normal appearance. She is not ill-appearing.   HENT:      Head: Normocephalic and atraumatic.      Salivary Glands: Right salivary gland is not tender. Left salivary gland is not tender.      Right Ear: Tympanic membrane, ear canal and external ear normal.      Left Ear: Tympanic membrane, ear canal and external ear normal.       Nose: Congestion and rhinorrhea present.      Right Turbinates: Swollen.      Left Turbinates: Swollen.      Right Sinus: Maxillary sinus tenderness present. No frontal sinus tenderness.      Left Sinus: Maxillary sinus tenderness present. No frontal sinus tenderness.      Mouth/Throat:      Pharynx: Posterior oropharyngeal erythema present. No pharyngeal swelling or oropharyngeal exudate.      Tonsils: No tonsillar exudate.   Eyes:      Extraocular Movements: Extraocular movements intact.      Pupils: Pupils are equal, round, and reactive to light.   Cardiovascular:      Rate and Rhythm: Normal rate and regular rhythm.      Heart sounds: Normal heart sounds.   Pulmonary:      Effort: Pulmonary effort is normal.      Breath sounds: Wheezing present.   Abdominal:      General: Bowel sounds are normal.      Palpations: Abdomen is soft.   Musculoskeletal:         General: Normal range of motion.   Lymphadenopathy:      Cervical: No cervical adenopathy.   Skin:     General: Skin is warm.      Findings: No rash.   Neurological:      General: No focal deficit present.      Mental Status: She is alert and oriented to person, place, and time. Mental status is at baseline.   Psychiatric:         Mood and Affect: Mood normal.         Behavior: Behavior normal.            Assessment and Plan (including Health Maintenance)      Problem List  Smart Sets  Document Outside HM   :    Plan:         Health Maintenance Due   Topic Date Due    Hepatitis C Screening  Never done    HIV Screening  Never done    Pneumococcal Vaccines (Age 50+) (1 of 2 - PCV) Never done    High Dose Statin  Never done    Shingles Vaccine (1 of 2) Never done    COVID-19 Vaccine (1 - 2024-25 season) Never done       Problem List Items Addressed This Visit          ENT    Chronic rhinitis (Chronic)    Seasonal allergies (Chronic)    Sinusitis       Pulmonary    Mucopurulent chronic bronchitis - Primary (Chronic)    Relevant Medications    dexAMETHasone  injection 4 mg (Completed)    methylPREDNISolone acetate injection 80 mg (Completed)    azithromycin (Z-PADMINI) 250 MG tablet    cefTRIAXone injection 1 g (Completed)    brompheniramin-phenylephrin-DM 1-2.5-5 mg/5 mL Soln    predniSONE (DELTASONE) 20 MG tablet       Health Maintenance Topics with due status: Not Due       Topic Last Completion Date    Colorectal Cancer Screening 04/25/2022    Hemoglobin A1c (Diabetic Prevention Screening) 05/23/2023    Lipid Panel 10/04/2023    LDCT Lung Screen 06/24/2024    TETANUS VACCINE 11/03/2024    Mammogram 11/18/2024    RSV Vaccine (Age 60+ and Pregnant patients) Not Due       Procedures     Future Appointments   Date Time Provider Department Center   5/14/2025  3:00 PM [facility] CT[facility] Julisa   5/15/2025  1:00 PM Ulysses Grove MD [facility]   10/3/2025 10:30 AM Chad Baker MD [facility] AMARIS [place]        No follow-ups on file.     Signature:  Chad Baker MD  [address] [place] [address]  Phone #: [phone]  Fax #: [phone]    Date of encounter: 4/4/25    There are no Patient Instructions on file for this visit.

## 2025-05-05 ENCOUNTER — TELEPHONE (OUTPATIENT)
Dept: PULMONOLOGY | Facility: CLINIC | Age: 55
End: 2025-05-05
Payer: COMMERCIAL

## 2025-05-05 NOTE — TELEPHONE ENCOUNTER
----- Message from Cinda sent at 5/5/2025 10:45 AM CDT -----  Who Called: Kirti Toledo is requesting assistance/information from provider's office.Pt is wanting to cancel her appt that is on 05/14 for her CT scan and also cancel office visit on 05/15. Preferred Method of Contact: Phone CallPatient's Preferred Phone Number on File: [phone] Best Call Back Number, if different:Additional Information:

## 2025-05-29 ENCOUNTER — CLINICAL SUPPORT (OUTPATIENT)
Dept: REHABILITATION | Facility: HOSPITAL | Age: 55
End: 2025-05-29
Payer: COMMERCIAL

## 2025-05-29 DIAGNOSIS — M54.2 NECK PAIN: ICD-10-CM

## 2025-05-29 DIAGNOSIS — M54.50 LOW BACK PAIN: ICD-10-CM

## 2025-05-29 PROCEDURE — 97161 PT EVAL LOW COMPLEX 20 MIN: CPT

## 2025-05-30 DIAGNOSIS — K21.9 GASTROESOPHAGEAL REFLUX DISEASE, UNSPECIFIED WHETHER ESOPHAGITIS PRESENT: ICD-10-CM

## 2025-05-30 RX ORDER — PANTOPRAZOLE SODIUM 40 MG/1
40 TABLET, DELAYED RELEASE ORAL
Qty: 180 TABLET | Refills: 1 | Status: SHIPPED | OUTPATIENT
Start: 2025-05-30

## 2025-06-05 ENCOUNTER — CLINICAL SUPPORT (OUTPATIENT)
Dept: REHABILITATION | Facility: HOSPITAL | Age: 55
End: 2025-06-05
Payer: COMMERCIAL

## 2025-06-05 DIAGNOSIS — M54.2 NECK PAIN: Primary | ICD-10-CM

## 2025-06-05 PROCEDURE — 97110 THERAPEUTIC EXERCISES: CPT

## 2025-06-10 ENCOUNTER — CLINICAL SUPPORT (OUTPATIENT)
Dept: REHABILITATION | Facility: HOSPITAL | Age: 55
End: 2025-06-10
Payer: COMMERCIAL

## 2025-06-10 DIAGNOSIS — M54.2 NECK PAIN: Primary | ICD-10-CM

## 2025-06-10 PROCEDURE — 97140 MANUAL THERAPY 1/> REGIONS: CPT | Mod: CQ

## 2025-06-10 PROCEDURE — 97530 THERAPEUTIC ACTIVITIES: CPT | Mod: CQ

## 2025-06-10 PROCEDURE — 97110 THERAPEUTIC EXERCISES: CPT | Mod: CQ

## 2025-06-10 PROCEDURE — 97014 ELECTRIC STIMULATION THERAPY: CPT | Mod: CQ

## 2025-06-10 NOTE — PROGRESS NOTES
Outpatient Rehab    Physical Therapy Visit    Patient Name: Kirti Morris  MRN: [id number]  YOB: 1970  Encounter Date: 6/10/2025    Therapy Diagnosis:   Encounter Diagnosis   Name Primary?    Neck pain Yes     Physician: Katy Fofana*    Physician Orders: Eval and Treat  Medical Diagnosis: Neck pain  Low back pain  Surgical Diagnosis: Not applicable for this Episode   Surgical Date: Not applicable for this Episode    Visit # / Visits Authorized:  2 / 8  Insurance Authorization Period: 5/29/2025 to 4/5/2027  Date of Evaluation: 4/4/2025  Plan of Care Certification: 6/4/2025 to 6/27/2025      PT/PTA:   PTA  Number of PTA visits since last PT visit: 1/5  Time In: 0130   Time Out: 0236  Total Time (in minutes): 66   Total Billable Time (in minutes): 60      Subjective         Pt. States she has some very tender points, states her neck has been hurting more since the weekend, states her back pain remains the same, hurts consistently all the time.     Objective        Per Below    Treatment:  Therapeutic Exercise  TE 6: Cervical Retractions x 15, Cervical Extensions x 12, Rotations (full ROM) x 10 and Lateral flexion x 8 B with limited ROM.  TE 7: Standing Row Pull with GRN t-tube for core stabilization  TE 8: Hooklying position _ attempted Marching with TA hold- Pt. with high sensitivity to laying on hard surface due to trigger point in low back this visit.  Manual Therapy  MT 1: STM to Bilateral neck musculature after CROM exercises with trigger point releases required left left side upper to mid trap area. x 10 mins  MT 2: STM to low back with trigger point release to R side x 3 x 5 mins  Therapeutic Activity  TA 1: Instructed pt. on postural alignement for cervical and lumbar for neutral spine and safe movement patterns for strengthening postural musculature.  Modalities  Moist Heat (min): 15  Electrical Stimulation (Parameters): After clearing for contraindications, applied IFC around area  of increased pain in low back x 15 mins.    Time Entry(in minutes):  E-Stim (Unattended) Time Entry: 15  Manual Therapy Time Entry: 15  Therapeutic Activity Time Entry: 15  Therapeutic Exercise Time Entry: 15    Assessment & Plan   Assessment:  Pt. With significant pain relief reported with treatment this visit.        The patient will continue to benefit from skilled outpatient physical therapy in order to address the deficits listed in the problem list on the initial evaluation, provide patient and family education, and maximize the patients level of independence in the home and community environments.     The patient's spiritual, cultural, and educational needs were considered, and the patient is agreeable to the plan of care and goals.           Plan: Will issue HEP next visit and cont. PT POC as indicated.      Goals:   Active       Functional outcome       Patient will show a significant change (10 points or more) in FOTO patient-reported outcome tool to demonstrate subjective improvement       Start:  06/04/25    Expected End:  06/26/25               Maintaining body position       Patient will maintain standing 15 minutes without increase in pain       Start:  06/04/25    Expected End:  06/26/25               Pain       Patient will report pain of 3/10 demonstrating a reduction of overall pain       Start:  06/04/25    Expected End:  06/18/25            Patient will report a 2 point reduction in pain while performing ambulation on level ground over 500 feet       Start:  06/04/25    Expected End:  06/26/25                Latonia Mccauley, PTA

## 2025-06-12 ENCOUNTER — CLINICAL SUPPORT (OUTPATIENT)
Dept: REHABILITATION | Facility: HOSPITAL | Age: 55
End: 2025-06-12
Payer: COMMERCIAL

## 2025-06-12 DIAGNOSIS — M54.42 CHRONIC BILATERAL LOW BACK PAIN WITH SCIATICA, SCIATICA LATERALITY UNSPECIFIED: Primary | ICD-10-CM

## 2025-06-12 DIAGNOSIS — M54.41 CHRONIC BILATERAL LOW BACK PAIN WITH SCIATICA, SCIATICA LATERALITY UNSPECIFIED: Primary | ICD-10-CM

## 2025-06-12 DIAGNOSIS — G89.29 CHRONIC BILATERAL LOW BACK PAIN WITH SCIATICA, SCIATICA LATERALITY UNSPECIFIED: Primary | ICD-10-CM

## 2025-06-12 PROCEDURE — 97110 THERAPEUTIC EXERCISES: CPT

## 2025-06-12 PROCEDURE — 97014 ELECTRIC STIMULATION THERAPY: CPT

## 2025-06-12 NOTE — PROGRESS NOTES
Outpatient Rehab    Physical Therapy Visit    Patient Name: Kirti Morris  MRN: [id number]  YOB: 1970  Encounter Date: 6/12/2025    Therapy Diagnosis:   Encounter Diagnosis   Name Primary?    Chronic bilateral low back pain with sciatica, sciatica laterality unspecified Yes     Physician: Katy Fofana*    Physician Orders: Eval and Treat  Medical Diagnosis: Neck pain  Low back pain  Surgical Diagnosis: Not applicable for this Episode   Surgical Date: Not applicable for this Episode    Visit # / Visits Authorized:  3 / 8  Insurance Authorization Period: 5/29/2025 to 4/5/2027  Date of Evaluation: 4/4/2025  Plan of Care Certification: 6/4/2025 to 6/27/2025      PT/PTA: PT   Number of PTA visits since last PT visit:0  Time In: 1315   Time Out: 1400  Total Time (in minutes): 45   Total Billable Time (in minutes):  45      Subjective   Patient notes decreased pain after modalities from last visit.  Pain reported as 4/10.        Treatment:  Therapeutic Exercise  TE 1: Nu-step x5 mins  TE 2: Prone on Elbows 2 mins  TE 4: Lumbar Rotations x30 w/ ball  TE 5: DKTC w/ ball x30  TE 7: Standing Row Pull with GRN t-tube for core stabilization  TE 8: Hooklying position _ attempted Marching with TA hold- Pt. with high sensitivity to laying on hard surface due to trigger point in low back this visit.  Modalities  Moist Heat (min): 15  Electrical Stimulation (Parameters): After clearing for contraindications, applied IFC around area of increased pain in low back x 15 mins.    Time Entry(in minutes):  E-Stim (Unattended) Time Entry: 15  Therapeutic Exercise Time Entry: 30    Assessment & Plan   Assessment:    Evaluation/Treatment Tolerance: Patient tolerated treatment well    The patient will continue to benefit from skilled outpatient physical therapy in order to address the deficits listed in the problem list on the initial evaluation, provide patient and family education, and maximize the patients level  of independence in the home and community environments.     The patient's spiritual, cultural, and educational needs were considered, and the patient is agreeable to the plan of care and goals.           Plan: Continue with POC as indicated    Goals:   Active       Functional outcome       Patient will show a significant change (10 points or more) in FOTO patient-reported outcome tool to demonstrate subjective improvement       Start:  06/04/25    Expected End:  06/26/25               Maintaining body position       Patient will maintain standing 15 minutes without increase in pain       Start:  06/04/25    Expected End:  06/26/25               Pain       Patient will report pain of 3/10 demonstrating a reduction of overall pain       Start:  06/04/25    Expected End:  06/18/25            Patient will report a 2 point reduction in pain while performing ambulation on level ground over 500 feet       Start:  06/04/25    Expected End:  06/26/25                Bakari Davis PT

## 2025-06-24 ENCOUNTER — CLINICAL SUPPORT (OUTPATIENT)
Dept: REHABILITATION | Facility: HOSPITAL | Age: 55
End: 2025-06-24
Payer: COMMERCIAL

## 2025-06-24 DIAGNOSIS — M54.41 CHRONIC BILATERAL LOW BACK PAIN WITH SCIATICA, SCIATICA LATERALITY UNSPECIFIED: Primary | ICD-10-CM

## 2025-06-24 DIAGNOSIS — G89.29 CHRONIC BILATERAL LOW BACK PAIN WITH SCIATICA, SCIATICA LATERALITY UNSPECIFIED: Primary | ICD-10-CM

## 2025-06-24 DIAGNOSIS — M54.42 CHRONIC BILATERAL LOW BACK PAIN WITH SCIATICA, SCIATICA LATERALITY UNSPECIFIED: Primary | ICD-10-CM

## 2025-06-24 PROCEDURE — 97110 THERAPEUTIC EXERCISES: CPT

## 2025-06-24 NOTE — PROGRESS NOTES
Outpatient Rehab    Physical Therapy Visit    Patient Name: Kirti Morris  MRN: [id number]  YOB: 1970  Encounter Date: 6/24/2025    Therapy Diagnosis:   Encounter Diagnosis   Name Primary?    Chronic bilateral low back pain with sciatica, sciatica laterality unspecified Yes     Physician: Katy Fofana*    Physician Orders: Eval and Treat  Medical Diagnosis: Neck pain  Low back pain  Surgical Diagnosis: Not applicable for this Episode   Surgical Date: Not applicable for this Episode  Days Since Last Surgery: Not applicable for this Episode    Visit # / Visits Authorized:  4 / 8  Insurance Authorization Period: 5/29/2025 to 4/5/2027  Date of Evaluation: 4/4/2025  Plan of Care Certification: 6/4/2025 to 6/27/2025      PT/PTA: PT   Number of PTA visits since last PT visit:0  Time In: 1320   Time Out: 1400  Total Time (in minutes): 40   Total Billable Time (in minutes):  40        Subjective   Patient notes issues with irritable bowl syndrome and notes increased pain in buttocks area and bilateral shoulders.  Pain reported as 7/10.      Objective            Treatment:  Therapeutic Exercise  TE 2: Prone on Elbows 2 mins  TE 3: Bridges x40  TE 4: Lumbar Rotations x30 w/ ball  TE 5: Piriformis Stretch 3x30 BLE  TE 8: Hooklying position _ attempted Marching with TA hold- Pt. with high sensitivity to laying on hard surface due to trigger point in low back this visit.  Modalities  Moist Heat (min): 10    Time Entry(in minutes):  Therapeutic Exercise Time Entry: 40    Assessment & Plan   Assessment: Patient reports difficulty concerning pain with all interventions today.       The patient will continue to benefit from skilled outpatient physical therapy in order to address the deficits listed in the problem list on the initial evaluation, provide patient and family education, and maximize the patients level of independence in the home and community environments.     The patient's spiritual, cultural,  and educational needs were considered, and the patient is agreeable to the plan of care and goals.           Plan:      Goals:   Active       Functional outcome       Patient will show a significant change (10 points or more) in FOTO patient-reported outcome tool to demonstrate subjective improvement       Start:  06/04/25    Expected End:  06/26/25               Maintaining body position       Patient will maintain standing 15 minutes without increase in pain       Start:  06/04/25    Expected End:  06/26/25               Pain       Patient will report pain of 3/10 demonstrating a reduction of overall pain       Start:  06/04/25    Expected End:  06/18/25            Patient will report a 2 point reduction in pain while performing ambulation on level ground over 500 feet       Start:  06/04/25    Expected End:  06/26/25                Bakari Davis PT

## 2025-06-26 ENCOUNTER — CLINICAL SUPPORT (OUTPATIENT)
Dept: REHABILITATION | Facility: HOSPITAL | Age: 55
End: 2025-06-26
Payer: COMMERCIAL

## 2025-06-26 DIAGNOSIS — M54.41 CHRONIC BILATERAL LOW BACK PAIN WITH SCIATICA, SCIATICA LATERALITY UNSPECIFIED: ICD-10-CM

## 2025-06-26 DIAGNOSIS — M47.817 LUMBOSACRAL SPONDYLOSIS WITHOUT MYELOPATHY: Primary | Chronic | ICD-10-CM

## 2025-06-26 DIAGNOSIS — G89.29 CHRONIC BILATERAL LOW BACK PAIN WITH SCIATICA, SCIATICA LATERALITY UNSPECIFIED: ICD-10-CM

## 2025-06-26 DIAGNOSIS — M54.42 CHRONIC BILATERAL LOW BACK PAIN WITH SCIATICA, SCIATICA LATERALITY UNSPECIFIED: ICD-10-CM

## 2025-06-26 DIAGNOSIS — M54.2 NECK PAIN: ICD-10-CM

## 2025-06-26 PROCEDURE — 97530 THERAPEUTIC ACTIVITIES: CPT | Mod: CQ

## 2025-06-26 PROCEDURE — 97014 ELECTRIC STIMULATION THERAPY: CPT | Mod: CQ

## 2025-06-26 PROCEDURE — 97110 THERAPEUTIC EXERCISES: CPT | Mod: CQ

## 2025-06-26 NOTE — PROGRESS NOTES
Outpatient Rehab    Physical Therapy Visit    Patient Name: Kirti Morris  MRN: [id number]  YOB: 1970  Encounter Date: 6/26/2025    Therapy Diagnosis:   Encounter Diagnoses   Name Primary?    Lumbosacral spondylosis without myelopathy Yes    Chronic bilateral low back pain with sciatica, sciatica laterality unspecified     Neck pain      Physician: Katy Fofana*    Physician Orders: Eval and Treat  Medical Diagnosis: Neck pain  Low back pain  Surgical Diagnosis: Not applicable for this Episode   Surgical Date: Not applicable for this Episode  Days Since Last Surgery: Not applicable for this Episode    Visit # / Visits Authorized:  5 / 8  Insurance Authorization Period: 5/29/2025 to 4/5/2027  Date of Evaluation: 4/4/2025  Plan of Care Certification: 6/4/2025 to 6/27/2025      PT/PTA: PTA   Number of PTA visits since last PT visit:1  Time In: 1315   Time Out: 1400  Total Time (in minutes): 45   Total Billable Time (in minutes): 45    Subjective  Pt. states thoracic spine is bothering her more today with catching between and below shoulder blades.      Pain reported as 5/10.      Objective        Pt. Participated in tx. Per below to improve core stability and reduce pain with structural positioning and modalities as indicated.     Treatment:  Therapeutic Exercise  TE 3: Bridges 2 x 10  (with partial pelvic tilt)  TE: LTR  TE: ISO Hip ABD/ ADD with belt/ ball x 3 mins    Therapeutic Activities:   TA: SKC stretches  TA: prone lying   Modalities  Moist Heat (min): 15  Electrical Stimulation (Parameters): After clearing for contraindications, applied IFC around area of increased pain in low back x 15 mins.        Assessment & Plan   Assessment: Pt. With no increased pain during tx. States the IFC helped her pain a lot.   Evaluation/Treatment Tolerance: Patient tolerated treatment well    The patient will continue to benefit from skilled outpatient physical therapy in order to address the deficits  listed in the problem list on the initial evaluation, provide patient and family education, and maximize the patients level of independence in the home and community environments.     The patient's spiritual, cultural, and educational needs were considered, and the patient is agreeable to the plan of care and goals.           Plan: Will cont. with focus on core strength and pain control.    Goals:   Active       Functional outcome       Patient will show a significant change (10 points or more) in FOTO patient-reported outcome tool to demonstrate subjective improvement       Start:  06/04/25    Expected End:  06/26/25               Maintaining body position       Patient will maintain standing 15 minutes without increase in pain       Start:  06/04/25    Expected End:  06/26/25               Pain       Patient will report pain of 3/10 demonstrating a reduction of overall pain       Start:  06/04/25    Expected End:  06/18/25            Patient will report a 2 point reduction in pain while performing ambulation on level ground over 500 feet       Start:  06/04/25    Expected End:  06/26/25                Latonia Mccauley, PTA

## 2025-07-07 ENCOUNTER — CLINICAL SUPPORT (OUTPATIENT)
Dept: REHABILITATION | Facility: HOSPITAL | Age: 55
End: 2025-07-07
Payer: COMMERCIAL

## 2025-07-07 DIAGNOSIS — M54.42 CHRONIC BILATERAL LOW BACK PAIN WITH SCIATICA, SCIATICA LATERALITY UNSPECIFIED: ICD-10-CM

## 2025-07-07 DIAGNOSIS — M54.41 CHRONIC BILATERAL LOW BACK PAIN WITH SCIATICA, SCIATICA LATERALITY UNSPECIFIED: ICD-10-CM

## 2025-07-07 DIAGNOSIS — M47.817 LUMBOSACRAL SPONDYLOSIS WITHOUT MYELOPATHY: Primary | ICD-10-CM

## 2025-07-07 DIAGNOSIS — G89.29 CHRONIC BILATERAL LOW BACK PAIN WITH SCIATICA, SCIATICA LATERALITY UNSPECIFIED: ICD-10-CM

## 2025-07-07 PROCEDURE — 97110 THERAPEUTIC EXERCISES: CPT | Mod: CQ

## 2025-07-07 PROCEDURE — 97530 THERAPEUTIC ACTIVITIES: CPT | Mod: CQ

## 2025-07-07 NOTE — PROGRESS NOTES
Outpatient Rehab    Physical Therapy Visit    Patient Name: Kirti Morris  MRN: [id number]  YOB: 1970  Encounter Date: 7/7/2025    Therapy Diagnosis:   Encounter Diagnoses   Name Primary?    Lumbosacral spondylosis without myelopathy Yes    Chronic bilateral low back pain with sciatica, sciatica laterality unspecified      Physician: Katy Fofana*    Physician Orders: Eval and Treat  Medical Diagnosis: Neck pain  Low back pain  Surgical Diagnosis: Not applicable for this Episode   Surgical Date: Not applicable for this Episode  Days Since Last Surgery: Not applicable for this Episode    Visit # / Visits Authorized:  6 / 8  Insurance Authorization Period: 5/29/2025 to 4/5/2027  Date of Evaluation: 3/2/2023  4/4/2025  Plan of Care Certification: 6/4/2025 to 6/27/2025      PT/PTA: PTA   Number of PTA visits since last PT visit:2  Time In: 1317   Time Out: 1420  Total Time (in minutes): 63   Total Billable Time (in minutes): 45      Subjective   Pt. states she is having a much better day today. Pt. called last week to cancel her appointment due to increased pain in neck, back and B LEs. 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         Objective        Pt. Participated in tx. Per below with no increased pain reported.    Treatment:  Therapeutic Exercise  TE 1: Nu-step x 13 mins  TE 3: Bridges 3 x 10  (with partial pelvic tilt)  TE 4: Hamstring Rolls x30 w/ ball  TA:  Cervical Retractions x 15, Cervical Extensions x 12, Rotations (full ROM) x 10 and Lateral flexion x 12 B with improved ROM  TE 7: Standing Row Pull with GRN t-tube x 15 with Purple T-tube x 15 reps for core stabilization  TE 8: Hooklying position: Marching with TA hold with 2# bilateral  TE 9: Hooklying : Kicks with 2# bilaeral with TA holds x 25 each B alternating           Assessment & Plan   Assessment: Pt. With improved tolerance to tx. Noted, decreased pain complaints noted throughout this  visit. Tx. Focus on core stability and CROM with no c/o increased pain.   Evaluation/Treatment Tolerance: Patient tolerated treatment well    The patient will continue to benefit from skilled outpatient physical therapy in order to address the deficits listed in the problem list on the initial evaluation, provide patient and family education, and maximize the patients level of independence in the home and community environments.     The patient's spiritual, cultural, and educational needs were considered, and the patient is agreeable to the plan of care and goals.           Plan: Will cont. with focus on core strength and pain control.    Goals:   Active       Functional outcome       Patient will show a significant change (10 points or more) in FOTO patient-reported outcome tool to demonstrate subjective improvement       Start:  06/04/25    Expected End:  06/26/25               Maintaining body position       Patient will maintain standing 15 minutes without increase in pain       Start:  06/04/25    Expected End:  06/26/25               Pain       Patient will report pain of 3/10 demonstrating a reduction of overall pain       Start:  06/04/25    Expected End:  06/18/25            Patient will report a 2 point reduction in pain while performing ambulation on level ground over 500 feet       Start:  06/04/25    Expected End:  06/26/25                Latonia Mccauley PTA

## 2025-07-14 ENCOUNTER — OFFICE VISIT (OUTPATIENT)
Dept: FAMILY MEDICINE | Facility: CLINIC | Age: 55
End: 2025-07-14
Payer: COMMERCIAL

## 2025-07-14 VITALS
WEIGHT: 210.38 LBS | TEMPERATURE: 97 F | BODY MASS INDEX: 35.05 KG/M2 | HEART RATE: 68 BPM | OXYGEN SATURATION: 98 % | SYSTOLIC BLOOD PRESSURE: 103 MMHG | DIASTOLIC BLOOD PRESSURE: 70 MMHG | HEIGHT: 65 IN | RESPIRATION RATE: 16 BRPM

## 2025-07-14 DIAGNOSIS — J41.1 MUCOPURULENT CHRONIC BRONCHITIS: Chronic | ICD-10-CM

## 2025-07-14 DIAGNOSIS — M24.9 HYPERMOBILITY OF JOINT: Primary | ICD-10-CM

## 2025-07-14 DIAGNOSIS — I10 ESSENTIAL HYPERTENSION: Chronic | ICD-10-CM

## 2025-07-14 DIAGNOSIS — E78.2 MIXED HYPERLIPIDEMIA: Chronic | ICD-10-CM

## 2025-07-14 DIAGNOSIS — I25.10 CORONARY ARTERY DISEASE INVOLVING NATIVE CORONARY ARTERY OF NATIVE HEART WITHOUT ANGINA PECTORIS: Chronic | ICD-10-CM

## 2025-07-14 PROCEDURE — 99213 OFFICE O/P EST LOW 20 MIN: CPT | Mod: ,,, | Performed by: FAMILY MEDICINE

## 2025-07-14 PROCEDURE — 3078F DIAST BP <80 MM HG: CPT | Mod: ,,, | Performed by: FAMILY MEDICINE

## 2025-07-14 PROCEDURE — 3008F BODY MASS INDEX DOCD: CPT | Mod: ,,, | Performed by: FAMILY MEDICINE

## 2025-07-14 PROCEDURE — 3074F SYST BP LT 130 MM HG: CPT | Mod: ,,, | Performed by: FAMILY MEDICINE

## 2025-07-14 PROCEDURE — 4010F ACE/ARB THERAPY RXD/TAKEN: CPT | Mod: ,,, | Performed by: FAMILY MEDICINE

## 2025-07-14 PROCEDURE — 1160F RVW MEDS BY RX/DR IN RCRD: CPT | Mod: ,,, | Performed by: FAMILY MEDICINE

## 2025-07-14 PROCEDURE — 1159F MED LIST DOCD IN RCRD: CPT | Mod: ,,, | Performed by: FAMILY MEDICINE

## 2025-07-14 NOTE — PROGRESS NOTES
Chad Baker MD    [address] [place] [address]     PATIENT NAME: Kirti Morris  : 1970  DATE: 25  MRN: [id number]      Billing Provider: Chad Baker MD  Level of Service: MI OFFICE/OUTPT VISIT, EST, LEVL III, 20-29 MIN  Patient PCP Information       Provider PCP Type    Chad Baker MD General                   Update PCP  Update Chief Complaint         History of Present Illness / Problem Focused Workflow     Kirti Morris presents to the clinic with   Chief Complaint   Patient presents with    Follow-up     Pain Management told her to follow up with Dr. Baker about Kristy Danlos.       She has a lot of pain issues. Grew up with pain in muscles and joints and hurt all the time. She has read up on this and thinks a lot of the symptoms are consistent with her history and that she may have a form of ehlors danlos syndrome     HPI    Review of Systems     Review of Systems   Constitutional:  Negative for activity change, appetite change, chills, fatigue and fever.   HENT:  Negative for nasal congestion, ear pain, hearing loss, postnasal drip and sore throat.    Respiratory:  Negative for cough, chest tightness, shortness of breath and wheezing.    Cardiovascular:  Negative for chest pain, palpitations, leg swelling and claudication.   Gastrointestinal:  Negative for abdominal pain, change in bowel habit, constipation, diarrhea, nausea and vomiting.   Genitourinary:  Negative for dysuria.   Musculoskeletal:  Positive for arthralgias, back pain, gait problem, leg pain and myalgias.   Integumentary:  Negative for rash.   Neurological:  Negative for weakness and headaches.   Psychiatric/Behavioral:  Negative for suicidal ideas. The patient is not nervous/anxious.         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    COPD (chronic obstructive pulmonary disease)     Coronary artery disease     acute MI 02/2014    CRF (chronic renal failure)     GERD (gastroesophageal reflux disease)     Hyperlipidemia     Hypertension     Myocardial infarction     Nonrheumatic mitral (valve) insufficiency 05/21/2018    Sleep apnea 01/06/2020    Stroke     Vitamin D deficiency 07/31/2020       Past Surgical History:   Procedure Laterality Date    BACK SURGERY  2009    CHOLECYSTECTOMY      CORONARY ANGIOPLASTY WITH STENT PLACEMENT  02/07/2014    HYSTERECTOMY  1996    LAPAROSCOPIC CHOLECYSTECTOMY N/A 03/22/2024    Procedure: CHOLECYSTECTOMY, LAPAROSCOPIC;  Surgeon: Preston Bender MD;  Location: [facility] OR;  Service: General;  Laterality: N/A;  Lysis of adhesions    LAPAROSCOPIC LYSIS OF ADHESIONS N/A 03/22/2024    Procedure: LYSIS, ADHESIONS, LAPAROSCOPIC;  Surgeon: Preston Bender MD;  Location: [facility] OR;  Service: General;  Laterality: N/A;    RADIOFREQUENCY ABLATION Right 10/13/2021    Procedure: RADIOFREQUENCY ABLATION;  Surgeon: David Combs MD;  Location: [facility];  Service: Pain Management;  Laterality: Right;  Right SI RFTC       Social History  Ms.  reports that she has been smoking cigarettes. She started smoking about 39 years ago. She has a 39.5 pack-year smoking history. She has been exposed to tobacco smoke. She has never used smokeless tobacco. She reports that she does not currently use alcohol. She reports that she does not use drugs.    Family History  Ms.'s family history includes Arthritis in her mother; Cancer in her father; Ear disease in her father; Ovarian cancer in her maternal aunt.    Medications and Allergies     Medications  Outpatient Medications Marked as Taking for the 7/14/25 encounter (Office Visit) with Chad Baker MD   Medication Sig Dispense Refill    amLODIPine (NORVASC) 10 MG tablet Take 1 tablet (10 mg  total) by mouth once daily. 30 tablet 5    aspirin (ECOTRIN) 81 MG EC tablet Take 81 mg by mouth once daily.      DULoxetine (CYMBALTA) 60 MG capsule Take 1 capsule (60 mg total) by mouth once daily. 90 capsule 1    ezetimibe (ZETIA) 10 mg tablet Take 10 mg by mouth once daily.      fluticasone propionate (FLONASE) 50 mcg/actuation nasal spray 1 spray (50 mcg total) by Each Nostril route once daily. 15 g 2    gabapentin (NEURONTIN) 600 MG tablet Take 1 tablet (600 mg total) by mouth 3 (three) times daily. For NERVE PAIN 270 tablet 1    HYDROcodone-acetaminophen (NORCO) 7.5-325 mg per tablet Take 1 tablet by mouth every 6 (six) hours as needed for Pain. 15 tablet 0    hydrOXYzine pamoate (VISTARIL) 25 MG Cap Take 1 capsule (25 mg total) by mouth every 8 (eight) hours as needed (anxiety). 30 capsule 5    labetaloL (NORMODYNE) 200 MG tablet Take 1 tablet by mouth 2 (two) times daily. For HEART 180 tablet 0    losartan (COZAAR) 50 MG tablet Take 1 tablet (50 mg total) by mouth once daily. 30 tablet 5    multivitamin/iron/folic acid (CENTRUM COMPLETE ORAL) Take by mouth Daily.      mv-mn/FA/bl coh/isoflav/jujube (ESTROVEN MENOPAUSE ORAL) Take by mouth once daily.      nitroGLYCERIN (NITROSTAT) 0.4 MG SL tablet Place 1 tablet (0.4 mg total) under the tongue every 5 (five) minutes as needed for Chest pain. 25 tablet 1    pantoprazole (PROTONIX) 40 MG tablet TAKE ONE TABLET BY MOUTH TWICE DAILY for GERD 180 tablet 1    REPATHA SURECLICK 140 mg/mL PnIj Inject 140 mg into the skin every 14 (fourteen) days.      tiZANidine (ZANAFLEX) 4 MG tablet Take 1 tablet (4 mg total) by mouth every 6 (six) hours as needed (muscle strain). 90 tablet 1       Allergies  Review of patient's allergies indicates:   Allergen Reactions    Atorvastatin      LIPITOR    Dog dander      Dog hair & epithelia    House dust mite      D. Ptery, D. Farinae    Levsin [hyoscyamine sulfate]     Nuts [tree nut]      BRAZIL NUTS    Pravastatin     Sulfa  (sulfonamide antibiotics)     Weed pollen-short ragweed        Physical Examination     Vitals:    07/14/25 1247   BP: 103/70   Pulse: 68   Resp: 16   Temp: 97.1 °F (36.2 °C)     Physical Exam  Constitutional:       Appearance: Normal appearance.   HENT:      Head: Normocephalic and atraumatic.   Eyes:      Extraocular Movements: Extraocular movements intact.   Cardiovascular:      Rate and Rhythm: Normal rate and regular rhythm.   Pulmonary:      Effort: Pulmonary effort is normal.      Breath sounds: Normal breath sounds.   Skin:     General: Skin is warm.   Neurological:      Mental Status: She is alert and oriented to person, place, and time. Mental status is at baseline.            Assessment and Plan (including Health Maintenance)      Problem List  Smart Sets  Document Outside HM   :    Plan:     Refer to clinical      Health Maintenance Due   Topic Date Due    Hepatitis C Screening  Never done    HIV Screening  Never done    Pneumococcal Vaccines (Age 50+) (1 of 2 - PCV) Never done    High Dose Statin  Never done    Shingles Vaccine (1 of 2) Never done    COVID-19 Vaccine (1 - 2024-25 season) Never done    LDCT Lung Screen  06/24/2025       Problem List Items Addressed This Visit          Pulmonary    Mucopurulent chronic bronchitis (Chronic)       Cardiac/Vascular    Essential hypertension (Chronic)    Mixed hyperlipidemia (Chronic)    Coronary artery disease involving native coronary artery of native heart without angina pectoris (Chronic)     Other Visit Diagnoses         Hypermobility of joint    -  Primary    Relevant Orders    Ambulatory referral/consult to Genetics            Health Maintenance Topics with due status: Not Due       Topic Last Completion Date    Colorectal Cancer Screening 04/25/2022    Hemoglobin A1c (Diabetic Prevention Screening) 05/23/2023    Lipid Panel 10/04/2023    TETANUS VACCINE 11/03/2024    Mammogram 11/18/2024    Influenza Vaccine 11/26/2024     Aspirin/Antiplatelet Therapy 07/14/2025    RSV Vaccine (Age 60+ and Pregnant patients) Not Due       Procedures     No future appointments.       Follow up if symptoms worsen or fail to improve.     Signature:  Chad Baker MD  [address] [place] [address]  Phone #: [phone]  Fax #: [phone]    Date of encounter: 7/14/25    There are no Patient Instructions on file for this visit.

## (undated) DEVICE — STRIP MEDI WND CLSR 1/2X4IN

## (undated) DEVICE — GLOVE SENSICARE PI MICRO 6

## (undated) DEVICE — CORD LAP 10 DISP

## (undated) DEVICE — GLOVE SENSICARE PI GRN 7

## (undated) DEVICE — ELECTRODE LAPSCP L HOOK 36CM

## (undated) DEVICE — DRAPE SURGICAL 3/4 SHEET 52IN X 76IN STERILE

## (undated) DEVICE — GLOVE SURGICAL PROTEXIS PI SIZE 6.5

## (undated) DEVICE — APPLICATOR CHLORAPREP HI-LITE TINTED ORANGE 26ML

## (undated) DEVICE — GLOVE SENSICARE PI SURG 6.5

## (undated) DEVICE — KIT IV START 849

## (undated) DEVICE — WARMER BLUE HEAT SCOPE 3-12MM

## (undated) DEVICE — TROCAR KII FIOS ZTHREAD 11X100

## (undated) DEVICE — CART INSERT SCISSOR F 5X34CM

## (undated) DEVICE — SUT MONOCYRL 4-0 PS2 UND

## (undated) DEVICE — CANNULA LAP SEAL Z THRD 5X100

## (undated) DEVICE — TRAY NERVE BLOCK (KC) PMA

## (undated) DEVICE — CATH IV JELCO 20GX1 1/4IN

## (undated) DEVICE — CANNULA STRYKER VENOM 20G, 150MM, 10MM (LONG)

## (undated) DEVICE — SET IV SOL CONTIN-FLOW 10 DROP/ML (PRIMARY)

## (undated) DEVICE — GLOVE SENSICARE PI GRN 6.5

## (undated) DEVICE — GLOVE SURGICAL PROTEXIS PI SIZE 7.5

## (undated) DEVICE — GLOVE SENSICARE PI SURG 7

## (undated) DEVICE — GOWN POLY REINF BRTH SLV XL

## (undated) DEVICE — BAG TISS RETRV MONARCH 10MM

## (undated) DEVICE — ADHESIVE MASTISOL VIAL 48/BX

## (undated) DEVICE — TROCAR ENDO Z THREAD KII 5X100

## (undated) DEVICE — GOWN NONREINF SET-IN SLV 2XL

## (undated) DEVICE — SOL NACL IRR 1000ML BTL

## (undated) DEVICE — APPLIER CLIP ENDO MED/LG 10MM

## (undated) DEVICE — Device